# Patient Record
Sex: FEMALE | Race: WHITE | Employment: OTHER | URBAN - METROPOLITAN AREA
[De-identification: names, ages, dates, MRNs, and addresses within clinical notes are randomized per-mention and may not be internally consistent; named-entity substitution may affect disease eponyms.]

---

## 2019-04-04 ENCOUNTER — OFFICE VISIT (OUTPATIENT)
Dept: NEUROLOGY | Age: 69
End: 2019-04-04

## 2019-04-04 VITALS
DIASTOLIC BLOOD PRESSURE: 98 MMHG | HEIGHT: 63 IN | HEART RATE: 96 BPM | BODY MASS INDEX: 20.91 KG/M2 | OXYGEN SATURATION: 98 % | WEIGHT: 118 LBS | SYSTOLIC BLOOD PRESSURE: 145 MMHG

## 2019-04-04 DIAGNOSIS — I61.9 RIGHT-SIDED NONTRAUMATIC INTRACEREBRAL HEMORRHAGE, UNSPECIFIED CEREBRAL LOCATION (HCC): Primary | ICD-10-CM

## 2019-04-04 DIAGNOSIS — I10 ESSENTIAL HYPERTENSION: ICD-10-CM

## 2019-04-04 RX ORDER — FOLIC ACID 1 MG/1
1 TABLET ORAL DAILY
COMMUNITY

## 2019-04-04 RX ORDER — SERTRALINE HYDROCHLORIDE 50 MG/1
100 TABLET, FILM COATED ORAL DAILY
COMMUNITY

## 2019-04-04 RX ORDER — SIMVASTATIN 10 MG/1
10 TABLET, FILM COATED ORAL
COMMUNITY

## 2019-04-04 RX ORDER — METHOTREXATE 2.5 MG/1
2.5 TABLET ORAL
COMMUNITY

## 2019-04-04 RX ORDER — LISINOPRIL 20 MG/1
TABLET ORAL DAILY
COMMUNITY

## 2019-04-04 RX ORDER — TRAZODONE HYDROCHLORIDE 50 MG/1
50 TABLET ORAL
COMMUNITY

## 2019-04-04 RX ORDER — TRAMADOL HYDROCHLORIDE 50 MG/1
50 TABLET ORAL
COMMUNITY
End: 2019-06-06

## 2019-04-04 NOTE — LETTER
4/4/19 Patient: Elisa Robertson YOB: 1950 Date of Visit: 4/4/2019 Stanislav Bray MD 
[address] [facility] 
[address] [address] VIA Facsimile: [phone] Dear Stanislav Bray MD, Thank you for referring  Malini Hill to [address] [place] [address] for evaluation. My notes for this consultation are attached. If you have questions, please do not hesitate to call me. I look forward to following your patient along with you. Sincerely, Lory Dey MD

## 2019-04-04 NOTE — PROGRESS NOTES
Neurology Note    Chief Complaint   Patient presents with    New Patient     stroke symptoms left hand, shoulder and foot numbeness       HPI/Subjective  Raquel William is a 78 y.o. adult who presented to the neurology office for management of stroke. Patient states that she had intracranial hemorrhage on 16 January 2019. She was vacationing in [place] and stood up and started walking and all of a sudden she slipped and fell down. She immediately noticed that her left side of the body was numb and weak. There was also dysarthria. She was taken to the hospital where CT scan of the head was done which did show intracranial hemorrhage. Unfortunately I do not have the report from there. Patient did undergo inpatient rehab and is now doing physical therapy. Patient states that the weakness has gotten better and the numbness has gotten better as well. She still does not have the full use of her left arm. She limps a bit while walking. When she is tired she gets mild dysarthria. Current Outpatient Medications   Medication Sig    folic acid (FOLVITE) 1 mg tablet Take  by mouth daily.  lisinopril (PRINIVIL, ZESTRIL) 20 mg tablet Take  by mouth daily.  methotrexate (RHEUMATREX) 2.5 mg tablet Take 2.5 mg by mouth. Taking 6 tabs once every Tuesday    sertraline (ZOLOFT) 50 mg tablet Take 50 mg by mouth daily. Patient taking 3 tabs for depression    simvastatin (ZOCOR) 10 mg tablet Take  by mouth nightly.  traMADol (ULTRAM) 50 mg tablet Take 50 mg by mouth every six (6) hours as needed for Pain.  traZODone (DESYREL) 50 mg tablet Take  by mouth nightly. No current facility-administered medications for this visit.       Allergies no known allergies  Past Medical History:   Diagnosis Date    Arthritis     Cerebral artery occlusion with cerebral infarction [facility])     Essential hypertension      Past Surgical History:   Procedure Laterality Date    HX GYN      HX ORTHOPAEDIC       No family history on file. Social History     Tobacco Use    Smoking status: Not on file   Substance Use Topics    Alcohol use: Yes     Alcohol/week: 0.6 oz     Types: 1 Cans of beer per week    Drug use: Not on file       REVIEW OF SYSTEMS:   A ten system review of constitutional, cardiovascular, respiratory, musculoskeletal, endocrine, skin, SHEENT, genitourinary, psychiatric and neurologic systems was obtained and is unremarkable with the exception of anxiety, hearing loss and muscle weakness    EXAMINATION:   Visit Vitals  BP (!) 145/98   Pulse 96   Ht 5' 3\" (1.6 m)   Wt 118 lb (53.5 kg)   SpO2 98%   BMI 20.90 kg/m²        General:   General appearance: Pt is in no acute distress   Distal pulses are preserved  Fundoscopic Exam: Attempted    Neurological Examination:   Mental Status: AAO x3. Speech is fluent. Follows commands, has normal fund of knowledge, attention, short term recall, comprehension and insight. Cranial Nerves: Visual fields are full. PERRL, Extraocular movements are full. Facial sensation intact. Facial movement intact. Hearing intact to conversation. Palate elevates symmetrically. Shoulder shrug symmetric. Tongue midline. Motor: Strength is 5/5 except left upper extremity is 4 out of 5    Tone: Mild spasticity in left upper and lower extremity    Sensation: Decreased left upper and lower extremity numbness    Reflexes: DTRs 2+ throughout. Coordination/Cerebellar: Intact to finger-nose-finger     Gait: While walking, patient is not flexing her left leg as much in comparison to the right. Skin: No significant bruising or lacerations. Laboratory review:   No results found for this or any previous visit. Imaging review:  None    Documentation review:  None    Assessment/Plan:   Meghan Eisenberg is a 78 y.o. adult who presented to the neurology office for management of intracranial hemorrhage.   Patient had intracranial hemorrhage in January 2019 which left her with left-sided weakness, numbness and dysarthria. Symptoms are significantly better. Patient is continue with physical therapy and Occupational Therapy. Patient did also have hypertension and blood pressure today is 145/98. Patient's lisinopril was increased to 20 mg daily and she took her first dose today. We will continue to observe how her blood pressures. Blood pressure goal is less than 140/100. I am going to get records from her admission for stroke from [place]. Follow-up in 2 months. No flowsheet data found. Primary care to address possible depression if PHQ-9 score is more than 9. ICD-10-CM ICD-9-CM    1. Right-sided nontraumatic intracerebral hemorrhage, unspecified cerebral location (HCC) I61.9 431    2. Essential hypertension I10 401.9       Thank you for allowing me to participate in the care of  Tash. Please feel free to contact me if you have any questions. Jannie Monreal MD  Neurologist    CC: Faith Gill MD  Fax: [phone]    This note was created using voice recognition software. Despite editing, there may be syntax errors.

## 2019-06-06 ENCOUNTER — OFFICE VISIT (OUTPATIENT)
Dept: CARDIOLOGY CLINIC | Age: 69
End: 2019-06-06

## 2019-06-06 VITALS
HEART RATE: 91 BPM | HEIGHT: 63 IN | OXYGEN SATURATION: 96 % | SYSTOLIC BLOOD PRESSURE: 128 MMHG | BODY MASS INDEX: 21 KG/M2 | RESPIRATION RATE: 16 BRPM | WEIGHT: 118.5 LBS | DIASTOLIC BLOOD PRESSURE: 80 MMHG

## 2019-06-06 DIAGNOSIS — M06.9 RHEUMATOID ARTHRITIS, INVOLVING UNSPECIFIED SITE, UNSPECIFIED RHEUMATOID FACTOR PRESENCE: ICD-10-CM

## 2019-06-06 DIAGNOSIS — I10 ESSENTIAL HYPERTENSION: Primary | ICD-10-CM

## 2019-06-06 DIAGNOSIS — E78.2 MIXED HYPERLIPIDEMIA: ICD-10-CM

## 2019-06-06 DIAGNOSIS — Z86.73 HISTORY OF CVA (CEREBROVASCULAR ACCIDENT): ICD-10-CM

## 2019-06-06 RX ORDER — INFLIXIMAB 100 MG/10ML
INJECTION, POWDER, LYOPHILIZED, FOR SOLUTION INTRAVENOUS
COMMUNITY

## 2019-06-06 RX ORDER — AMLODIPINE BESYLATE 2.5 MG/1
TABLET ORAL
COMMUNITY
Start: 2019-05-22 | End: 2020-07-24

## 2019-06-06 RX ORDER — CLOBETASOL PROPIONATE 0.5 MG/G
CREAM TOPICAL
COMMUNITY
End: 2019-07-11

## 2019-06-06 NOTE — PROGRESS NOTES
1. Have you been to the ER, urgent care clinic since your last visit? Hospitalized since your last visit? Yes When: on 1/2019 for stroke (hospital in [place])    2. Have you seen or consulted any other health care providers outside of the [address] [place] [address] since your last visit? Include any pap smears or colon screening.  No    Chief Complaint   Patient presents with   [facility] [place]     new pt; referred by rheumatolgist Dr. Vishal Marley Stroke     hx of stroke on 1/2019

## 2019-06-06 NOTE — PROGRESS NOTES
[address] [place] [address]  [phone]     Subjective:      Maxime Bowman is a 78 y.o. female with pmhx HTN HLD CVA in 1/19 while in [place] is here to establish care. Reports numbness on left arm and left leg and some mild facial numbness left side. Has some mild monge. The patient denies chest pain,  orthopnea, PND, LE edema, palpitations, syncope, or presyncope. There are no active problems to display for this patient. Faith Gill MD  Past Medical History:   Diagnosis Date    Arthritis     Cerebral artery occlusion with cerebral infarction [facility])     Essential hypertension       Past Surgical History:   Procedure Laterality Date    HX GYN      HX ORTHOPAEDIC       No Known Allergies   History reviewed. No pertinent family history. Social History     Socioeconomic History    Marital status:      Spouse name: Not on file    Number of children: Not on file    Years of education: Not on file    Highest education level: Not on file   Occupational History    Not on file   Social Needs    Financial resource strain: Not on file    Food insecurity:     Worry: Not on file     Inability: Not on file    Transportation needs:     Medical: Not on file     Non-medical: Not on file   Tobacco Use    Smoking status: Former Smoker    Smokeless tobacco: Former User   Substance and Sexual Activity    Alcohol use:  Yes     Alcohol/week: 0.6 oz     Types: 1 Cans of beer per week     Comment: occasionally    Drug use: Not on file    Sexual activity: Not on file   Lifestyle    Physical activity:     Days per week: Not on file     Minutes per session: Not on file    Stress: Not on file   Relationships    Social connections:     Talks on phone: Not on file     Gets together: Not on file     Attends Baptism service: Not on file     Active member of club or organization: Not on file     Attends meetings of clubs or organizations: Not on file Relationship status: Not on file    Intimate partner violence:     Fear of current or ex partner: Not on file     Emotionally abused: Not on file     Physically abused: Not on file     Forced sexual activity: Not on file   Other Topics Concern    Not on file   Social History Narrative    Not on file      Current Outpatient Medications   Medication Sig    inFLIXimab (REMICADE) 100 mg injection by IntraVENous route every two (2) months.  amLODIPine (NORVASC) 2.5 mg tablet TAKE 1 TABLET BY MOUTH ONCE A DAY FOR BLOOD PRESSURE    clobetasol (TEMOVATE) 0.05 % topical cream clobetasol 0.05 % topical cream    folic acid (FOLVITE) 1 mg tablet Take  by mouth daily.  lisinopril (PRINIVIL, ZESTRIL) 20 mg tablet Take  by mouth daily.  methotrexate (RHEUMATREX) 2.5 mg tablet Take 2.5 mg by mouth. Taking 6 tabs once every Tuesday    sertraline (ZOLOFT) 50 mg tablet Take 100 mg by mouth daily. Patient taking 3 tabs for depression     simvastatin (ZOCOR) 10 mg tablet Take  by mouth nightly.  traZODone (DESYREL) 50 mg tablet Take  by mouth nightly. No current facility-administered medications for this visit. Review of Symptoms:  11 systems reviewed, negative other than as stated in the HPI    Physical ExamPhysical Exam:    Vitals:    06/06/19 1327   BP: 128/80   Pulse: 91   Resp: 16   SpO2: 96%   Weight: 118 lb 8 oz (53.8 kg)   Height: 5' 3\" (1.6 m)     Body mass index is 20.99 kg/m². General PE   Gen:  NAD  Mental Status - Alert. General Appearance - Not in acute distress. Chest and Lung Exam   Inspection: Accessory muscles - No use of accessory muscles in breathing. Auscultation:   Breath sounds: - Normal.   Cardiovascular   Inspection: Jugular vein - Bilateral - Inspection Normal.   Palpation/Percussion:   Apical Impulse: - Normal.   Auscultation: Rhythm - Regular. Heart Sounds - S1 WNL and S2 WNL. No S3 or S4. Murmurs & Other Heart Sounds: Auscultation of the heart reveals - No Murmurs. Peripheral Vascular   Upper Extremity: Inspection - Bilateral - No Cyanotic nailbeds or Digital clubbing. Lower Extremity:   Palpation: Edema - Bilateral - No edema. Abdomen:   Soft, non-tender, bowel sounds are active. Neuro: A&O times 3, left sided weakness. Right hand is weak d/t hairline fx on her wrist    Labs:   No results found for: CHOL, CHOLX, CHLST, CHOLV, [id number], HDL, LDL, LDLC, DLDLP, TGLX, TRIGL, TRIGP, CHHD, CHHDX  No results found for: CPK, CPKX, CPX  No results found for: NA, K, CL, CO2, AGAP, GLU, BUN, CREA, BUCR, GFRAA, GFRNA, CA, TBIL, TBILI, GPT, SGOT, AP, TP, ALB, GLOB, AGRAT, ALT    EKG:  SR     Assessment:     Assessment:      1. Essential hypertension    2. Mixed hyperlipidemia    3. History of CVA (cerebrovascular accident)    4. Rheumatoid arthritis, involving unspecified site, unspecified rheumatoid factor presence ([facility] [address]        Orders Placed This Encounter    AMB POC EKG ROUTINE W/ 12 LEADS, INTER & REP     Order Specific Question:   Reason for Exam:     Answer:   Routine    inFLIXimab (REMICADE) 100 mg injection     Sig: by IntraVENous route every two (2) months.  amLODIPine (NORVASC) 2.5 mg tablet     Sig: TAKE 1 TABLET BY MOUTH ONCE A DAY FOR BLOOD PRESSURE    clobetasol (TEMOVATE) 0.05 % topical cream     Sig: clobetasol 0.05 % topical cream        Plan: This  is a 78 y.o. female with pmhx HTN HLD CVA in 1/19 while in [place] is here to establish care. Reports numbness on left arm and left leg and some mild facial numbness left side. Has some mild monge    Cardiac risk factors: HTN HLD postmenopausal female age former smoker mother hx of CVA, unknown if CAD    HTN  Controlled with current therapy    HLD  10/18 . Recently started on statin. Lipids and labs followed by PCP.       Recent CVA intracranial hemorrhage  1/19 with residual left sided weakness  Per pt,  She was vacationing in [place] and stood up and started walking and all of a sudden she slipped and fell down.  She immediately noticed that her left side of the body was numb and weak  Followed by DR Reji Davila  Working with PT    RA  On Mtx, remicade  Followed by Dr Gomez Pacer    Will obtain  echo and Mary Cotto to further evaluate. Continue current care and f/u when testing complete    Sindy Spain NP       [facility]    6/6/2019         Patient seen, examined by me personally. Plan discussed as detailed. Agree with note as outlined by  NP. I confirm findings in history and physical exam. No additional findings noted. Agree with plan as outlined above. Patient  is still recovering from her CVA. Has left arm/facial pain numbness. Will evaluate as noted.      Rebekah Paredes MD

## 2019-06-19 ENCOUNTER — HOSPITAL ENCOUNTER (EMERGENCY)
Age: 69
Discharge: HOME | End: 2019-06-19
Attending: EMERGENCY MEDICINE
Payer: MEDICARE

## 2019-06-19 VITALS
RESPIRATION RATE: 17 BRPM | HEART RATE: 85 BPM | WEIGHT: 121.25 LBS | TEMPERATURE: 98.1 F | OXYGEN SATURATION: 94 % | DIASTOLIC BLOOD PRESSURE: 81 MMHG | SYSTOLIC BLOOD PRESSURE: 156 MMHG | BODY MASS INDEX: 21.48 KG/M2 | HEIGHT: 63 IN

## 2019-06-19 DIAGNOSIS — F41.1 ANXIETY STATE: ICD-10-CM

## 2019-06-19 DIAGNOSIS — I10 ACCELERATED HYPERTENSION: Primary | ICD-10-CM

## 2019-06-19 LAB
ALBUMIN SERPL-MCNC: 4.5 G/DL (ref 3.5–5)
ALBUMIN/GLOB SERPL: 1.2 {RATIO} (ref 1.1–2.2)
ALP SERPL-CCNC: 111 U/L (ref 45–117)
ALT SERPL-CCNC: 28 U/L (ref 12–78)
ANION GAP SERPL CALC-SCNC: 5 MMOL/L (ref 5–15)
AST SERPL-CCNC: 18 U/L (ref 15–37)
BASOPHILS # BLD: 0.1 K/UL (ref 0–0.1)
BASOPHILS NFR BLD: 1 % (ref 0–1)
BILIRUB SERPL-MCNC: 0.4 MG/DL (ref 0.2–1)
BUN SERPL-MCNC: 14 MG/DL (ref 6–20)
BUN/CREAT SERPL: 15 (ref 12–20)
CALCIUM SERPL-MCNC: 9.3 MG/DL (ref 8.5–10.1)
CHLORIDE SERPL-SCNC: 106 MMOL/L (ref 97–108)
CO2 SERPL-SCNC: 29 MMOL/L (ref 21–32)
CREAT SERPL-MCNC: 0.93 MG/DL (ref 0.55–1.02)
DIFFERENTIAL METHOD BLD: ABNORMAL
EOSINOPHIL # BLD: 0.2 K/UL (ref 0–0.4)
EOSINOPHIL NFR BLD: 3 % (ref 0–7)
ERYTHROCYTE [DISTWIDTH] IN BLOOD BY AUTOMATED COUNT: 13.2 % (ref 11.5–14.5)
GLOBULIN SER CALC-MCNC: 3.9 G/DL (ref 2–4)
GLUCOSE SERPL-MCNC: 89 MG/DL (ref 65–100)
HCT VFR BLD AUTO: 47.2 % (ref 35–47)
HGB BLD-MCNC: 15.5 G/DL (ref 11.5–16)
IMM GRANULOCYTES # BLD AUTO: 0 K/UL (ref 0–0.04)
IMM GRANULOCYTES NFR BLD AUTO: 0 % (ref 0–0.5)
LYMPHOCYTES # BLD: 2.3 K/UL (ref 0.8–3.5)
LYMPHOCYTES NFR BLD: 35 % (ref 12–49)
MCH RBC QN AUTO: 31.4 PG (ref 26–34)
MCHC RBC AUTO-ENTMCNC: 32.8 G/DL (ref 30–36.5)
MCV RBC AUTO: 95.5 FL (ref 80–99)
MONOCYTES # BLD: 0.5 K/UL (ref 0–1)
MONOCYTES NFR BLD: 8 % (ref 5–13)
NEUTS SEG # BLD: 3.4 K/UL (ref 1.8–8)
NEUTS SEG NFR BLD: 53 % (ref 32–75)
NRBC # BLD: 0 K/UL (ref 0–0.01)
NRBC BLD-RTO: 0 PER 100 WBC
PLATELET # BLD AUTO: 282 K/UL (ref 150–400)
PMV BLD AUTO: 8.9 FL (ref 8.9–12.9)
POTASSIUM SERPL-SCNC: 3.9 MMOL/L (ref 3.5–5.1)
PROT SERPL-MCNC: 8.4 G/DL (ref 6.4–8.2)
RBC # BLD AUTO: 4.94 M/UL (ref 3.8–5.2)
SODIUM SERPL-SCNC: 140 MMOL/L (ref 136–145)
WBC # BLD AUTO: 6.4 K/UL (ref 3.6–11)

## 2019-06-19 PROCEDURE — 80053 COMPREHEN METABOLIC PANEL: CPT

## 2019-06-19 PROCEDURE — 99284 EMERGENCY DEPT VISIT MOD MDM: CPT

## 2019-06-19 PROCEDURE — 85025 COMPLETE CBC W/AUTO DIFF WBC: CPT

## 2019-06-19 PROCEDURE — 36415 COLL VENOUS BLD VENIPUNCTURE: CPT

## 2019-06-19 RX ORDER — HYDROXYZINE 25 MG/1
25 TABLET, FILM COATED ORAL
Qty: 20 TAB | Refills: 0 | Status: SHIPPED | OUTPATIENT
Start: 2019-06-19 | End: 2019-06-29

## 2019-06-19 RX ORDER — LABETALOL HYDROCHLORIDE 5 MG/ML
5 INJECTION, SOLUTION INTRAVENOUS
Status: DISCONTINUED | OUTPATIENT
Start: 2019-06-19 | End: 2019-06-19

## 2019-06-19 RX ORDER — LABETALOL HYDROCHLORIDE 5 MG/ML
10 INJECTION, SOLUTION INTRAVENOUS
Status: DISCONTINUED | OUTPATIENT
Start: 2019-06-19 | End: 2019-06-19

## 2019-06-20 NOTE — ED NOTES
Tay Rivera MD at bedside at this time to evaluate pt.     Verbal orders from Tay Rivera to hold labetalol

## 2019-06-20 NOTE — ED PROVIDER NOTES
EMERGENCY DEPARTMENT HISTORY AND PHYSICAL EXAM      Date: 6/19/2019  Patient Name: Hannah Riggs    History of Presenting Illness     Chief Complaint   Patient presents with    Hypertension       History Provided By: Patient    HPI: Hannah Riggs, 78 y.o. female with PMHx significant for CVA with residual and hypertension, presents    to the ED with cc of elevated blood pressure. The patient states that she was being seen by her physical therapist today when her blood pressure was elevated. She cannot remember the numbers, but states the systolic was in the 111R. The patient states that she has been taking her blood pressure medicine appropriately. She cannot remember the names of all her. She denies headache, chest pain, dizziness, new numbness or tingling. There are no other complaints, changes, or physical findings at this time. PCP: Naz Ogden MD    No current facility-administered medications on file prior to encounter. Current Outpatient Medications on File Prior to Encounter   Medication Sig Dispense Refill    inFLIXimab (REMICADE) 100 mg injection by IntraVENous route every two (2) months.  amLODIPine (NORVASC) 2.5 mg tablet TAKE 1 TABLET BY MOUTH ONCE A DAY FOR BLOOD PRESSURE      clobetasol (TEMOVATE) 0.05 % topical cream clobetasol 0.05 % topical cream      folic acid (FOLVITE) 1 mg tablet Take  by mouth daily.  lisinopril (PRINIVIL, ZESTRIL) 20 mg tablet Take  by mouth daily.  methotrexate (RHEUMATREX) 2.5 mg tablet Take 2.5 mg by mouth. Taking 6 tabs once every Tuesday      sertraline (ZOLOFT) 50 mg tablet Take 100 mg by mouth daily. Patient taking 3 tabs for depression       simvastatin (ZOCOR) 10 mg tablet Take  by mouth nightly.  traZODone (DESYREL) 50 mg tablet Take  by mouth nightly.          Past History     Past Medical History:  Past Medical History:   Diagnosis Date    Arthritis     Cerebral artery occlusion with cerebral infarction ([facility] [address]     Essential hypertension        Past Surgical History:  Past Surgical History:   Procedure Laterality Date    HX GYN      HX ORTHOPAEDIC         Family History:  History reviewed. No pertinent family history. Social History:  Social History     Tobacco Use    Smoking status: Former Smoker    Smokeless tobacco: Former User   Substance Use Topics    Alcohol use: Yes     Alcohol/week: 0.6 oz     Types: 1 Cans of beer per week     Comment: occasionally    Drug use: Not on file       Allergies:  No Known Allergies      Review of Systems   Review of Systems   HENT: Negative for congestion. Eyes: Negative. Cardiovascular: Negative for chest pain. Gastrointestinal: Negative for abdominal pain. Endocrine: Negative for heat intolerance. Genitourinary: Negative. Musculoskeletal: Negative for back pain. Skin: Negative for rash. Allergic/Immunologic: Negative for immunocompromised state. Neurological: Positive for numbness. Negative for dizziness. The patient has baseline numbness on the right side. Hematological: Does not bruise/bleed easily. Psychiatric/Behavioral: Negative. All other systems reviewed and are negative. Physical Exam   Physical Exam   Constitutional: She is oriented to person, place, and time. She appears well-developed and well-nourished. No distress. HENT:   Head: Normocephalic. Eyes: Pupils are equal, round, and reactive to light. EOM are normal.   Neck: Normal range of motion. Neck supple. Cardiovascular: Normal rate, regular rhythm, normal heart sounds and intact distal pulses. Pulmonary/Chest: Effort normal and breath sounds normal. No respiratory distress. Abdominal: Soft. Bowel sounds are normal. There is no tenderness. Musculoskeletal: Normal range of motion. She exhibits no edema. Neurological: She is alert and oriented to person, place, and time.    Decreased light touch sensation right arm right leg, baseline per patient   Skin: Skin is warm and dry. Psychiatric: She has a normal mood and affect. Her behavior is normal.   Nursing note and vitals reviewed. Diagnostic Study Results     Labs -     Recent Results (from the past 12 hour(s))   CBC WITH AUTOMATED DIFF    Collection Time: 06/19/19  5:56 PM   Result Value Ref Range    WBC 6.4 3.6 - 11.0 K/uL    RBC 4.94 3.80 - 5.20 M/uL    HGB 15.5 11.5 - 16.0 g/dL    HCT 47.2 (H) 35.0 - 47.0 %    MCV 95.5 80.0 - 99.0 FL    MCH 31.4 26.0 - 34.0 PG    MCHC 32.8 30.0 - 36.5 g/dL    RDW 13.2 11.5 - 14.5 %    PLATELET 268 465 - 329 K/uL    MPV 8.9 8.9 - 12.9 FL    NRBC 0.0 0  WBC    ABSOLUTE NRBC 0.00 0.00 - 0.01 K/uL    NEUTROPHILS 53 32 - 75 %    LYMPHOCYTES 35 12 - 49 %    MONOCYTES 8 5 - 13 %    EOSINOPHILS 3 0 - 7 %    BASOPHILS 1 0 - 1 %    IMMATURE GRANULOCYTES 0 0.0 - 0.5 %    ABS. NEUTROPHILS 3.4 1.8 - 8.0 K/UL    ABS. LYMPHOCYTES 2.3 0.8 - 3.5 K/UL    ABS. MONOCYTES 0.5 0.0 - 1.0 K/UL    ABS. EOSINOPHILS 0.2 0.0 - 0.4 K/UL    ABS. BASOPHILS 0.1 0.0 - 0.1 K/UL    ABS. IMM. GRANS. 0.0 0.00 - 0.04 K/UL    DF AUTOMATED     METABOLIC PANEL, COMPREHENSIVE    Collection Time: 06/19/19  5:56 PM   Result Value Ref Range    Sodium 140 136 - 145 mmol/L    Potassium 3.9 3.5 - 5.1 mmol/L    Chloride 106 97 - 108 mmol/L    CO2 29 21 - 32 mmol/L    Anion gap 5 5 - 15 mmol/L    Glucose 89 65 - 100 mg/dL    BUN 14 6 - 20 MG/DL    Creatinine 0.93 0.55 - 1.02 MG/DL    BUN/Creatinine ratio 15 12 - 20      GFR est AA >60 >60 ml/min/1.73m2    GFR est non-AA 60 (L) >60 ml/min/1.73m2    Calcium 9.3 8.5 - 10.1 MG/DL    Bilirubin, total 0.4 0.2 - 1.0 MG/DL    ALT (SGPT) 28 12 - 78 U/L    AST (SGOT) 18 15 - 37 U/L    Alk.  phosphatase 111 45 - 117 U/L    Protein, total 8.4 (H) 6.4 - 8.2 g/dL    Albumin 4.5 3.5 - 5.0 g/dL    Globulin 3.9 2.0 - 4.0 g/dL    A-G Ratio 1.2 1.1 - 2.2         Radiologic Studies -   No orders to display     CT Results  (Last 48 hours)    None        CXR Results  (Last 48 hours)    None Medical Decision Making   I am the first provider for this patient. I reviewed the vital signs, available nursing notes, past medical history, past surgical history, family history and social history. Vital Signs-Reviewed the patient's vital signs. Patient Vitals for the past 12 hrs:   Temp Pulse Resp BP SpO2   06/19/19 2219    166/84 96 %   06/19/19 2154  79 18 (!) 200/99 92 %   06/19/19 2145    (!) 216/118 96 %   06/19/19 2100  69 18 (!) 179/106 95 %   06/19/19 2045    (!) 173/133 95 %   06/19/19 2038  71 19 (!) 180/117 95 %   06/19/19 2001    (!) 190/101    06/19/19 1936 98.2 °F (36.8 °C) 74 18 (!) 222/155 96 %   06/19/19 1614 98 °F (36.7 °C) 79 16 (!) 214/103 98 %       Pulse Oximetry Analysis - 98% on room air    Cardiac Monitor:   Rate: 79 bpm  Rhythm: Normal Sinus Rhythm          Records Reviewed: Nursing Notes, Old Medical Records, Previous electrocardiograms, Previous Radiology Studies and Previous Laboratory Studies    Provider Notes (Medical Decision Making): Hypertension, anxiety, renal insufficiency, electrolyte abnormality    ED Course:   Initial assessment performed. The patients presenting problems have been discussed, and they are in agreement with the care plan formulated and outlined with them. I have encouraged them to ask questions as they arise throughout their visit. Progress note: The patient's nurse had the patiently very still. When she rechecked her blood pressure, it was suddenly 166/84. This was without any medications. The patient admits that she is very anxious and stressed at this time. So I will give her a prescription for Atarax, and have her follow-up with her PCP next week for blood pressure recheck. Critical Care Time:   0    Disposition:  home    PLAN:  1.    Discharge Medication List as of 6/19/2019 10:37 PM      START taking these medications    Details   hydrOXYzine HCl (ATARAX) 25 mg tablet Take 1 Tab by mouth every six (6) hours as needed for Itching for up to 10 days. , Normal, Disp-20 Tab, R-0         CONTINUE these medications which have NOT CHANGED    Details   inFLIXimab (REMICADE) 100 mg injection by IntraVENous route every two (2) months., Historical Med      amLODIPine (NORVASC) 2.5 mg tablet TAKE 1 TABLET BY MOUTH ONCE A DAY FOR BLOOD PRESSURE, Historical Med      clobetasol (TEMOVATE) 0.05 % topical cream clobetasol 0.05 % topical cream, Historical Med      folic acid (FOLVITE) 1 mg tablet Take  by mouth daily. , Historical Med      lisinopril (PRINIVIL, ZESTRIL) 20 mg tablet Take  by mouth daily. , Historical Med      methotrexate (RHEUMATREX) 2.5 mg tablet Take 2.5 mg by mouth. Taking 6 tabs once every Tuesday, Historical Med      sertraline (ZOLOFT) 50 mg tablet Take 100 mg by mouth daily. Patient taking 3 tabs for depression , Historical Med      simvastatin (ZOCOR) 10 mg tablet Take  by mouth nightly., Historical Med      traZODone (DESYREL) 50 mg tablet Take  by mouth nightly., Historical Med           2. Follow-up Information     Follow up With Specialties Details Why Contact Info    Chantelle Arceo MD Family Practice In 1 week For a blood pressure check [address]  [address] [address] [phone]      [facility] EMERGENCY DEPT Emergency Medicine  If symptoms worsen [address] [place] [address] Drive  [address] [facility]  [phone]        Return to ED if worse     Diagnosis     Clinical Impression:   1. Accelerated hypertension    2. Anxiety state        Attestations:     This chart was completed by myself, Dr. Cheyenne Griffin

## 2019-06-20 NOTE — DISCHARGE INSTRUCTIONS
Patient Education        Anxiety Disorder: Care Instructions  Your Care Instructions    Anxiety is a normal reaction to stress. Difficult situations can cause you to have symptoms such as sweaty palms and a nervous feeling. In an anxiety disorder, the symptoms are far more severe. Constant worry, muscle tension, trouble sleeping, nausea and diarrhea, and other symptoms can make normal daily activities difficult or impossible. These symptoms may occur for no reason, and they can affect your work, school, or social life. Medicines, counseling, and self-care can all help. Follow-up care is a key part of your treatment and safety. Be sure to make and go to all appointments, and call your doctor if you are having problems. It's also a good idea to know your test results and keep a list of the medicines you take. How can you care for yourself at home? · Take medicines exactly as directed. Call your doctor if you think you are having a problem with your medicine. · Go to your counseling sessions and follow-up appointments. · Recognize and accept your anxiety. Then, when you are in a situation that makes you anxious, say to yourself, \"This is not an emergency. I feel uncomfortable, but I am not in danger. I can keep going even if I feel anxious. \"  · Be kind to your body:  ? Relieve tension with exercise or a massage. ? Get enough rest.  ? Avoid alcohol, caffeine, nicotine, and illegal drugs. They can increase your anxiety level and cause sleep problems. ? Learn and do relaxation techniques. See below for more about these techniques. · Engage your mind. Get out and do something you enjoy. Go to a funny movie, or take a walk or hike. Plan your day. Having too much or too little to do can make you anxious. · Keep a record of your symptoms. Discuss your fears with a good friend or family member, or join a support group for people with similar problems. Talking to others sometimes relieves stress.   · Get involved in social groups, or volunteer to help others. Being alone sometimes makes things seem worse than they are. · Get at least 30 minutes of exercise on most days of the week to relieve stress. Walking is a good choice. You also may want to do other activities, such as running, swimming, cycling, or playing tennis or team sports. Relaxation techniques  Do relaxation exercises 10 to 20 minutes a day. You can play soothing, relaxing music while you do them, if you wish. · Tell others in your house that you are going to do your relaxation exercises. Ask them not to disturb you. · Find a comfortable place, away from all distractions and noise. · Lie down on your back, or sit with your back straight. · Focus on your breathing. Make it slow and steady. · Breathe in through your nose. Breathe out through either your nose or mouth. · Breathe deeply, filling up the area between your navel and your rib cage. Breathe so that your belly goes up and down. · Do not hold your breath. · Breathe like this for 5 to 10 minutes. Notice the feeling of calmness throughout your whole body. As you continue to breathe slowly and deeply, relax by doing the following for another 5 to 10 minutes:  · Tighten and relax each muscle group in your body. You can begin at your toes and work your way up to your head. · Imagine your muscle groups relaxing and becoming heavy. · Empty your mind of all thoughts. · Let yourself relax more and more deeply. · Become aware of the state of calmness that surrounds you. · When your relaxation time is over, you can bring yourself back to alertness by moving your fingers and toes and then your hands and feet and then stretching and moving your entire body. Sometimes people fall asleep during relaxation, but they usually wake up shortly afterward. · Always give yourself time to return to full alertness before you drive a car or do anything that might cause an accident if you are not fully alert.  Never play a relaxation tape while you drive a car. When should you call for help? Call 911 anytime you think you may need emergency care. For example, call if:    · You feel you cannot stop from hurting yourself or someone else.   Karlie Fought the numbers for these national suicide hotlines: 2-241-199-TALK (4-836.836.8240) and 8-519-PNLKMQC (1-754.542.6885). If you or someone you know talks about suicide or feeling hopeless, get help right away.   Watch closely for changes in your health, and be sure to contact your doctor if:    · You have anxiety or fear that affects your life.     · You have symptoms of anxiety that are new or different from those you had before. Where can you learn more? Go to http://cecelia-yolanda.info/. Enter P754 in the search box to learn more about \"Anxiety Disorder: Care Instructions. \"  Current as of: September 11, 2018  Content Version: 11.9  © 4851-4903 [employer]. Care instructions adapted under license by [employer] (which disclaims liability or warranty for this information). If you have questions about a medical condition or this instruction, always ask your healthcare professional. [place] [address] any warranty or liability for your use of this information. Patient Education        High Blood Pressure: Care Instructions  Overview    It's normal for blood pressure to go up and down throughout the day. But if it stays up, you have high blood pressure. Another name for high blood pressure is hypertension. Despite what a lot of people think, high blood pressure usually doesn't cause headaches or make you feel dizzy or lightheaded. It usually has no symptoms. But it does increase your risk of stroke, heart attack, and other problems. You and your doctor will talk about your risks of these problems based on your blood pressure. Your doctor will give you a goal for your blood pressure.  Your goal will be based on your health and your age. Lifestyle changes, such as eating healthy and being active, are always important to help lower blood pressure. You might also take medicine to reach your blood pressure goal.  Follow-up care is a key part of your treatment and safety. Be sure to make and go to all appointments, and call your doctor if you are having problems. It's also a good idea to know your test results and keep a list of the medicines you take. How can you care for yourself at home? Medical treatment  · If you stop taking your medicine, your blood pressure will go back up. You may take one or more types of medicine to lower your blood pressure. Be safe with medicines. Take your medicine exactly as prescribed. Call your doctor if you think you are having a problem with your medicine. · Talk to your doctor before you start taking aspirin every day. Aspirin can help certain people lower their risk of a heart attack or stroke. But taking aspirin isn't right for everyone, because it can cause serious bleeding. · See your doctor regularly. You may need to see the doctor more often at first or until your blood pressure comes down. · If you are taking blood pressure medicine, talk to your doctor before you take decongestants or anti-inflammatory medicine, such as ibuprofen. Some of these medicines can raise blood pressure. · Learn how to check your blood pressure at home. Lifestyle changes  · Stay at a healthy weight. This is especially important if you put on weight around the waist. Losing even 10 pounds can help you lower your blood pressure. · If your doctor recommends it, get more exercise. Walking is a good choice. Bit by bit, increase the amount you walk every day. Try for at least 30 minutes on most days of the week. You also may want to swim, bike, or do other activities. · Avoid or limit alcohol. Talk to your doctor about whether you can drink any alcohol.   · Try to limit how much sodium you eat to less than 2,300 milligrams (mg) a day. Your doctor may ask you to try to eat less than 1,500 mg a day. · Eat plenty of fruits (such as bananas and oranges), vegetables, legumes, whole grains, and low-fat dairy products. · Lower the amount of saturated fat in your diet. Saturated fat is found in animal products such as milk, cheese, and meat. Limiting these foods may help you lose weight and also lower your risk for heart disease. · Do not smoke. Smoking increases your risk for heart attack and stroke. If you need help quitting, talk to your doctor about stop-smoking programs and medicines. These can increase your chances of quitting for good. When should you call for help? Call 911 anytime you think you may need emergency care. This may mean having symptoms that suggest that your blood pressure is causing a serious heart or blood vessel problem. Your blood pressure may be over 180/120.   For example, call 911 if:    · You have symptoms of a heart attack. These may include:  ? Chest pain or pressure, or a strange feeling in the chest.  ? Sweating. ? Shortness of breath. ? Nausea or vomiting. ? Pain, pressure, or a strange feeling in the back, neck, jaw, or upper belly or in one or both shoulders or arms. ? Lightheadedness or sudden weakness. ? A fast or irregular heartbeat.     · You have symptoms of a stroke. These may include:  ? Sudden numbness, tingling, weakness, or loss of movement in your face, arm, or leg, especially on only one side of your body. ? Sudden vision changes. ? Sudden trouble speaking. ? Sudden confusion or trouble understanding simple statements. ? Sudden problems with walking or balance. ? A sudden, severe headache that is different from past headaches.     · You have severe back or belly pain.    Do not wait until your blood pressure comes down on its own.  Get help right away.   Call your doctor now or seek immediate care if:    · Your blood pressure is much higher than normal (such as 180/120 or higher), but you don't have symptoms.     · You think high blood pressure is causing symptoms, such as:  ? Severe headache.  ? Blurry vision.    Watch closely for changes in your health, and be sure to contact your doctor if:    · Your blood pressure measures higher than your doctor recommends at least 2 times. That means the top number is higher or the bottom number is higher, or both.     · You think you may be having side effects from your blood pressure medicine. Where can you learn more? Go to http://cecelia-yolanda.info/. Enter J151 in the search box to learn more about \"High Blood Pressure: Care Instructions. \"  Current as of: July 22, 2018  Content Version: 11.9  © 7317-2641 [employer]. Care instructions adapted under license by [employer] (which disclaims liability or warranty for this information). If you have questions about a medical condition or this instruction, always ask your healthcare professional. [address] any warranty or liability for your use of this information.

## 2019-06-20 NOTE — ED NOTES
Dr Angely Fregoso has reviewed discharge instructions with the patient. The patient verbalized understanding. Pt. A&Ox4, respirations even and unlabored. VS stable as noted in flowsheet. Pt Declined wheelchair assist from department; paperwork in hand.

## 2019-06-20 NOTE — ED NOTES
Pt arrives ambulatory to the ED with c/o HTN, pt denies headache or recent change in medications.     Pt placed x2 on monitor

## 2019-06-20 NOTE — ED NOTES
Pt sitting up in bed eating at this time. BP is slightly more elevated, Dr Vikas Talley aware. WCTM. Primary RN aware.

## 2019-06-27 ENCOUNTER — TELEPHONE (OUTPATIENT)
Dept: NEUROLOGY | Age: 69
End: 2019-06-27

## 2019-07-11 ENCOUNTER — OFFICE VISIT (OUTPATIENT)
Dept: CARDIOLOGY CLINIC | Age: 69
End: 2019-07-11

## 2019-07-11 VITALS
DIASTOLIC BLOOD PRESSURE: 74 MMHG | WEIGHT: 123 LBS | OXYGEN SATURATION: 98 % | HEART RATE: 69 BPM | RESPIRATION RATE: 16 BRPM | BODY MASS INDEX: 21.79 KG/M2 | HEIGHT: 63 IN | SYSTOLIC BLOOD PRESSURE: 132 MMHG

## 2019-07-11 DIAGNOSIS — M06.9 RHEUMATOID ARTHRITIS, INVOLVING UNSPECIFIED SITE, UNSPECIFIED RHEUMATOID FACTOR PRESENCE: ICD-10-CM

## 2019-07-11 DIAGNOSIS — Z86.73 HISTORY OF CVA (CEREBROVASCULAR ACCIDENT): ICD-10-CM

## 2019-07-11 DIAGNOSIS — I10 ESSENTIAL HYPERTENSION: Primary | ICD-10-CM

## 2019-07-11 DIAGNOSIS — E78.2 MIXED HYPERLIPIDEMIA: ICD-10-CM

## 2019-07-11 NOTE — PROGRESS NOTES
1. Have you been to the ER, urgent care clinic since your last visit? Hospitalized since your last visit? YES ER HIGH BP, MONTH AGO     2. Have you seen or consulted any other health care providers outside of the [facility] since your last visit? Include any pap smears or colon screening.  NO    1  MONTH FOLLOW UP. NO CARDIAC C/O,

## 2019-07-11 NOTE — PROGRESS NOTES
[address] [place] [address], [address] [facility]  [phone]     Subjective:      Imtiaz Morse is a 78 y.o. female  is a 79 YO F with pmhx HTN HLD CVA in 1/19 while in [place] is here for test follow up. Reports peristent  numbness on left arm and left leg and some mild facial numbness left side, working with physical/occupational therapy. Was in the ED 6/19/19 for elevated BP, SBP > 200. Labs normal, EKG was SR. Was ordered to receive Labetalol but BP improved after she relaxed and got over her anxiety. She is tearful today, states that she has been more emotional since she had the stroke. Has f/u with PCP tomorrow. The patient denies chest pain/ shortness of breath, orthopnea, PND, LE edema, palpitations, syncope, or presyncope. There are no active problems to display for this patient. Kennedi Cyr MD  Past Medical History:   Diagnosis Date    Arthritis     Cerebral artery occlusion with cerebral infarction [facility])     Essential hypertension       Past Surgical History:   Procedure Laterality Date    HX GYN      HX ORTHOPAEDIC       No Known Allergies   No family history on file. Social History     Socioeconomic History    Marital status:      Spouse name: Not on file    Number of children: Not on file    Years of education: Not on file    Highest education level: Not on file   Occupational History    Not on file   Social Needs    Financial resource strain: Not on file    Food insecurity:     Worry: Not on file     Inability: Not on file    Transportation needs:     Medical: Not on file     Non-medical: Not on file   Tobacco Use    Smoking status: Former Smoker    Smokeless tobacco: Former User   Substance and Sexual Activity    Alcohol use:  Yes     Alcohol/week: 0.6 oz     Types: 1 Cans of beer per week     Comment: occasionally    Drug use: Not on file    Sexual activity: Not on file   Lifestyle    Physical activity:     Days per week: Not on file     Minutes per session: Not on file    Stress: Not on file   Relationships    Social connections:     Talks on phone: Not on file     Gets together: Not on file     Attends Hinduism service: Not on file     Active member of club or organization: Not on file     Attends meetings of clubs or organizations: Not on file     Relationship status: Not on file    Intimate partner violence:     Fear of current or ex partner: Not on file     Emotionally abused: Not on file     Physically abused: Not on file     Forced sexual activity: Not on file   Other Topics Concern    Not on file   Social History Narrative    Not on file      Current Outpatient Medications   Medication Sig    inFLIXimab (REMICADE) 100 mg injection by IntraVENous route every two (2) months.  amLODIPine (NORVASC) 2.5 mg tablet TAKE 1 TABLET BY MOUTH ONCE A DAY FOR BLOOD PRESSURE    folic acid (FOLVITE) 1 mg tablet Take  by mouth daily.  lisinopril (PRINIVIL, ZESTRIL) 20 mg tablet Take  by mouth daily.  methotrexate (RHEUMATREX) 2.5 mg tablet Take 2.5 mg by mouth. Taking 6 tabs once every Tuesday    sertraline (ZOLOFT) 50 mg tablet Take 100 mg by mouth daily. Patient taking 3 tabs for depression     simvastatin (ZOCOR) 10 mg tablet Take  by mouth nightly.  traZODone (DESYREL) 50 mg tablet Take  by mouth nightly. No current facility-administered medications for this visit. Review of Symptoms:  11 systems reviewed, negative other than as stated in the HPI    Physical ExamPhysical Exam:    Vitals:    07/11/19 1302 07/11/19 1312 07/11/19 1326   BP: 140/80 138/80 132/74   Pulse: 69     Resp: 16     SpO2: 98%     Weight: 123 lb (55.8 kg)     Height: 5' 3\" (1.6 m)       Body mass index is 21.79 kg/m². General PE  Gen:  NAD  Mental Status - Alert. General Appearance - Not in acute distress.    HEENT:  PERRL, no carotid bruits or JVD  Chest and Lung Exam   Inspection: Accessory muscles - No use of accessory muscles in breathing. Auscultation:   Breath sounds: - Normal.   Cardiovascular   Inspection: Jugular vein - Bilateral - Inspection Normal.   Palpation/Percussion:   Apical Impulse: - Normal.   Auscultation: Rhythm - Regular. Heart Sounds - S1 WNL and S2 WNL. No S3 or S4. Murmurs & Other Heart Sounds: Auscultation of the heart reveals - No Murmurs. Peripheral Vascular   Upper Extremity: Inspection - Bilateral - No Cyanotic nailbeds or Digital clubbing. Lower Extremity:   Palpation: Edema - Bilateral - No edema. Abdomen:   Soft, non-tender, bowel sounds are active. Neuro: A&O times 3, CN and motor strength equal    Labs:   No results found for: CHOL, CHOLX, CHLST, CHOLV, 165895, HDL, LDL, LDLC, DLDLP, TGLX, TRIGL, TRIGP, CHHD, CHHDX  No results found for: CPK, CPKX, CPX  Lab Results   Component Value Date/Time    Sodium 140 06/19/2019 05:56 PM    Potassium 3.9 06/19/2019 05:56 PM    Chloride 106 06/19/2019 05:56 PM    CO2 29 06/19/2019 05:56 PM    Anion gap 5 06/19/2019 05:56 PM    Glucose 89 06/19/2019 05:56 PM    BUN 14 06/19/2019 05:56 PM    Creatinine 0.93 06/19/2019 05:56 PM    BUN/Creatinine ratio 15 06/19/2019 05:56 PM    GFR est AA >60 06/19/2019 05:56 PM    GFR est non-AA 60 (L) 06/19/2019 05:56 PM    Calcium 9.3 06/19/2019 05:56 PM    Bilirubin, total 0.4 06/19/2019 05:56 PM    AST (SGOT) 18 06/19/2019 05:56 PM    Alk. phosphatase 111 06/19/2019 05:56 PM    Protein, total 8.4 (H) 06/19/2019 05:56 PM    Albumin 4.5 06/19/2019 05:56 PM    Globulin 3.9 06/19/2019 05:56 PM    A-G Ratio 1.2 06/19/2019 05:56 PM    ALT (SGPT) 28 06/19/2019 05:56 PM       EKG:       Assessment:     Assessment:      1. Essential hypertension    2. Mixed hyperlipidemia    3. History of CVA (cerebrovascular accident)    4.  Rheumatoid arthritis, involving unspecified site, unspecified rheumatoid factor presence ([facility] [address]        Orders Placed This Encounter    AMB POC EKG ROUTINE W/ 12 LEADS, INTER & REP     Order Specific Question: Reason for Exam:     Answer:   ROUTINE        Plan: This is a 77 YO F with pmhx HTN HLD CVA in 1/19 while in [place] is here for test follow up. Reports peristent  numbness on left arm and left leg and some mild facial numbness left side, working with physical/occupational therapy. Was in the ED 6/19/19 for elevated BP, SBP > 200. Labs normal, EKG was SR. Was ordered to receive Labetalol but BP improved after she relaxed and got over her anxiety. She is tearful today, states that she has been more emotional since she had the stroke. Has f/u with PCP tomorrow. Hx mild monge last OV, resolved  Negative NST in 6/19  Normal EF, mild diastolic dysfunction, No PFO per echo 6/19    HTN  Controlled with current therapy: amlodipine, lisinopril     HLD  10/18 . Recently started on statin. Lipids and labs followed by PCP. 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 She immediately noticed that her left side of the body was numb and weak  Followed by DR Kit Decker       RA  On Mtx, remicade  Followed by Dr Angela Tamayo current care and f/u in 1 yr    Naty Benton NP       [facility]    7/11/2019         Patient seen, examined by me personally. Plan discussed as detailed. Agree with note as outlined by  NP. I confirm findings in history and physical exam. No additional findings noted. Agree with plan as outlined above.      Danni Coleman MD

## 2019-08-14 ENCOUNTER — OFFICE VISIT (OUTPATIENT)
Dept: NEUROLOGY | Age: 69
End: 2019-08-14

## 2019-08-14 VITALS
SYSTOLIC BLOOD PRESSURE: 120 MMHG | WEIGHT: 121 LBS | DIASTOLIC BLOOD PRESSURE: 85 MMHG | OXYGEN SATURATION: 98 % | HEIGHT: 63 IN | BODY MASS INDEX: 21.44 KG/M2 | HEART RATE: 74 BPM

## 2019-08-14 DIAGNOSIS — I10 ESSENTIAL HYPERTENSION: ICD-10-CM

## 2019-08-14 DIAGNOSIS — M79.2 NEUROPATHIC PAIN: ICD-10-CM

## 2019-08-14 DIAGNOSIS — I61.9 RIGHT-SIDED NONTRAUMATIC INTRACEREBRAL HEMORRHAGE, UNSPECIFIED CEREBRAL LOCATION (HCC): Primary | ICD-10-CM

## 2019-08-14 DIAGNOSIS — R20.0 NUMBNESS: ICD-10-CM

## 2019-08-14 RX ORDER — GABAPENTIN 100 MG/1
100 CAPSULE ORAL
Qty: 30 CAP | Refills: 2 | Status: SHIPPED | OUTPATIENT
Start: 2019-08-14 | End: 2019-09-30 | Stop reason: SDUPTHER

## 2019-08-14 NOTE — PATIENT INSTRUCTIONS
1.  Gabapentin 100 mg p.o. nightly  2. EMG/nerve conduction study  3. Follow-up in 3 to 4 months    Office Policies  · Phone calls/patient messages:  Please allow up to 24 hours for someone in the office to contact you about your call or message. Be mindful your provider may be out of the office or your message may require further review. We encourage you to use [employer] for your messages as this is a faster, more efficient way to communicate with our office  · Medication Refills:  Prescription medications require up to 48 business hours to process. We encourage you to use [employer] for your refills. For controlled medications: Please allow up to 72 business hours to process. Certain medications may require you to  a written prescription at our office. NO narcotic/controlled medications will be prescribed after 4pm Monday through Friday or on weekends  · Form/Paperwork Completion:  Please note there is a $25 fee for all paperwork completed by our providers. We ask that you allow 7-14 business days. Pre-payment is due prior to picking up/faxing the completed form. You may also download your forms to [employer] to have your doctor print off.

## 2019-08-14 NOTE — PROGRESS NOTES
Neurology Note    Chief Complaint   Patient presents with    Follow-up     numbness       HPI/Subjective  Viktoriya Denise is a 78 y.o. female who presented to the neurology office for management of stroke. To recap, patient states that she had intracranial hemorrhage on 16 January 2019. She was vacationing in [place] and stood up and started walking and all of a sudden she slipped and fell down. She immediately noticed that her left side of the body was numb and weak. There was also dysarthria. She was taken to the hospital where CT scan of the head was done which did show intracranial hemorrhage. Patient did undergo inpatient rehab and is now doing physical therapy. Patient states that the weakness has gotten better and the numbness has gotten better as well. She still does not have the full use of her left arm. She limps a bit while walking. When she is tired she gets mild dysarthria. Interval history:  Since the last visit, the patient is complaining of a burning sensation in her upper and lower extremities. The strength has improved. Current Outpatient Medications   Medication Sig    inFLIXimab (REMICADE) 100 mg injection by IntraVENous route every two (2) months.  amLODIPine (NORVASC) 2.5 mg tablet TAKE 1 TABLET BY MOUTH ONCE A DAY FOR BLOOD PRESSURE    folic acid (FOLVITE) 1 mg tablet Take  by mouth daily.  lisinopril (PRINIVIL, ZESTRIL) 20 mg tablet Take  by mouth daily.  methotrexate (RHEUMATREX) 2.5 mg tablet Take 2.5 mg by mouth. Taking 6 tabs once every Tuesday    sertraline (ZOLOFT) 50 mg tablet Take 100 mg by mouth daily. Patient taking 3 tabs for depression     simvastatin (ZOCOR) 10 mg tablet Take  by mouth nightly.  traZODone (DESYREL) 50 mg tablet Take  by mouth nightly. No current facility-administered medications for this visit.       No Known Allergies  Past Medical History:   Diagnosis Date    Arthritis     Cerebral artery occlusion with cerebral infarction [facility])     Essential hypertension      Past Surgical History:   Procedure Laterality Date    HX GYN      HX ORTHOPAEDIC       No family history on file. Social History     Tobacco Use    Smoking status: Former Smoker    Smokeless tobacco: Former User   Substance Use Topics    Alcohol use: Yes     Alcohol/week: 1.0 standard drinks     Types: 1 Cans of beer per week     Comment: occasionally    Drug use: Not on file       REVIEW OF SYSTEMS:   A ten system review of constitutional, cardiovascular, respiratory, musculoskeletal, endocrine, skin, SHEENT, genitourinary, psychiatric and neurologic systems was obtained and is unremarkable with the exception of anxiety, hearing loss and muscle weakness    EXAMINATION:   Visit Vitals  /85   Pulse 74   Ht 5' 3\" (1.6 m)   Wt 121 lb (54.9 kg)   SpO2 98%   BMI 21.43 kg/m²        General:   General appearance: Pt is in no acute distress   Distal pulses are preserved  Fundoscopic Exam: Attempted    Neurological Examination:   Mental Status: AAO x3. Speech is fluent. Follows commands, has normal fund of knowledge, attention, short term recall, comprehension and insight. Cranial Nerves: Visual fields are full. PERRL, Extraocular movements are full. Facial sensation intact. Facial movement intact. Hearing intact to conversation. Palate elevates symmetrically. Shoulder shrug symmetric. Tongue midline. Motor: Strength is 5/5     Tone: Mild spasticity in left upper and lower extremity    Sensation: Decreased left upper and lower extremity numbness    Reflexes: DTRs 2+ throughout. Coordination/Cerebellar: Intact to finger-nose-finger     Gait: While walking, patient is not flexing her left leg as much in comparison to the right. Skin: No significant bruising or lacerations.     Laboratory review:   Results for orders placed or performed in visit on 06/27/19   NUCLEAR CARDIAC STRESS TEST   Result Value Ref Range    Target  bpm    Baseline HR 68 bpm    Baseline  mmHg    Percent HR 65 %    Post peak HR 98 bpm    ST Elevation (mm) 0 mm    ST Depression (mm) 0 mm    Stress Base Diastolic BP 88 mmHg    Stress Base Systolic  mmHg    Stress Base Diastolic BP 80 mmHg    Stress Rate Pressure Product 13,328 bpm*mmHg       Imaging review:  None    Documentation review:  None    Assessment/Plan:   Alton Griffin is a 78 y.o. female who presented to the neurology office for management of intracranial hemorrhage. Patient had intracranial hemorrhage in January 2019 which left her with left-sided weakness, numbness and dysarthria. Symptoms are significantly better. Patient has completed physical therapy and has 2 more sessions of occupational therapy left. Her blood pressure is under good control now. Goal blood pressure is less than 140/100. Her new complaint is burning sensation in both of her hands and some numbness in her feet as well. I am going to evaluate for peripheral neuropathy and for bilateral carpal tunnel syndrome. I am going to get records from her admission for stroke from [place]. Follow-up in 3 months. 3 most recent PHQ Screens 7/11/2019   Little interest or pleasure in doing things Not at all   Feeling down, depressed, irritable, or hopeless Not at all   Total Score PHQ 2 0     Primary care to address possible depression if PHQ-9 score is more than 9. ICD-10-CM ICD-9-CM    1. Right-sided nontraumatic intracerebral hemorrhage, unspecified cerebral location (HCC) I61.9 431    2. Essential hypertension I10 401.9       Thank you for allowing me to participate in the care of  [facility]. Please feel free to contact me if you have any questions. Isidro Alexandra MD  Neurologist    CC: Sb Lea MD  Fax: [phone]    This note was created using voice recognition software. Despite editing, there may be syntax errors.

## 2019-08-14 NOTE — LETTER
8/14/19 Patient: Gege Rocha YOB: 1950 Date of Visit: 8/14/2019 Eufemia Chavez MD 
[address] 
[address] [address] VIA Facsimile: [phone] Dear Eufemia Chavez MD, Thank you for referring Ms. Rose Gonzalez to [address] [place] [address] for evaluation. My notes for this consultation are attached. If you have questions, please do not hesitate to call me. I look forward to following your patient along with you. Sincerely, Aida Breaux MD

## 2019-09-12 ENCOUNTER — OFFICE VISIT (OUTPATIENT)
Dept: NEUROLOGY | Age: 69
End: 2019-09-12

## 2019-09-12 VITALS
HEIGHT: 63 IN | DIASTOLIC BLOOD PRESSURE: 87 MMHG | HEART RATE: 71 BPM | SYSTOLIC BLOOD PRESSURE: 148 MMHG | BODY MASS INDEX: 21.44 KG/M2 | OXYGEN SATURATION: 94 % | WEIGHT: 121 LBS

## 2019-09-12 DIAGNOSIS — G56.03 BILATERAL CARPAL TUNNEL SYNDROME: Primary | ICD-10-CM

## 2019-09-12 NOTE — PROCEDURES
ELECTRODIAGNOSTIC REPORT      Test Date:  2019    Patient: Heriberto Almaguer  : 1950 Physician: Hali Ventura M.D.   ID#: [id number] SEX: Female Ref. Phys: Hali Ventura M.D. Patient History / Exam:  For peripheral neuropathy and bilateral carpal tunnel syndrome. Also check for cervical radiculopathy. EMG & NCV Findings:  Evaluation of the right Fibular motor nerve showed normal distal onset latency (3.0 ms), normal amplitude (1.9 mV), normal conduction velocity (B Fib-Ankle, 48 m/s), and normal conduction velocity (Poplt-B Fib, 71 m/s). The left median motor nerve showed normal distal onset latency (3.0 ms), normal amplitude (5.0 mV), and normal conduction velocity (Elbow-Wrist, 62 m/s). The right median motor nerve showed prolonged distal onset latency (4.9 ms), reduced amplitude (3.0 mV), and normal conduction velocity (Elbow-Wrist, 63 m/s). The right tibial motor nerve showed normal distal onset latency (3.9 ms), normal amplitude (8.8 mV), and normal conduction velocity (Knee-Ankle, 49 m/s). The right ulnar motor nerve showed normal distal onset latency (2.6 ms), reduced amplitude (6.3 mV), normal conduction velocity (B Elbow-Wrist, 63 m/s), and normal conduction velocity (A Elbow-B Elbow, 83 m/s). The left median sensory, the right sural sensory, and the right ulnar sensory nerves showed normal distal peak latency (L3.1, R3.3, R3.4 ms) and normal amplitude (L23.8, R12.1, R17.8 µV). The right median sensory nerve showed prolonged distal peak latency (4.2 ms) and reduced amplitude (6.8 µV). The right Sup Fibular sensory nerve showed normal distal peak latency (2.6 ms), normal amplitude (10.8 µV), and normal conduction velocity (Lower leg-Lat ankle, 48 m/s).   The left median/ulnar (palm) comparison nerve showed normal distal onset latency (Median Palm, 1.8 ms), normal distal peak latency (Median Palm, 2.1 ms), normal amplitude (Median Palm, 21.9 µV), normal distal onset latency (Ulnar Palm, 0.9 ms), normal distal peak latency (Ulnar Palm, 1.6 ms), normal amplitude (Ulnar Palm, 18.4 µV), and abnormal onset latency difference (Median Palm-Ulnar Palm, 0.9 ms). The right median/ulnar (palm) comparison nerve showed normal distal onset latency (Median Palm, 1.9 ms), prolonged distal peak latency (Median Palm, 2.4 ms), normal amplitude (Median Palm, 10.2 µV), normal distal onset latency (Ulnar Palm, 1.2 ms), normal distal peak latency (Ulnar Palm, 1.6 ms), normal amplitude (Ulnar Palm, 19.7 µV), and abnormal onset latency difference (Median Palm-Ulnar Palm, 0.7 ms). All left vs. right side differences were within normal limits. All F Wave latencies were within normal limits. Needle evaluation of the right abductor pollicis brevis muscle showed diminished recruitment, Incr Duration, and increased motor unit amplitude. All remaining muscles (as indicated in the following table) showed no evidence of electrical instability. Impression: This is an abnormal study. There is electrophysiological evidence of bilateral median neuropathies at the wrists. There is no electrophysiological evidence of a length dependent polyneuropathy or a left cervical radiculopathy. ___________________________  S.  Teresa Reyes M.D.    Nerve Conduction Studies  Anti Sensory Summary Table     Stim Site NR Peak (ms) Norm Peak (ms) P-T Amp (µV) Norm P-T Amp Site1 Site2 Dist (cm)   Left Median Anti Sensory (2nd Digit)  36.3°C   Wrist    3.1 <4 23.8 >13 Wrist 2nd Digit 14.0   Right Median Anti Sensory (2nd Digit)  36°C   Wrist    4.2 <4 6.8 >13 Wrist 2nd Digit 14.0   Right Sup Fibular Anti Sensory (Lat ankle)  35.3°C   Lower leg    2.6 <4.6 10.8 >4 Lower leg Lat ankle 10.0   Right Sural Anti Sensory (Lat Mall)  35.5°C   Calf    3.3 <4.5 12.1 >4.0 Calf Lat Mall 14.0   Right Ulnar Anti Sensory (5th Digit)  35.9°C   Wrist    3.4 <4.0 17.8 >9 Wrist 5th Digit 14.0     Motor Summary Table     Stim Site NR Onset (ms) Norm Onset (ms) O-P Amp (mV) Norm O-P Amp P-T Amp (mV) Site1 Site2 Dist (cm) Jhonny (m/s)   Right Fibular Motor (Ext Dig Brev)  33.1°C   Ankle    3.0 <6.5 1.9 >1.1 3.4 Ankle Ext Dig Brev 8.0    B Fib    9.6  1.8  3.1 B Fib Ankle 32.0 48   Poplt    11.0  1.8  3.1 Poplt B Fib 10.0 71   Left Median Motor (Abd Poll Brev)  36.2°C   Wrist    3.0 <4.5 5.0 >4.1 7.4 Wrist Abd Poll Brev 8.0    Elbow    6.7  4.3  6.5 Elbow Wrist 23.0 62   Right Median Motor (Abd Poll Brev)  35.7°C   Wrist    4.9 <4.5 3.0 >4.1 4.1 Wrist Abd Poll Brev 8.0    Elbow    8.4  3.0  4.1 Elbow Wrist 22.0 63   Right Tibial Motor (Abd Serrano Brev)  34.5°C   Ankle    3.9 <6.1 8.8 >1.1 11.9 Ankle Abd Serrano Brev 8.0    Knee    10.6  7.0  9.5 Knee Ankle 33.0 49   Right Ulnar Motor (Abd Dig Minimi)  35.8°C   Wrist    2.6 <3.1 6.3 >7.0 11.0 Wrist Abd Dig Minimi 8.0    B Elbow    5.8  6.3  10.5 B Elbow Wrist 20.0 63   A Elbow    7.0  6.2  10.2 A Elbow B Elbow 10.0 83     Comparison Summary Table     Stim Site NR Peak (ms) P-T Amp (µV) Site1 Site2 Dist (cm) Delta-0 (ms)   Left Median/Ulnar Palm Comparison (Wrist)  36.3°C   Median Palm    2.1 13.6 Median Palm Ulnar Palm 8.0 0.9   Ulnar Palm    1.6 14.8       Right Median/Ulnar Palm Comparison (Wrist)  36.1°C   Median Palm    2.4 12.5 Median Palm Ulnar Palm 8.0 0.7   Ulnar Palm    1.6 8.2         F Wave Studies     NR F-Lat (ms) Lat Norm (ms) L-R F-Lat (ms) L-R Lat Norm   Right Tibial (Mrkrs) (Abd Hallucis)  34.9°C      46.01 <56  <5.7   Right Ulnar (Mrkrs) (Abd Dig Min)  35.9°C      25.77 <32  <2.5       EMG     Side Muscle Nerve Root Ins Act Fibs Psw Recrt Duration Amp Poly Comment   Left 1stDorInt Ulnar C8-T1 Nml Nml Nml Nml Nml Nml Nml    Left Abd Poll Brev Median C8-T1 Nml Nml Nml Nml Nml Nml Nml    Left PronatorTeres Median C6-7 Nml Nml Nml Nml Nml Nml Nml    Left Biceps Musculocut C5-6 Nml Nml Nml Nml Nml Nml Nml    Left Triceps Radial C6-7-8 Nml Nml Nml Nml Nml Nml Nml    Left Deltoid Axillary C5-6 Nml Nml Nml Nml Nml Nml Nml    Right 1stDorInt Ulnar C8-T1 Nml Nml Nml Nml Nml Nml Nml    Right Abd Poll Brev Median C8-T1 Nml Nml Nml Reduced Incr Incr Nml    Left AntTibialis Dp Br Peron L4-5 Nml Nml Nml Nml Nml Nml Nml    Left MedGastroc Tibial S1-2 Nml Nml Nml Nml Nml Nml Nml    Left VastusLat Femoral L2-4 Nml Nml Nml Nml Nml Nml Nml      Waveforms:

## 2019-09-30 DIAGNOSIS — M79.2 NEUROPATHIC PAIN: ICD-10-CM

## 2019-10-01 RX ORDER — GABAPENTIN 100 MG/1
100 CAPSULE ORAL
Qty: 30 CAP | Refills: 2 | Status: SHIPPED | OUTPATIENT
Start: 2019-10-01 | End: 2020-07-20 | Stop reason: DRUGHIGH

## 2019-10-02 ENCOUNTER — DOCUMENTATION ONLY (OUTPATIENT)
Dept: NEUROLOGY | Age: 69
End: 2019-10-02

## 2020-01-22 DIAGNOSIS — M79.2 NEUROPATHIC PAIN: ICD-10-CM

## 2020-01-22 RX ORDER — GABAPENTIN 100 MG/1
100 CAPSULE ORAL
Qty: 30 CAP | Refills: 2 | Status: CANCELLED | OUTPATIENT
Start: 2020-01-22

## 2020-01-27 ENCOUNTER — TELEPHONE (OUTPATIENT)
Dept: NEUROLOGY | Age: 70
End: 2020-01-27

## 2020-01-27 NOTE — TELEPHONE ENCOUNTER
Pt is a pt of Dr Lidia Washington but would like to transfer to see Dr Huber Summers. Would like to see someone who specializes in her symptoms. Do you approve?  Please advise

## 2020-01-28 NOTE — TELEPHONE ENCOUNTER
Okay for a ONE TIME visit only to discuss her brain injury however she will need to stay with Dr. Celia Story who is managing her neuropathy and medications. I reviewed the chart. This is not an acute urgent issue. If they would like a one-time visit they can schedule for the next available and bring the records from [facility]. I will not be accepting this patient permanently.

## 2020-05-04 ENCOUNTER — APPOINTMENT (OUTPATIENT)
Dept: GENERAL RADIOLOGY | Age: 70
End: 2020-05-04
Attending: EMERGENCY MEDICINE
Payer: MEDICARE

## 2020-05-04 ENCOUNTER — APPOINTMENT (OUTPATIENT)
Dept: CT IMAGING | Age: 70
End: 2020-05-04
Attending: EMERGENCY MEDICINE
Payer: MEDICARE

## 2020-05-04 ENCOUNTER — APPOINTMENT (OUTPATIENT)
Dept: GENERAL RADIOLOGY | Age: 70
End: 2020-05-04
Attending: PHYSICIAN ASSISTANT
Payer: MEDICARE

## 2020-05-04 ENCOUNTER — HOSPITAL ENCOUNTER (EMERGENCY)
Age: 70
Discharge: HOME | End: 2020-05-04
Attending: EMERGENCY MEDICINE
Payer: MEDICARE

## 2020-05-04 VITALS
TEMPERATURE: 98.4 F | SYSTOLIC BLOOD PRESSURE: 101 MMHG | HEIGHT: 63 IN | WEIGHT: 121 LBS | BODY MASS INDEX: 21.44 KG/M2 | OXYGEN SATURATION: 92 % | HEART RATE: 90 BPM | RESPIRATION RATE: 15 BRPM | DIASTOLIC BLOOD PRESSURE: 77 MMHG

## 2020-05-04 DIAGNOSIS — S62.102B OPEN FRACTURE OF LEFT WRIST, INITIAL ENCOUNTER: Primary | ICD-10-CM

## 2020-05-04 PROBLEM — S62.102A LEFT WRIST FRACTURE: Status: ACTIVE | Noted: 2020-05-04

## 2020-05-04 LAB
ANION GAP SERPL CALC-SCNC: 6 MMOL/L (ref 5–15)
BASOPHILS # BLD: 0 K/UL (ref 0–0.1)
BASOPHILS NFR BLD: 0 % (ref 0–1)
BUN SERPL-MCNC: 15 MG/DL (ref 6–20)
BUN/CREAT SERPL: 16 (ref 12–20)
CALCIUM SERPL-MCNC: 8.9 MG/DL (ref 8.5–10.1)
CHLORIDE SERPL-SCNC: 105 MMOL/L (ref 97–108)
CO2 SERPL-SCNC: 27 MMOL/L (ref 21–32)
CREAT SERPL-MCNC: 0.94 MG/DL (ref 0.55–1.02)
DIFFERENTIAL METHOD BLD: ABNORMAL
EOSINOPHIL # BLD: 0 K/UL (ref 0–0.4)
EOSINOPHIL NFR BLD: 0 % (ref 0–7)
ERYTHROCYTE [DISTWIDTH] IN BLOOD BY AUTOMATED COUNT: 13.6 % (ref 11.5–14.5)
GLUCOSE SERPL-MCNC: 114 MG/DL (ref 65–100)
HCT VFR BLD AUTO: 41.7 % (ref 35–47)
HGB BLD-MCNC: 14 G/DL (ref 11.5–16)
IMM GRANULOCYTES # BLD AUTO: 0 K/UL (ref 0–0.04)
IMM GRANULOCYTES NFR BLD AUTO: 0 % (ref 0–0.5)
LYMPHOCYTES # BLD: 1.5 K/UL (ref 0.8–3.5)
LYMPHOCYTES NFR BLD: 14 % (ref 12–49)
MCH RBC QN AUTO: 32.9 PG (ref 26–34)
MCHC RBC AUTO-ENTMCNC: 33.6 G/DL (ref 30–36.5)
MCV RBC AUTO: 97.9 FL (ref 80–99)
MONOCYTES # BLD: 0.6 K/UL (ref 0–1)
MONOCYTES NFR BLD: 6 % (ref 5–13)
NEUTS SEG # BLD: 8.5 K/UL (ref 1.8–8)
NEUTS SEG NFR BLD: 80 % (ref 32–75)
NRBC # BLD: 0 K/UL (ref 0–0.01)
NRBC BLD-RTO: 0 PER 100 WBC
PLATELET # BLD AUTO: 310 K/UL (ref 150–400)
PMV BLD AUTO: 9.1 FL (ref 8.9–12.9)
POTASSIUM SERPL-SCNC: 4 MMOL/L (ref 3.5–5.1)
RBC # BLD AUTO: 4.26 M/UL (ref 3.8–5.2)
SODIUM SERPL-SCNC: 138 MMOL/L (ref 136–145)
WBC # BLD AUTO: 10.7 K/UL (ref 3.6–11)

## 2020-05-04 PROCEDURE — 36415 COLL VENOUS BLD VENIPUNCTURE: CPT

## 2020-05-04 PROCEDURE — 96376 TX/PRO/DX INJ SAME DRUG ADON: CPT

## 2020-05-04 PROCEDURE — 74011000250 HC RX REV CODE- 250: Performed by: EMERGENCY MEDICINE

## 2020-05-04 PROCEDURE — 80048 BASIC METABOLIC PNL TOTAL CA: CPT

## 2020-05-04 PROCEDURE — 93005 ELECTROCARDIOGRAM TRACING: CPT

## 2020-05-04 PROCEDURE — 73110 X-RAY EXAM OF WRIST: CPT

## 2020-05-04 PROCEDURE — 70450 CT HEAD/BRAIN W/O DYE: CPT

## 2020-05-04 PROCEDURE — 96375 TX/PRO/DX INJ NEW DRUG ADDON: CPT

## 2020-05-04 PROCEDURE — 74011250636 HC RX REV CODE- 250/636: Performed by: PHYSICIAN ASSISTANT

## 2020-05-04 PROCEDURE — 99284 EMERGENCY DEPT VISIT MOD MDM: CPT

## 2020-05-04 PROCEDURE — 85025 COMPLETE CBC W/AUTO DIFF WBC: CPT

## 2020-05-04 PROCEDURE — 96374 THER/PROPH/DIAG INJ IV PUSH: CPT

## 2020-05-04 PROCEDURE — 74011250636 HC RX REV CODE- 250/636: Performed by: EMERGENCY MEDICINE

## 2020-05-04 PROCEDURE — 73100 X-RAY EXAM OF WRIST: CPT

## 2020-05-04 RX ORDER — HYDROMORPHONE HYDROCHLORIDE 1 MG/ML
1 INJECTION, SOLUTION INTRAMUSCULAR; INTRAVENOUS; SUBCUTANEOUS
Status: DISCONTINUED | OUTPATIENT
Start: 2020-05-04 | End: 2020-05-04 | Stop reason: HOSPADM

## 2020-05-04 RX ORDER — SODIUM CHLORIDE 9 MG/ML
500 INJECTION, SOLUTION INTRAVENOUS ONCE
Status: DISCONTINUED | OUTPATIENT
Start: 2020-05-04 | End: 2020-05-04

## 2020-05-04 RX ORDER — LIDOCAINE HYDROCHLORIDE 10 MG/ML
10 INJECTION, SOLUTION EPIDURAL; INFILTRATION; INTRACAUDAL; PERINEURAL ONCE
Status: COMPLETED | OUTPATIENT
Start: 2020-05-04 | End: 2020-05-04

## 2020-05-04 RX ORDER — CEPHALEXIN 500 MG/1
500 CAPSULE ORAL 4 TIMES DAILY
Qty: 28 CAP | Refills: 0 | Status: SHIPPED | OUTPATIENT
Start: 2020-05-04 | End: 2020-05-11

## 2020-05-04 RX ORDER — OXYCODONE HYDROCHLORIDE 5 MG/1
5 TABLET ORAL
Qty: 12 TAB | Refills: 0 | Status: SHIPPED | OUTPATIENT
Start: 2020-05-04 | End: 2020-05-08

## 2020-05-04 RX ORDER — HYDROMORPHONE HYDROCHLORIDE 1 MG/ML
1 INJECTION, SOLUTION INTRAMUSCULAR; INTRAVENOUS; SUBCUTANEOUS ONCE
Status: COMPLETED | OUTPATIENT
Start: 2020-05-04 | End: 2020-05-04

## 2020-05-04 RX ORDER — ACETAMINOPHEN 500 MG/1
1000 CAPSULE, LIQUID FILLED ORAL
Qty: 20 CAP | Refills: 0 | Status: SHIPPED | OUTPATIENT
Start: 2020-05-04 | End: 2020-05-07 | Stop reason: CLARIF

## 2020-05-04 RX ADMIN — LIDOCAINE HYDROCHLORIDE 10 ML: 10 INJECTION, SOLUTION EPIDURAL; INFILTRATION; INTRACAUDAL; PERINEURAL at 09:39

## 2020-05-04 RX ADMIN — WATER 2 G: 1 INJECTION INTRAMUSCULAR; INTRAVENOUS; SUBCUTANEOUS at 11:11

## 2020-05-04 RX ADMIN — HYDROMORPHONE HYDROCHLORIDE 1 MG: 1 INJECTION, SOLUTION INTRAMUSCULAR; INTRAVENOUS; SUBCUTANEOUS at 08:50

## 2020-05-04 RX ADMIN — HYDROMORPHONE HYDROCHLORIDE 1 MG: 1 INJECTION, SOLUTION INTRAMUSCULAR; INTRAVENOUS; SUBCUTANEOUS at 10:35

## 2020-05-04 NOTE — PROGRESS NOTES
Nurse Clarification of the Prior to Admission Medication Regimen     The patient was interviewed regarding clarification of the prior to admission medication regimen. The individual(s) listed above were questioned regarding the patient's use of any other inhalers, topical products, over the counter medications, herbal medications, vitamin products or ophthalmic/nasal/otic medication use. Information Obtained From: patient med bottles    Recommendations/Findings: The following amendments were made to the patient's active medication list on file at [facility]:     1) Additions:        2) Removals:        3) Changes:       PTA medication list was corrected to the following:     Prior to Admission Medications   Prescriptions Last Dose Informant Taking? Arm Brace misc   No   Sig: Bilateral carpal tunnel splint to be used every night   amLODIPine (NORVASC) 2.5 mg tablet   Yes   Sig: TAKE 1 TABLET BY MOUTH ONCE A DAY FOR BLOOD PRESSURE   folic acid (FOLVITE) 1 mg tablet   Yes   Sig: Take  by mouth daily. gabapentin (NEURONTIN) 100 mg capsule   Yes   Sig: Take 1 Cap by mouth nightly. Max Daily Amount: 100 mg. inFLIXimab (REMICADE) 100 mg injection   Yes   Sig: by IntraVENous route every two (2) months. lisinopril (PRINIVIL, ZESTRIL) 20 mg tablet   Yes   Sig: Take  by mouth daily. methotrexate (RHEUMATREX) 2.5 mg tablet   Yes   Sig: Take 2.5 mg by mouth. Taking 6 tabs once every Tuesday   sertraline (ZOLOFT) 50 mg tablet   Yes   Sig: Take 100 mg by mouth daily. Patient taking 3 tabs for depression    simvastatin (ZOCOR) 10 mg tablet   Yes   Sig: Take  by mouth nightly. traZODone (DESYREL) 50 mg tablet   Yes   Sig: Take  by mouth nightly.       Facility-Administered Medications: None        Thank you,  Jose A Mazariegos RN

## 2020-05-04 NOTE — CONSULTS
Orthopedic CONSULT NOTE    Subjective:     Date of Consultation:  May 4, 2020      Jay Swanson is a 69 y.o. female who is being seen for left wrist fracture after fall. Injury occurred  yesterday while Pt. was at home, has chronic pain . Workup has revealed left distal radius fracture. Patient's ambulatory status includes None and their living environment is home. Pt. Denies head trauma/LOC during injury. Pt. Is left hand dominant. Lives alone. Had stroke and has left side weakness . Patient Active Problem List    Diagnosis Date Noted    Left wrist fracture 05/04/2020     No family history on file. Social History     Tobacco Use    Smoking status: Former Smoker    Smokeless tobacco: Former User   Substance Use Topics    Alcohol use: Yes     Alcohol/week: 1.0 standard drinks     Types: 1 Cans of beer per week     Comment: occasionally     Past Medical History:   Diagnosis Date    Arthritis     Cerebral artery occlusion with cerebral infarction ([facility] [address]     Essential hypertension       Past Surgical History:   Procedure Laterality Date    HX GYN      HX ORTHOPAEDIC        Prior to Admission medications    Medication Sig Start Date End Date Taking? Authorizing Provider   gabapentin (NEURONTIN) 100 mg capsule Take 1 Cap by mouth nightly. Max Daily Amount: 100 mg. 10/1/19  Yes Reagan Wagner MD   inFLIXimab (REMICADE) 100 mg injection by IntraVENous route every two (2) months. Yes Provider, Historical   amLODIPine (NORVASC) 2.5 mg tablet TAKE 1 TABLET BY MOUTH ONCE A DAY FOR BLOOD PRESSURE 5/22/19  Yes Provider, Historical   folic acid (FOLVITE) 1 mg tablet Take  by mouth daily. Yes Provider, Historical   lisinopril (PRINIVIL, ZESTRIL) 20 mg tablet Take  by mouth daily. Yes Provider, Historical   methotrexate (RHEUMATREX) 2.5 mg tablet Take 2.5 mg by mouth. Taking 6 tabs once every Tuesday   Yes Provider, Historical   sertraline (ZOLOFT) 50 mg tablet Take 100 mg by mouth daily.  Patient taking 3 tabs for depression    Yes Provider, Historical   simvastatin (ZOCOR) 10 mg tablet Take  by mouth nightly. Yes Provider, Historical   traZODone (DESYREL) 50 mg tablet Take  by mouth nightly. Yes Provider, Historical   Arm Brace misc Bilateral carpal tunnel splint to be used every night 19   Olivia Winn MD     Current Facility-Administered Medications   Medication Dose Route Frequency    lidocaine (PF) (XYLOCAINE) 10 mg/mL (1 %) injection 10 mL  10 mL IntraDERMal ONCE     Current Outpatient Medications   Medication Sig    gabapentin (NEURONTIN) 100 mg capsule Take 1 Cap by mouth nightly. Max Daily Amount: 100 mg.    inFLIXimab (REMICADE) 100 mg injection by IntraVENous route every two (2) months.  amLODIPine (NORVASC) 2.5 mg tablet TAKE 1 TABLET BY MOUTH ONCE A DAY FOR BLOOD PRESSURE    folic acid (FOLVITE) 1 mg tablet Take  by mouth daily.  lisinopril (PRINIVIL, ZESTRIL) 20 mg tablet Take  by mouth daily.  methotrexate (RHEUMATREX) 2.5 mg tablet Take 2.5 mg by mouth. Taking 6 tabs once every Tuesday    sertraline (ZOLOFT) 50 mg tablet Take 100 mg by mouth daily. Patient taking 3 tabs for depression     simvastatin (ZOCOR) 10 mg tablet Take  by mouth nightly.  traZODone (DESYREL) 50 mg tablet Take  by mouth nightly.  Arm Brace misc Bilateral carpal tunnel splint to be used every night      No Known Allergies     Review of Systems:  A comprehensive review of systems was negative except for that written in the HPI. Mental Status: no dementia    Objective:     Patient Vitals for the past 8 hrs:   BP Temp Pulse Resp SpO2 Height Weight   20 0837 157/90 98.4 °F (36.9 °C) 80 18 97 % 5' 3\" (1.6 m) 54.9 kg (121 lb)     Temp (24hrs), Av.4 °F (36.9 °C), Min:98.4 °F (36.9 °C), Max:98.4 °F (36.9 °C)      EXAM: alert, cooperative, no distress, oriented, normal, normal mood, clear to auscultation bilaterally, regular rate and rhythm, soft, non-tender.  Bowel sounds normal. No masses, no organomegaly, purpura / ecchymosis noted on face, trunk and extremities, Pt. Is TTP over left wrist with deformity. Spine and Scalp w/o step off TTP or deformity. ;arge amount of swelling. Theres a small volar opening in the skin. Area was cleansed and covered with 2x2. Imaging Review: XRStudy Result     EXAM: XR WRIST LT AP/LAT/OBL MIN 3V     INDICATION: fracture.     COMPARISON: None.     FINDINGS: 4 views of the left wrist demonstrate moderate diffuse osteopenia. There is comminuted fracture is of the distal radius and ulna. The distal radius  fracture is dorsally impacted, and shows articular extension to the radiocarpal  joint with at least 75% dorsal displacement and angular deformity. The ulnar  fracture is also comminuted showing approximately 50% dorsal displacement and  moderate dorsal angular deformity. No dislocation or other fracture is shown. Mild scaphomultangular and moderate thumb carpometacarpal, thumb  interphalangeal, and index metacarpophalangeal osteoarthrosis is shown.     IMPRESSION  IMPRESSION: Comminuted fractures of distal radius and ulna            Labs:   Recent Results (from the past 24 hour(s))   CBC WITH AUTOMATED DIFF    Collection Time: 05/04/20  8:43 AM   Result Value Ref Range    WBC 10.7 3.6 - 11.0 K/uL    RBC 4.26 3.80 - 5.20 M/uL    HGB 14.0 11.5 - 16.0 g/dL    HCT 41.7 35.0 - 47.0 %    MCV 97.9 80.0 - 99.0 FL    MCH 32.9 26.0 - 34.0 PG    MCHC 33.6 30.0 - 36.5 g/dL    RDW 13.6 11.5 - 14.5 %    PLATELET 723 922 - 271 K/uL    MPV 9.1 8.9 - 12.9 FL    NRBC 0.0 0  WBC    ABSOLUTE NRBC 0.00 0.00 - 0.01 K/uL    NEUTROPHILS 80 (H) 32 - 75 %    LYMPHOCYTES 14 12 - 49 %    MONOCYTES 6 5 - 13 %    EOSINOPHILS 0 0 - 7 %    BASOPHILS 0 0 - 1 %    IMMATURE GRANULOCYTES 0 0.0 - 0.5 %    ABS. NEUTROPHILS 8.5 (H) 1.8 - 8.0 K/UL    ABS. LYMPHOCYTES 1.5 0.8 - 3.5 K/UL    ABS. MONOCYTES 0.6 0.0 - 1.0 K/UL    ABS. EOSINOPHILS 0.0 0.0 - 0.4 K/UL    ABS.  BASOPHILS 0.0 0.0 - 0.1 K/UL    ABS. IMM. GRANS. 0.0 0.00 - 0.04 K/UL    DF AUTOMATED     METABOLIC PANEL, BASIC    Collection Time: 05/04/20  8:43 AM   Result Value Ref Range    Sodium 138 136 - 145 mmol/L    Potassium 4.0 3.5 - 5.1 mmol/L    Chloride 105 97 - 108 mmol/L    CO2 27 21 - 32 mmol/L    Anion gap 6 5 - 15 mmol/L    Glucose 114 (H) 65 - 100 mg/dL    BUN 15 6 - 20 MG/DL    Creatinine 0.94 0.55 - 1.02 MG/DL    BUN/Creatinine ratio 16 12 - 20      GFR est AA >60 >60 ml/min/1.73m2    GFR est non-AA 59 (L) >60 ml/min/1.73m2    Calcium 8.9 8.5 - 10.1 MG/DL         Impression:     Patient Active Problem List    Diagnosis Date Noted    Left wrist fracture 05/04/2020     Active Problems:    Left wrist fracture (5/4/2020)    PROCEDURE NOTE - FRACTURE REDUCTION: The patient was anesthetized with lidocaine using a hematoma block . After it was confirmed that appropriate anesthesia had been reached, a longitudinal traction in conjunction with re-creation of the injury maneuver was applied reducing the fracture. Subsequently, a sugar tong splint  was applied. The patient was aroused from anesthesia and tolerated the procedure well. Post-reduction plain films reviewed with confirmation of a satisfactory reduction of the fracture. The extremity was neurovascularly intact post reduction and splint placement. The volar wound was cleansed with betadine and covered         Plan:   Pt. Stable  Non-Operative Management at this time. 2 grams iv ancef and dc on keflex for 7 days  Pt. Immobilized with sugar tong after reduction using hematoma block. Non weight bear LUE with sling. Pain meds per primary care team.  Pt. To be discharged to home. F/u with Dr. Francesco Fonseca within 1 week by calling for an appointment at [phone]. Dr. Ada Baron aware and agree with above plan.       Rufina Espana PA-C

## 2020-05-04 NOTE — ED PROVIDER NOTES
EMERGENCY DEPARTMENT HISTORY AND PHYSICAL EXAM      Date: 5/4/2020  Patient Name: Ángel Enciso    Please note that this dictation was completed with [employer], the computer voice recognition software. Quite often unanticipated grammatical, syntax, homophones, and other interpretive errors are inadvertently transcribed by the computer software. Please disregard these errors. Please excuse any errors that have escaped final proofreading. History of Presenting Illness     Chief Complaint   Patient presents with    Fall     pt was walking up steps yesterday when she lost her balance and fell down two steps. pt states she hit the concret. pt noted to have deformity to left arm, abrasion to left knee, and laceration to left upper eyebrow. History Provided By: Patient    HPI: Ángel Enciso, 69 y.o. female, With a past medical history significant for hypertension, hyperlipidemia, CVA in January 2019 with chronic left-sided neuropathic pain presenting the emergency department with ground-level fall that occurred last night. Fell on outstretched hand and did hit her head. No loss of consciousness. Not on anticoagulation. Reports severe left arm pain. She did not come in as she thought it was likely related to her neuropathic pain, but symptoms continue to got worse today and she noticed deformity of the left wrist..  Rates the pain is severe, was given 50 mcg of fentanyl prehospital with no relief. Reports chronic decreased sensation in the left hand and arm, she reports this is unchanged. PCP: Faiza Castillo MD    No current facility-administered medications on file prior to encounter. Current Outpatient Medications on File Prior to Encounter   Medication Sig Dispense Refill    gabapentin (NEURONTIN) 100 mg capsule Take 1 Cap by mouth nightly. Max Daily Amount: 100 mg. 30 Cap 2    inFLIXimab (REMICADE) 100 mg injection by IntraVENous route every two (2) months.       amLODIPine (NORVASC) 2.5 mg tablet TAKE 1 TABLET BY MOUTH ONCE A DAY FOR BLOOD PRESSURE      folic acid (FOLVITE) 1 mg tablet Take  by mouth daily.  lisinopril (PRINIVIL, ZESTRIL) 20 mg tablet Take  by mouth daily.  methotrexate (RHEUMATREX) 2.5 mg tablet Take 2.5 mg by mouth. Taking 6 tabs once every Tuesday      sertraline (ZOLOFT) 50 mg tablet Take 100 mg by mouth daily. Patient taking 3 tabs for depression       simvastatin (ZOCOR) 10 mg tablet Take  by mouth nightly.  traZODone (DESYREL) 50 mg tablet Take  by mouth nightly.  Arm Brace misc Bilateral carpal tunnel splint to be used every night 2 Each 0       Past History     Past Medical History:  Past Medical History:   Diagnosis Date    Arthritis     Cerebral artery occlusion with cerebral infarction ([facility] [address]     Essential hypertension        Past Surgical History:  Past Surgical History:   Procedure Laterality Date    HX GYN      HX ORTHOPAEDIC         Family History:  No family history on file. Social History:  Social History     Tobacco Use    Smoking status: Former Smoker    Smokeless tobacco: Former User   Substance Use Topics    Alcohol use: Yes     Alcohol/week: 1.0 standard drinks     Types: 1 Cans of beer per week     Comment: occasionally    Drug use: Not on file       Allergies:  No Known Allergies      Review of Systems   Review of Systems   Constitutional: Negative for chills and fever. HENT: Negative for congestion and sore throat. Eyes: Negative for visual disturbance. Respiratory: Negative for cough and shortness of breath. Cardiovascular: Negative for chest pain and leg swelling. Gastrointestinal: Negative for abdominal pain, blood in stool, diarrhea and nausea. Endocrine: Negative for polyuria. Genitourinary: Negative for dysuria, flank pain, vaginal bleeding and vaginal discharge. Musculoskeletal: Positive for arthralgias and myalgias. Skin: Negative for rash.    Allergic/Immunologic: Negative for immunocompromised state.   Neurological: Negative for weakness and headaches. Psychiatric/Behavioral: Negative for confusion. Physical Exam   Physical Exam  Vitals signs and nursing note reviewed. Constitutional:       Appearance: She is well-developed. Comments: Uncomfortable if appearing female, mild distress   HENT:      Head: Normocephalic. Comments: Abrasion to the left forehead  Eyes:      General:         Right eye: No discharge. Left eye: No discharge. Conjunctiva/sclera: Conjunctivae normal.      Pupils: Pupils are equal, round, and reactive to light. Neck:      Musculoskeletal: Normal range of motion and neck supple. Trachea: No tracheal deviation. Cardiovascular:      Rate and Rhythm: Normal rate and regular rhythm. Heart sounds: Normal heart sounds. No murmur. Pulmonary:      Effort: Pulmonary effort is normal. No respiratory distress. Breath sounds: Normal breath sounds. No wheezing or rales. Abdominal:      General: Bowel sounds are normal.      Palpations: Abdomen is soft. Tenderness: There is no abdominal tenderness. There is no guarding or rebound. Musculoskeletal:         General: Deformity present. Left wrist: She exhibits decreased range of motion, tenderness, bony tenderness, swelling and deformity. Comments: S shaped deformity of the left wrist, unable to flex or extend at the wrist.  Patient will not move her fingers secondary to pain. Normal sensation over the median nerve, but reports decreased sensation over the radius and ulnar nerve distributions, but she states this is chronic. Left knee: Small abrasion, full range of motion, no effusion or ligamentous instability   Skin:     General: Skin is warm and dry. Findings: No erythema or rash. Neurological:      Mental Status: She is alert and oriented to person, place, and time.    Psychiatric:         Behavior: Behavior normal.         Diagnostic Study Results     Labs - Recent Results (from the past 12 hour(s))   CBC WITH AUTOMATED DIFF    Collection Time: 05/04/20  8:43 AM   Result Value Ref Range    WBC 10.7 3.6 - 11.0 K/uL    RBC 4.26 3.80 - 5.20 M/uL    HGB 14.0 11.5 - 16.0 g/dL    HCT 41.7 35.0 - 47.0 %    MCV 97.9 80.0 - 99.0 FL    MCH 32.9 26.0 - 34.0 PG    MCHC 33.6 30.0 - 36.5 g/dL    RDW 13.6 11.5 - 14.5 %    PLATELET 484 983 - 939 K/uL    MPV 9.1 8.9 - 12.9 FL    NRBC 0.0 0  WBC    ABSOLUTE NRBC 0.00 0.00 - 0.01 K/uL    NEUTROPHILS 80 (H) 32 - 75 %    LYMPHOCYTES 14 12 - 49 %    MONOCYTES 6 5 - 13 %    EOSINOPHILS 0 0 - 7 %    BASOPHILS 0 0 - 1 %    IMMATURE GRANULOCYTES 0 0.0 - 0.5 %    ABS. NEUTROPHILS 8.5 (H) 1.8 - 8.0 K/UL    ABS. LYMPHOCYTES 1.5 0.8 - 3.5 K/UL    ABS. MONOCYTES 0.6 0.0 - 1.0 K/UL    ABS. EOSINOPHILS 0.0 0.0 - 0.4 K/UL    ABS. BASOPHILS 0.0 0.0 - 0.1 K/UL    ABS. IMM.  GRANS. 0.0 0.00 - 0.04 K/UL    DF AUTOMATED     METABOLIC PANEL, BASIC    Collection Time: 05/04/20  8:43 AM   Result Value Ref Range    Sodium 138 136 - 145 mmol/L    Potassium 4.0 3.5 - 5.1 mmol/L    Chloride 105 97 - 108 mmol/L    CO2 27 21 - 32 mmol/L    Anion gap 6 5 - 15 mmol/L    Glucose 114 (H) 65 - 100 mg/dL    BUN 15 6 - 20 MG/DL    Creatinine 0.94 0.55 - 1.02 MG/DL    BUN/Creatinine ratio 16 12 - 20      GFR est AA >60 >60 ml/min/1.73m2    GFR est non-AA 59 (L) >60 ml/min/1.73m2    Calcium 8.9 8.5 - 10.1 MG/DL   EKG, 12 LEAD, INITIAL    Collection Time: 05/04/20  8:46 AM   Result Value Ref Range    Ventricular Rate 75 BPM    Atrial Rate 75 BPM    P-R Interval 142 ms    QRS Duration 82 ms    Q-T Interval 408 ms    QTC Calculation (Bezet) 455 ms    Calculated P Axis 54 degrees    Calculated R Axis 63 degrees    Calculated T Axis 56 degrees    Diagnosis       Normal sinus rhythm  Low voltage QRS  Possible Inferior infarct , age undetermined  When compared with ECG of 19-NOV-2001 13:48,  Previous ECG has undetermined rhythm, needs review         Radiologic Studies -   XR WRIST LT AP/LAT   Final Result   IMPRESSION:       Near-anatomic reduction of distal radial and ulnar comminuted fractures. .      XR WRIST LT AP/LAT/OBL MIN 3V   Final Result   IMPRESSION: Comminuted fractures of distal radius and ulna. CT HEAD WO CONT   Final Result   Impression:    1. No evidence of acute infarct or intracranial hemorrhage. 2. Periventricular white matter disease is likely secondary to chronic small   vessel ischemic changes. CT Results  (Last 48 hours)               05/04/20 0901  CT HEAD WO CONT Final result    Impression:  Impression:    1. No evidence of acute infarct or intracranial hemorrhage. 2. Periventricular white matter disease is likely secondary to chronic small   vessel ischemic changes. Narrative: Indication:  closed head injury        Comparison: None       Findings: 5 mm axial images were obtained from the skull base through the   vertex. CT dose reduction was achieved through the use of a standardized protocol   tailored for this examination and automatic exposure control for dose   modulation. The ventricles and cortical sulci are prominent, compatible with age related   volume loss. There is no evidence of intracranial hemorrhage, mass, mass effect,   or acute infarct. There is periventricular white matter disease. No extra-axial   fluid collections are seen. There are scattered paranasal sinus opacities in the   ethmoid air cells and right sphenoid sinus4. The orbital structures are   unremarkable. No osseous abnormalities are seen. CXR Results  (Last 48 hours)    None            Medical Decision Making   I am the first provider for this patient. I reviewed the vital signs, available nursing notes, past medical history, past surgical history, family history and social history. Vital Signs-Reviewed the patient's vital signs.   Patient Vitals for the past 12 hrs:   Temp Pulse Resp BP SpO2   05/04/20 1231  90 15 101/77 92 %   05/04/20 0845  84 13 (!) 161/104 97 %   05/04/20 0837 98.4 °F (36.9 °C) 80 18 157/90 97 %       Pulse Oximetry Analysis -97% on room air        Records Reviewed: Nursing Notes and Old Medical Records    Provider Notes (Medical Decision Making): Concern for fracture of the right left wrist.  Will obtain CT to rule out intracranial bleed. No other obvious injury seen, small abrasion to the left knee, but no imaging full range of motion of the left knee will obtain Ortho consultation to help with reduction of the left wrist.    ED Course:   Initial assessment performed. The patients presenting problems have been discussed, and they are in agreement with the care plan formulated and outlined with them. I have encouraged them to ask questions as they arise throughout their visit. ED Course as of May 04 1430   Mon May 04, 2020   0892 Orthopedics consulted, they will come in for reduction of the left wrist fracture    [AR]      ED Course User Index  [AR] Kay Spencer DO     Reexamination:  Left wrist: Now that patient's pain is more controlled able to move the left wrist and can see a laceration on the anterior aspect of the left wrist concerning for open fracture. Orthopedics aware. .         Critical Care Time:   none    Disposition:  DISCHARGE NOTE  Patients results have been reviewed with them. Patient and/or family have verbally conveyed their understanding and agreement of the patient's signs, symptoms, diagnosis, treatment and prognosis and additionally agree to follow up as recommended or return to the Emergency Room should their condition change or have any new concerns prior to their follow-up appointment. Patient verbally agrees with the care-plan and verbally conveys that all of their questions have been answered.    Discharge instructions have also been provided to the patient with some educational information regarding their diagnosis as well a list of reasons why they would want to return to the ER prior to their follow-up appointment should their condition change. PLAN:  1. Discharge Medication List as of 5/4/2020 11:22 AM      START taking these medications    Details   cephALEXin (Keflex) 500 mg capsule Take 1 Cap by mouth four (4) times daily for 7 days. , Normal, Disp-28 Cap, R-0      oxyCODONE IR (Roxicodone) 5 mg immediate release tablet Take 1 Tab by mouth every six (6) hours as needed for Pain for up to 3 days. Max Daily Amount: 20 mg., Normal, Disp-12 Tab, R-0      acetaminophen (TYLENOL) 500 mg capsule Take 2 Caps by mouth every six (6) hours as needed for Pain., Normal, Disp-20 Cap, R-0         CONTINUE these medications which have NOT CHANGED    Details   gabapentin (NEURONTIN) 100 mg capsule Take 1 Cap by mouth nightly. Max Daily Amount: 100 mg., Print, Disp-30 Cap, R-2      inFLIXimab (REMICADE) 100 mg injection by IntraVENous route every two (2) months., Historical Med      amLODIPine (NORVASC) 2.5 mg tablet TAKE 1 TABLET BY MOUTH ONCE A DAY FOR BLOOD PRESSURE, Historical Med      folic acid (FOLVITE) 1 mg tablet Take  by mouth daily. , Historical Med      lisinopril (PRINIVIL, ZESTRIL) 20 mg tablet Take  by mouth daily. , Historical Med      methotrexate (RHEUMATREX) 2.5 mg tablet Take 2.5 mg by mouth. Taking 6 tabs once every Tuesday, Historical Med      sertraline (ZOLOFT) 50 mg tablet Take 100 mg by mouth daily. Patient taking 3 tabs for depression , Historical Med      simvastatin (ZOCOR) 10 mg tablet Take  by mouth nightly., Historical Med      traZODone (DESYREL) 50 mg tablet Take  by mouth nightly., Historical Med      Arm Brace misc Bilateral carpal tunnel splint to be used every night, Print, Disp-2 Each, R-0           2.    Follow-up Information     Follow up With Specialties Details Why Contact Info    Hernan Ledesma MD Orthopedic Surgery Schedule an appointment as soon as possible for a visit  [address] [facility]  [address] [address]  [address] [address]  [phone]      [facility] EMERGENCY DEPT Emergency Medicine  If symptoms worsen [address] [place] [address]  [phone]          Return to ED if worse     Diagnosis     Clinical Impression:   1. Open fracture of left wrist, initial encounter        Attestations:   This note was completed by Wesley Hernandez DO

## 2020-05-04 NOTE — ED NOTES
Fara George from ortho at pt's bedside.
Sling applied, pt verbalizes understanding, wheeled out to waiting room
General: NAD, AAOx3  HEENT: NCAT, RENETTA, EOMI  Cardiac: RRR S1, S2  Respiratory: CTAB, normal respiratory effort  Abdomen: Soft, non-distended, RUQ tender  Musculoskeletal: Strength 5/5 BL UE/LE, ROM intact, compartments soft  Neuro: Sensation grossly intact and equal throughout, CN II-XII intact, no focal deficits  Vascular: Pulses 2+ throughout, extremities well perfused  Skin: Warm/dry, normal color, no jaundice  Incision/wound: healing well, dressings in place, clean, dry and intact

## 2020-05-04 NOTE — DISCHARGE INSTRUCTIONS
Patient Education        Broken Wrist: Care Instructions  Your Care Instructions    Your wrist can break, or fracture, during sports, a fall, or other accidents. The break may happen when your wrist is hit or is used to protect you in a fall. Fractures can range from a small, hairline crack, to a bone or bones broken into two or more pieces. Your treatment depends on how bad the break is. Your doctor may have put your wrist in a cast or splint. This will help keep your wrist stable until your follow-up appointment. It may take weeks or months for your wrist to heal. You can help it heal with care at home. You heal best when you take good care of yourself. Eat a variety of healthy foods, and don't smoke. Follow-up care is a key part of your treatment and safety. Be sure to make and go to all appointments, and call your doctor if you are having problems. It's also a good idea to know your test results and keep a list of the medicines you take. How can you care for yourself at home? · Put ice or a cold pack on your wrist for 10 to 20 minutes at a time. Try to do this every 1 to 2 hours for the next 3 days (when you are awake). Put a thin cloth between the ice and your cast or splint. Keep your cast or splint dry. · Follow the splint or cast care instructions your doctor gives you. If you have a splint, do not take it off unless your doctor tells you to. Be careful not to put the splint on too tight. · Be safe with medicines. Take pain medicines exactly as directed. ? If the doctor gave you a prescription medicine for pain, take it as prescribed. ? If you are not taking a prescription pain medicine, ask your doctor if you can take an over-the-counter medicine. · Prop up your wrist on pillows when you sit or lie down in the first few days after the injury. Keep your wrist higher than the level of your heart. This will help reduce swelling.   · Move your fingers often to reduce swelling and stiffness, but do not use that hand to grab or carry anything. · Follow instructions for exercises to keep your arm strong. When should you call for help? Call your doctor now or seek immediate medical care if:    · You have new or worse pain.     · Your hand or fingers are cool or pale or change color.     · Your cast or splint feels too tight.     · You have tingling, weakness, or numbness in your hand or fingers.    Watch closely for changes in your health, and be sure to contact your doctor if:    · You do not get better as expected.     · You have problems with your cast or splint. Where can you learn more? Go to http://cecelia-yolanda.info/  Enter J579 in the search box to learn more about \"Broken Wrist: Care Instructions. \"  Current as of: June 26, 2019Content Version: 12.4  © 0073-5155 [employer]. Care instructions adapted under license by "[employer] (which disclaims liability or warranty for this information). If you have questions about a medical condition or this instruction, always ask your healthcare professional. [address] any warranty or liability for your use of this information.

## 2020-05-05 LAB
ATRIAL RATE: 75 BPM
CALCULATED P AXIS, ECG09: 54 DEGREES
CALCULATED R AXIS, ECG10: 63 DEGREES
CALCULATED T AXIS, ECG11: 56 DEGREES
DIAGNOSIS, 93000: NORMAL
P-R INTERVAL, ECG05: 142 MS
Q-T INTERVAL, ECG07: 408 MS
QRS DURATION, ECG06: 82 MS
QTC CALCULATION (BEZET), ECG08: 455 MS
VENTRICULAR RATE, ECG03: 75 BPM

## 2020-05-07 NOTE — PERIOP NOTES
Called rheumatologist's office and spoke with nurse [facility]. No additional precautions or medications required for procedure. EKGs dated 6/2019 and 5/2020, stress echo, and cardiology notes reviewed by Amber Zee NP. Ok to proceed with procedure, patient has had no changes and told to f/u with cardio in 1 year.

## 2020-05-07 NOTE — PERIOP NOTES
[facility]  Preoperative Instructions        Surgery Date 5/8/20          Time of Arrival 1000    1. On the day of your surgery, please report to the Surgical Services Registration Desk and sign in at your designated time. The Surgery Center is located to the right of the Emergency Room. 2. You must have someone with you to drive you home. You should not drive a car for 24 hours following surgery. Please make arrangements for a friend or family member to stay with you for the first 24 hours after your surgery. 3. Do not have anything to eat or drink (including water, gum, mints, coffee, juice) after midnight ?5/7/20? Farzad Griffin ? This may not apply to medications prescribed by your physician. ?(Please note below the special instructions with medications to take the morning of your procedure.)    4. We recommend you do not drink any alcoholic beverages for 24 hours before and after your surgery. 5. Contact your surgeons office for instructions on the following medications: non-steroidal anti-inflammatory drugs (i.e. Advil, Aleve), vitamins, and supplements. (Some surgeons will want you to stop these medications prior to surgery and others may allow you to take them)  **If you are currently taking Plavix, Coumadin, Aspirin and/or other blood-thinning agents, contact your surgeon for instructions. ** Your surgeon will partner with the physician prescribing these medications to determine if it is safe to stop or if you need to continue taking. Please do not stop taking these medications without instructions from your surgeon    6. Wear comfortable clothes. Wear glasses instead of contacts. Do not bring any money or jewelry. Please bring picture ID, insurance card, and any prearranged co-payment or hospital payment. Do not wear make-up, particularly mascara the morning of your surgery. Do not wear nail polish, particularly if you are having foot /hand surgery.   Wear your hair loose or down, no ponytails, buns, hayder pins or clips. All body piercings must be removed. Please shower with antibacterial soap for three consecutive days before and on the morning of surgery, but do not apply any lotions, powders or deodorants after the shower on the day of surgery. Please use a fresh towels after each shower. Please sleep in clean clothes and change bed linens the night before surgery. Please do not shave for 48 hours prior to surgery. Shaving of the face is acceptable. 7. You should understand that if you do not follow these instructions your surgery may be cancelled. If your physical condition changes (I.e. fever, cold or flu) please contact your surgeon as soon as possible. 8. It is important that you be on time. If a situation occurs where you may be late, please call [phone] (OR Holding Area). 9. If you have any questions and or problems, please call [phone] (Pre-admission Testing). 10. Your surgery time may be subject to change. You will receive a phone call the evening prior if your time changes. 11.  If having outpatient surgery, you must have someone to drive you here, stay with you during the duration of your stay, and to drive you home at time of discharge. 12.   In an effort to improve the efficiency, privacy, and safety for all of our Pre-op patients visitors are not allowed in the Holding area. Once you arrive and are registered your family/visitors will be asked to remain in your vehicle. The Pre-op staff will call you when they are ready for you to enter the building and will explain to you and your family/visitors that the Pre-op phase is beginning. At this time, if your procedure is outpatient, your family member will be asked to stay in their vehicle until the surgery is complete and you are ready for discharge.  If you are going to be admitted after your surgery, once you are called to come inside the building, your family will be able to leave the parking lot. At this time the staff will also ask for your designated spokesperson information in the event that the physician or staff need to provide an update or obtain any pertinent information. The designated spokesperson will be notified if the physician needs to speak to family during the pre-operative phase.and/or in the post op phase. Special Instructions:     TAKE ALL MEDICATIONS DAY OF SURGERY EXCEPT:  lisinopril    I understand a pre-operative phone call will be made to verify my surgery time. In the event that I am not available, I give permission for a message to be left on my answering service and/or with another person?   yes         ___________________      __________   _________    (Signature of Patient)             (Witness)                (Date and Time)

## 2020-05-07 NOTE — H&P
This is a 69-year-old left hand dominant female who presents today with an injury to the left wrist.  This occurred on Sunday. Was seen at the emergency room the next day. Was diagnosed with a fracture and closed reduction was performed. She was placed in a splint. Has been splinted since. 9/10 pain. Of note does have a history of a left-sided stroke affecting that side however she does still have quite good function of that side prior to the fall.     Objective:   Constitutional:  No acute distress. Well nourished. Well developed. Eyes:  Sclera are nonicteric. Respiratory:  No labored breathing. Cardiovascular:  No marked edema. Skin:  No marked skin ulcers. Neurological:  see below  Psychiatric: Alert and oriented x3. Musculoskeletal   Examination of the left upper extremity demonstrates she is in a well-fitting sugar-tong splint. Does have digital swelling. Can wiggle all fingers tinge early. EPL intact. Neurovascular intact distally however does have baseline paresthesias from the stroke.     Radiographs:       X-ray Wrist Left 3+ Views ([id number])     Result Date: 5/7/2020  Sitting. PA, Lat, Oblique, Tilted Lat.      Impression: There is a primarily extra-articular distal radius fracture which is 100% dorsally displaced. Has a minimally displaced distal ulna metaphyseal fracture. Splint material in place.       Assessment:      1. Left wrist pain    2. Other closed extra-articular fracture of distal end of left radius, initial encounter         Plan:   Discussed with her that due to the significant dorsal displacement and her hand dominance and prior function of the extremity I do recommend surgical management. Risks benefits alternatives of open reduction internal fixation are discussed. She consents to proceed. Surgical date for tomorrow chosen.

## 2020-05-08 ENCOUNTER — ANESTHESIA EVENT (OUTPATIENT)
Dept: SURGERY | Age: 70
End: 2020-05-08
Payer: MEDICARE

## 2020-05-08 ENCOUNTER — ANESTHESIA (OUTPATIENT)
Dept: SURGERY | Age: 70
End: 2020-05-08
Payer: MEDICARE

## 2020-05-08 ENCOUNTER — HOSPITAL ENCOUNTER (OUTPATIENT)
Age: 70
Setting detail: OUTPATIENT SURGERY
Discharge: HOME | End: 2020-05-08
Attending: ORTHOPAEDIC SURGERY | Admitting: ORTHOPAEDIC SURGERY
Payer: MEDICARE

## 2020-05-08 VITALS
HEART RATE: 85 BPM | SYSTOLIC BLOOD PRESSURE: 119 MMHG | DIASTOLIC BLOOD PRESSURE: 65 MMHG | TEMPERATURE: 98.2 F | BODY MASS INDEX: 20.94 KG/M2 | WEIGHT: 118.17 LBS | RESPIRATION RATE: 16 BRPM | HEIGHT: 63 IN | OXYGEN SATURATION: 97 %

## 2020-05-08 DIAGNOSIS — S62.102B OPEN FRACTURE OF LEFT WRIST, INITIAL ENCOUNTER: Primary | ICD-10-CM

## 2020-05-08 PROCEDURE — 77030018836 HC SOL IRR NACL ICUM -A: Performed by: ORTHOPAEDIC SURGERY

## 2020-05-08 PROCEDURE — 77030040356 HC CORD BPLR FRCP COVD -A: Performed by: ORTHOPAEDIC SURGERY

## 2020-05-08 PROCEDURE — A4565 SLINGS: HCPCS

## 2020-05-08 PROCEDURE — C1713 ANCHOR/SCREW BN/BN,TIS/BN: HCPCS | Performed by: ORTHOPAEDIC SURGERY

## 2020-05-08 PROCEDURE — 76010000161 HC OR TIME 1 TO 1.5 HR INTENSV-TIER 1: Performed by: ORTHOPAEDIC SURGERY

## 2020-05-08 PROCEDURE — 76210000020 HC REC RM PH II FIRST 0.5 HR: Performed by: ORTHOPAEDIC SURGERY

## 2020-05-08 PROCEDURE — 77030035361 HC BIT DRL SLD PA GEMNS SKEL -B: Performed by: ORTHOPAEDIC SURGERY

## 2020-05-08 PROCEDURE — 77030002916 HC SUT ETHLN J&J -A: Performed by: ORTHOPAEDIC SURGERY

## 2020-05-08 PROCEDURE — 74011250636 HC RX REV CODE- 250/636: Performed by: ANESTHESIOLOGY

## 2020-05-08 PROCEDURE — 76060000033 HC ANESTHESIA 1 TO 1.5 HR: Performed by: ORTHOPAEDIC SURGERY

## 2020-05-08 PROCEDURE — 74011000250 HC RX REV CODE- 250: Performed by: NURSE ANESTHETIST, CERTIFIED REGISTERED

## 2020-05-08 PROCEDURE — 74011250637 HC RX REV CODE- 250/637: Performed by: ORTHOPAEDIC SURGERY

## 2020-05-08 PROCEDURE — 77030040922 HC BLNKT HYPOTHRM STRY -A

## 2020-05-08 PROCEDURE — 77030037933 HC DRV DISP SKEL -B: Performed by: ORTHOPAEDIC SURGERY

## 2020-05-08 PROCEDURE — 74011250636 HC RX REV CODE- 250/636: Performed by: ORTHOPAEDIC SURGERY

## 2020-05-08 PROCEDURE — 77030040361 HC SLV COMPR DVT MDII -B: Performed by: ORTHOPAEDIC SURGERY

## 2020-05-08 PROCEDURE — 77030035360 HC BIT DRL SKEL -B: Performed by: ORTHOPAEDIC SURGERY

## 2020-05-08 PROCEDURE — 74011250637 HC RX REV CODE- 250/637: Performed by: ANESTHESIOLOGY

## 2020-05-08 PROCEDURE — 77030038840 HC GD AIM SKEL -B: Performed by: ORTHOPAEDIC SURGERY

## 2020-05-08 PROCEDURE — 76210000063 HC OR PH I REC FIRST 0.5 HR: Performed by: ORTHOPAEDIC SURGERY

## 2020-05-08 PROCEDURE — 74011250636 HC RX REV CODE- 250/636: Performed by: NURSE ANESTHETIST, CERTIFIED REGISTERED

## 2020-05-08 PROCEDURE — 74011000250 HC RX REV CODE- 250: Performed by: ORTHOPAEDIC SURGERY

## 2020-05-08 PROCEDURE — 77030013837 HC NERV BLK KT BBMI -B: Performed by: ANESTHESIOLOGY

## 2020-05-08 PROCEDURE — 77030000032 HC CUF TRNQT ZIMM -B: Performed by: ORTHOPAEDIC SURGERY

## 2020-05-08 DEVICE — PEG LCK THRD 2.3X16MM -- GEMINUS: Type: IMPLANTABLE DEVICE | Site: WRIST | Status: FUNCTIONAL

## 2020-05-08 DEVICE — PLATE BNE 3 H L DST VOLAR RAD TI NAR GEMINUS: Type: IMPLANTABLE DEVICE | Site: WRIST | Status: FUNCTIONAL

## 2020-05-08 DEVICE — SCR CORT LCK 3.5X11MM --: Type: IMPLANTABLE DEVICE | Site: WRIST | Status: FUNCTIONAL

## 2020-05-08 DEVICE — SCREW BNE L12MM DIA3.5MM CORT DST VOLAR RAD TI LOK FULL: Type: IMPLANTABLE DEVICE | Site: WRIST | Status: FUNCTIONAL

## 2020-05-08 DEVICE — SCR CORT NLCK 3.5X12MM -- GEMINUS: Type: IMPLANTABLE DEVICE | Site: WRIST | Status: FUNCTIONAL

## 2020-05-08 DEVICE — PEG LCK THRD 2.3X18MM -- GEMINUS: Type: IMPLANTABLE DEVICE | Site: WRIST | Status: FUNCTIONAL

## 2020-05-08 RX ORDER — FENTANYL CITRATE 50 UG/ML
INJECTION, SOLUTION INTRAMUSCULAR; INTRAVENOUS AS NEEDED
Status: DISCONTINUED | OUTPATIENT
Start: 2020-05-08 | End: 2020-05-08 | Stop reason: HOSPADM

## 2020-05-08 RX ORDER — LIDOCAINE HYDROCHLORIDE 10 MG/ML
0.1 INJECTION, SOLUTION EPIDURAL; INFILTRATION; INTRACAUDAL; PERINEURAL AS NEEDED
Status: DISCONTINUED | OUTPATIENT
Start: 2020-05-08 | End: 2020-05-08 | Stop reason: HOSPADM

## 2020-05-08 RX ORDER — ONDANSETRON 2 MG/ML
4 INJECTION INTRAMUSCULAR; INTRAVENOUS AS NEEDED
Status: DISCONTINUED | OUTPATIENT
Start: 2020-05-08 | End: 2020-05-08 | Stop reason: HOSPADM

## 2020-05-08 RX ORDER — FENTANYL CITRATE 50 UG/ML
25 INJECTION, SOLUTION INTRAMUSCULAR; INTRAVENOUS
Status: DISCONTINUED | OUTPATIENT
Start: 2020-05-08 | End: 2020-05-08 | Stop reason: HOSPADM

## 2020-05-08 RX ORDER — SODIUM CHLORIDE, SODIUM LACTATE, POTASSIUM CHLORIDE, CALCIUM CHLORIDE 600; 310; 30; 20 MG/100ML; MG/100ML; MG/100ML; MG/100ML
25 INJECTION, SOLUTION INTRAVENOUS CONTINUOUS
Status: DISCONTINUED | OUTPATIENT
Start: 2020-05-08 | End: 2020-05-08 | Stop reason: HOSPADM

## 2020-05-08 RX ORDER — ROPIVACAINE HYDROCHLORIDE 5 MG/ML
INJECTION, SOLUTION EPIDURAL; INFILTRATION; PERINEURAL AS NEEDED
Status: DISCONTINUED | OUTPATIENT
Start: 2020-05-08 | End: 2020-05-08 | Stop reason: HOSPADM

## 2020-05-08 RX ORDER — PROPOFOL 10 MG/ML
INJECTION, EMULSION INTRAVENOUS
Status: DISCONTINUED | OUTPATIENT
Start: 2020-05-08 | End: 2020-05-08 | Stop reason: HOSPADM

## 2020-05-08 RX ORDER — FENTANYL CITRATE 50 UG/ML
50 INJECTION, SOLUTION INTRAMUSCULAR; INTRAVENOUS AS NEEDED
Status: DISCONTINUED | OUTPATIENT
Start: 2020-05-08 | End: 2020-05-08 | Stop reason: HOSPADM

## 2020-05-08 RX ORDER — PROPOFOL 10 MG/ML
INJECTION, EMULSION INTRAVENOUS AS NEEDED
Status: DISCONTINUED | OUTPATIENT
Start: 2020-05-08 | End: 2020-05-08 | Stop reason: HOSPADM

## 2020-05-08 RX ORDER — LIDOCAINE HYDROCHLORIDE 20 MG/ML
INJECTION, SOLUTION EPIDURAL; INFILTRATION; INTRACAUDAL; PERINEURAL AS NEEDED
Status: DISCONTINUED | OUTPATIENT
Start: 2020-05-08 | End: 2020-05-08 | Stop reason: HOSPADM

## 2020-05-08 RX ORDER — OXYCODONE HYDROCHLORIDE 5 MG/1
5-10 TABLET ORAL
Qty: 40 TAB | Refills: 0 | Status: SHIPPED | OUTPATIENT
Start: 2020-05-08 | End: 2020-05-22

## 2020-05-08 RX ORDER — MORPHINE SULFATE 10 MG/ML
2 INJECTION, SOLUTION INTRAMUSCULAR; INTRAVENOUS
Status: DISCONTINUED | OUTPATIENT
Start: 2020-05-08 | End: 2020-05-08 | Stop reason: HOSPADM

## 2020-05-08 RX ORDER — OXYCODONE HYDROCHLORIDE 5 MG/1
5-10 TABLET ORAL
Status: DISCONTINUED | OUTPATIENT
Start: 2020-05-08 | End: 2020-05-08 | Stop reason: HOSPADM

## 2020-05-08 RX ORDER — MIDAZOLAM HYDROCHLORIDE 1 MG/ML
INJECTION, SOLUTION INTRAMUSCULAR; INTRAVENOUS AS NEEDED
Status: DISCONTINUED | OUTPATIENT
Start: 2020-05-08 | End: 2020-05-08 | Stop reason: HOSPADM

## 2020-05-08 RX ORDER — MIDAZOLAM HYDROCHLORIDE 1 MG/ML
1 INJECTION, SOLUTION INTRAMUSCULAR; INTRAVENOUS AS NEEDED
Status: DISCONTINUED | OUTPATIENT
Start: 2020-05-08 | End: 2020-05-08 | Stop reason: HOSPADM

## 2020-05-08 RX ORDER — OXYCODONE HYDROCHLORIDE 5 MG/1
TABLET ORAL
Status: DISCONTINUED
Start: 2020-05-08 | End: 2020-05-08 | Stop reason: HOSPADM

## 2020-05-08 RX ADMIN — Medication 3 AMPULE: at 11:32

## 2020-05-08 RX ADMIN — PROPOFOL 20 MG: 10 INJECTION, EMULSION INTRAVENOUS at 12:54

## 2020-05-08 RX ADMIN — PROPOFOL 20 MG: 10 INJECTION, EMULSION INTRAVENOUS at 12:08

## 2020-05-08 RX ADMIN — LIDOCAINE HYDROCHLORIDE 20 MG: 20 INJECTION, SOLUTION EPIDURAL; INFILTRATION; INTRACAUDAL; PERINEURAL at 12:08

## 2020-05-08 RX ADMIN — FENTANYL CITRATE 50 MCG: 50 INJECTION, SOLUTION INTRAMUSCULAR; INTRAVENOUS at 13:15

## 2020-05-08 RX ADMIN — PROPOFOL 50 MCG/KG/MIN: 10 INJECTION, EMULSION INTRAVENOUS at 12:08

## 2020-05-08 RX ADMIN — PHENYLEPHRINE HYDROCHLORIDE 20 MCG/MIN: 10 INJECTION INTRAVENOUS at 12:22

## 2020-05-08 RX ADMIN — ROPIVACAINE HYDROCHLORIDE 25 ML: 5 INJECTION, SOLUTION EPIDURAL; INFILTRATION; PERINEURAL at 11:19

## 2020-05-08 RX ADMIN — MIDAZOLAM HYDROCHLORIDE 2 MG: 1 INJECTION, SOLUTION INTRAMUSCULAR; INTRAVENOUS at 11:16

## 2020-05-08 RX ADMIN — FENTANYL CITRATE 50 MCG: 50 INJECTION, SOLUTION INTRAMUSCULAR; INTRAVENOUS at 13:19

## 2020-05-08 RX ADMIN — SODIUM CHLORIDE, SODIUM LACTATE, POTASSIUM CHLORIDE, AND CALCIUM CHLORIDE 25 ML/HR: 600; 310; 30; 20 INJECTION, SOLUTION INTRAVENOUS at 11:14

## 2020-05-08 RX ADMIN — PROPOFOL 10 MG: 10 INJECTION, EMULSION INTRAVENOUS at 13:02

## 2020-05-08 RX ADMIN — WATER 2 G: 1 INJECTION INTRAMUSCULAR; INTRAVENOUS; SUBCUTANEOUS at 12:10

## 2020-05-08 RX ADMIN — PROPOFOL 10 MG: 10 INJECTION, EMULSION INTRAVENOUS at 12:22

## 2020-05-08 RX ADMIN — PROPOFOL 20 MG: 10 INJECTION, EMULSION INTRAVENOUS at 12:32

## 2020-05-08 NOTE — PERIOP NOTES
Handoff Report from Operating Room to PACU    Report received from [employer]  and Marshall Covington CRNA regarding [facility]. Surgeon(s):  Prince Asa MD  And Procedure(s) (LRB):  LEFT DISTAL RADIUS OPEN REDUCTION INTERNAL FIXATION (REGIONAL BLOCK WITH MAC) (Left)  confirmed   with dressings discussed. Anesthesia type, drugs, patient history, complications, estimated blood loss, vital signs, intake and output, and last pain medication were reviewed.

## 2020-05-08 NOTE — PERIOP NOTES
Pt posted for surgery on 5-7-2020 . No covid testing done pt has denied any exposure to covid affected  people or symptoms and has not  Experienced any sympstoms  her self. Vss. Pt states she lives alone and has been  In  Her  Own house since staying in [facility] in [facility].

## 2020-05-08 NOTE — DISCHARGE INSTRUCTIONS
[facility]  Post-operative instructions  For: [address] [place] [address] first postop appointment should be scheduled with Dr. Russ Henriquez for 2 weeks post-op. [address], [address]  [place]   Phone: [phone]  Hand Therapy Phone: [phone]  Fax: [phone]    Please follow these instructions for a safe and speedy recovery:    1. Surgical Bandage: Leave the bandage in place until we remove it. Please keep it clean and dry. To shower or bathe, apply a plastic bag or GLAD Press'n Seal® plastic wrap around the bandage or simply sponge bathe. 2. Elevation: Hand swelling is best prevented by keeping your hand elevated above the level of your heart at all times, night and day. The opposite, dangling your hand below your waist, will cause additional pain, swelling, and later stiffness. You can elevate the hand in a sling or by propping it on a pillow at night. Occasionally, we will provide you with a custom-made foam block for elevating the arm. Ice compresses may help but do not rep[lace elevation. Frequently, extreme pain is caused by a tight bandage, which should be loosened. If pain is severe and progressive, call us at [phone] during the day (ask for immediate connection to Dr. Alina Ybarra Team) or during the night (ask for the on-call physician). 3. Medication: You will be provided with an appropriate pain medication (over-the-counter or prescription). Please fill this at a pharmacy promptly so you will have it available when all local anesthetic wears off. Take this to relieve pain as directed on the bottle. Please refrain from driving, drinking alcohol, and making important medical decisions while taking the medication. Please call us if you need something stronger. Medication changes or refills must be made before 5pm or through your pharmacy.     4. Weight bearing: Do NOT bear any weight on the operative extremity. I want to thank you for choosing us for your hand care needs. My staff and I are committed to providing all our customers with the highest quality hand surgery and subsequent hand therapy care as possible. We want your recovery to be comfortable. If you have clinical non-emergent questions about your surgery or other hand conditions please call [phone] and ask for my team. Your call will be returned within 24 hours. DISCHARGE SUMMARY from Nurse    PATIENT INSTRUCTIONS:    After general anesthesia or intravenous sedation, for 24 hours or while taking prescription Narcotics:  · Limit your activities  · Do not drive and operate hazardous machinery  · Do not make important personal or business decisions  · Do  not drink alcoholic beverages  · If you have not urinated within 8 hours after discharge, please contact your surgeon on call. Report the following to your surgeon:  · Excessive pain, swelling, redness or odor of or around the surgical area  · Temperature over 100.5  · Nausea and vomiting lasting longer than 4 hours or if unable to take medications  · Any signs of decreased circulation or nerve impairment to extremity: change in color, persistent  numbness, tingling, coldness or increase pain  · Any questions      These are general instructions for a healthy lifestyle:    No smoking/ No tobacco products/ Avoid exposure to second hand smoke  Surgeon General's Warning:  Quitting smoking now greatly reduces serious risk to your health.     Obesity, smoking, and sedentary lifestyle greatly increases your risk for illness    A healthy diet, regular physical exercise & weight monitoring are important for maintaining a healthy lifestyle    You may be retaining fluid if you have a history of heart failure or if you experience any of the following symptoms:  Weight gain of 3 pounds or more overnight or 5 pounds in a week, increased swelling in our hands or feet or shortness of breath while lying flat in bed. Please call your doctor as soon as you notice any of these symptoms; do not wait until your next office visit. The discharge information has been reviewed with the patient and friend. The patient and friend verbalized understanding. Discharge medications reviewed with the patient and friend and appropriate educational materials and side effects teaching were provided. Patient Education      Oxycodone, Rapid Release (ETH-Oxydose, Oxy IR, Roxicodone) - (By mouth)   Why this medicine is used:   Treats moderate to severe pain. This medicine is a narcotic pain reliever. Contact a nurse or doctor right away if you have:  · Fast or slow heart beat, shallow breathing, blue lips, skin or fingernails  · Anxiety, restlessness, fever, sweating, muscle spasms, twitching, seeing or hearing things that are not there  · Extreme weakness, shallow breathing, slow heartbeat  · Severe confusion, lightheadedness, dizziness, fainting  · Sweating or cold, clammy skin, seizures  · Severe constipation, stomach pain, nausea, vomiting     Common side effects:  · Mild constipation  · Sleepiness, tiredness  © 2017 [facility] Information is for End User's use only and may not be sold, redistributed or otherwise used for commercial purposes.        ___________________________________________________________________________________________________________________________________

## 2020-05-08 NOTE — OP NOTES
PATIENT NAME:  Chhaya Oconnor     SURGEON:    Norma Drummond MD     DATE OF SURGERY: 5/8/2020      LOCATION: [facility]      PREOPERATIVE DIAGNOSIS: Displaced left comminuted distal radius fracture     POSTOPERATIVE DIAGNOSIS:  Same     PROCEDURE:  ORIF left distal radius fracture     IMPLANTS:  Biomet DVR Crosslink volar plate and screws     ANESTHESIA: Brachial Plexus block/IV sedation      BLOOD LOSS:  Minimal     COMPLICATIONS: none      TOURNIQUET TIME: 35 min      OPERATIVE INDICATIONS:They had sustained a distal radius fracture with alignment that was unacceptable for non operative management. They therefore indicated for surgery. After risks benefits alternatives were discussed with the patient they consented to proceed.     DESCRIPTION  OF PROCEDURE:   On the date of operation the patient presented stable to the holding area. The correct upper extremity was identified and marked. Regional anesthesia was induced by the anesthesia team.  They were then brought to the operating room and placed supine with the operative upper extremity on a hand table and a nonsterile upper arm tourniquet placed. The upper extremity was then prepped and draped in the standard sterile fashion. After formal time-out was performed the upper extremity was elevated and exsanguinated and the upper arm tourniquet was inflated to 250 mm of mercury.     A standard volar Jesusita Washington approach was then taken to the distal radius. Skin and subcutaneous tissue were taken down sharply and the FCR was visualized. The FCR sheath was then carefully incised and this was retracted ulnarly. Fascia overlying the flexor pollicis was then carefully incised and this was also retracted ulnarly. The pronator quadratus was then visualized and this was stripped subperiosteally off bone. The fracture sites were then carefully visualized. Severe comminution was noted. Fracture sites were debrided. .  A brachioradialis tenotomy was then performed with care to avoid any damage to the 1st dorsal compartment. Fracture sites were then reduced and a 0.62 K-wire was placed through the radial styloid to hold preliminary reduction. The volar plate was then applied to the volar surface of the distal radius. K-wires were used to secure this and fluoroscopic imaging demonstrated a good position. A 1st cortical screw was then placed in the shaft of the radius. We then moved distally to the distal locking screws. These were placed in a locking fashion with fine tuning of the reduction distally as possible. Once all distal interlocking screws were placed the final cortical shaft screw was placed. X-ray images demonstrated good alignment of the fracture and appropriate hardware placement. The K-wire was removed. The distal ulna was assessed on x-rays and due to acceptable alignment and severe swelling, this was left in situ. Final x-rays were obtained. Tourniquet was then taken down. Copious irrigation was performed and all bleeding sites were carefully coagulated with bipolar cautery. Skin was closed with 3 0 nylon in a interrupted horizontal mattress fashion. Patient was then placed in sterile bulky dressing followed by a sugartong splint.   They were then taken stable to the recovery room with all instrument, needle and lap counts correct at the end of the case.

## 2020-05-08 NOTE — ANESTHESIA PROCEDURE NOTES
Peripheral Block    Start time: 5/8/2020 11:15 AM  End time: 5/8/2020 11:19 AM  Performed by: Evan Smith MD  Authorized by: Evan Smith MD       Pre-procedure:    Indications: at surgeon's request and post-op pain management    Preanesthetic Checklist: patient identified, risks and benefits discussed, site marked, timeout performed, anesthesia consent given and patient being monitored      Block Type:   Block Type:  Supraclavicular  Laterality:  Left  Monitoring:  Standard ASA monitoring, frequent vital sign checks, responsive to questions and oxygen  Injection Technique:  Single shot  Procedures: ultrasound guided    Prep: chlorhexidine    Location:  Supraclavicular  Needle Type:  Stimuplex  Needle Gauge:  22 G  Needle Localization:  Ultrasound guidance    Assessment:  Number of attempts:  1  Injection Assessment:  Incremental injection every 5 mL, local visualized surrounding nerve on ultrasound, negative aspiration for blood, ultrasound image on chart, no intravascular symptoms and no paresthesia  Patient tolerance:  Patient tolerated the procedure well with no immediate complications

## 2020-05-08 NOTE — ANESTHESIA POSTPROCEDURE EVALUATION
Procedure(s):  LEFT DISTAL RADIUS OPEN REDUCTION INTERNAL FIXATION (REGIONAL BLOCK WITH MAC). total IV anesthesia, regional    Anesthesia Post Evaluation        Patient location during evaluation: PACU  Note status: Adequate. Level of consciousness: responsive to verbal stimuli and sleepy but conscious  Pain management: satisfactory to patient  Airway patency: patent  Anesthetic complications: no  Cardiovascular status: acceptable  Respiratory status: acceptable  Hydration status: acceptable  Comments: +Post-Anesthesia Evaluation and Assessment    Patient: Joey Raphael MRN: [id number]  SSN: [id number]   YOB: 1950  Age: 69 y.o. Sex: female      Cardiovascular Function/Vital Signs    /59   Pulse 88   Temp 36.9 °C (98.4 °F)   Resp 13   Ht 5' 3\" (1.6 m)   Wt 53.6 kg (118 lb 2.7 oz)   SpO2 95%   BMI 20.93 kg/m²     Patient is status post Procedure(s):  LEFT DISTAL RADIUS OPEN REDUCTION INTERNAL FIXATION (REGIONAL BLOCK WITH MAC). Nausea/Vomiting: Controlled. Postoperative hydration reviewed and adequate. Pain:  Pain Scale 1: Numeric (0 - 10) (05/08/20 1335)  Pain Intensity 1: 0(no pain in left arm) (05/08/20 1335)   Managed. Neurological Status:   Neuro (WDL): Exceptions to WDL (05/08/20 1317)   At baseline. Mental Status and Level of Consciousness: Arousable. Pulmonary Status:   O2 Device: Room air (05/08/20 1335)   Adequate oxygenation and airway patent. Complications related to anesthesia: None    Post-anesthesia assessment completed. No concerns. Signed By: Samantha Hooper MD    5/8/2020  Post anesthesia nausea and vomiting:  controlled      Vitals Value Taken Time   /59 5/8/2020  1:35 PM   Temp 36.9 °C (98.4 °F) 5/8/2020  1:35 PM   Pulse 90 5/8/2020  1:44 PM   Resp 17 5/8/2020  1:44 PM   SpO2 94 % 5/8/2020  1:44 PM   Vitals shown include unvalidated device data.

## 2020-05-08 NOTE — ANESTHESIA PREPROCEDURE EVALUATION
Relevant Problems   No relevant active problems       Anesthetic History   No history of anesthetic complications            Review of Systems / Medical History  Patient summary reviewed, nursing notes reviewed and pertinent labs reviewed    Pulmonary  Within defined limits                 Neuro/Psych       CVA (LUE weakness and pain)  TIA     Cardiovascular    Hypertension              Exercise tolerance: >4 METS     GI/Hepatic/Renal  Within defined limits              Endo/Other        Arthritis     Other Findings   Comments: Chronic pain         Physical Exam    Airway  Mallampati: II  TM Distance: 4 - 6 cm  Neck ROM: normal range of motion   Mouth opening: Normal     Cardiovascular  Regular rate and rhythm,  S1 and S2 normal,  no murmur, click, rub, or gallop             Dental  No notable dental hx       Pulmonary  Breath sounds clear to auscultation               Abdominal  GI exam deferred       Other Findings            Anesthetic Plan    ASA: 3  Anesthesia type: total IV anesthesia and regional - supraclavicular block          Induction: Intravenous  Anesthetic plan and risks discussed with: Patient

## 2020-05-08 NOTE — PERIOP NOTES
Pt with outburst of crying and then being calm, c/o chronic pain not being managed well, wanted Tylenol # 4, says roxicodone does not work for her chronic left sided pain, pt says no c/o pain in left arm, still numb from the block, VSS, explained she should discuss this with her PCP or Neurologist handling or managing her chronic pain, discussed this with Dr. Lincoln Rodriguez and my Nursing managers Lesley Foster and Donavon Quinonez, agrees with pt being discharged and discuss chronic pain management with her Neurologist, offered roxicodone as prescribed by Dr. Lavona Schaumann , pt agreed but then changed her mind when brought to bedside and about to take pain pill, drug wasted, pt now calm, awaiting Cassi Plan her friend to pick her up, discharge instructions discussed with Yael Vasquez , her friend.

## 2020-05-09 ENCOUNTER — HOSPITAL ENCOUNTER (EMERGENCY)
Age: 70
Discharge: HOME | End: 2020-05-09
Attending: EMERGENCY MEDICINE
Payer: MEDICARE

## 2020-05-09 VITALS
TEMPERATURE: 98.9 F | RESPIRATION RATE: 16 BRPM | BODY MASS INDEX: 20.94 KG/M2 | HEART RATE: 108 BPM | OXYGEN SATURATION: 98 % | WEIGHT: 118.17 LBS | DIASTOLIC BLOOD PRESSURE: 80 MMHG | SYSTOLIC BLOOD PRESSURE: 126 MMHG | HEIGHT: 63 IN

## 2020-05-09 DIAGNOSIS — M25.532 LEFT WRIST PAIN: ICD-10-CM

## 2020-05-09 DIAGNOSIS — F41.1 ANXIETY STATE: ICD-10-CM

## 2020-05-09 DIAGNOSIS — G89.18 PAIN ASSOCIATED WITH SURGICAL PROCEDURE: Primary | ICD-10-CM

## 2020-05-09 DIAGNOSIS — S62.102B OPEN FRACTURE OF LEFT WRIST, INITIAL ENCOUNTER: ICD-10-CM

## 2020-05-09 PROCEDURE — 74011250636 HC RX REV CODE- 250/636: Performed by: PHYSICIAN ASSISTANT

## 2020-05-09 PROCEDURE — 99283 EMERGENCY DEPT VISIT LOW MDM: CPT

## 2020-05-09 PROCEDURE — 74011250637 HC RX REV CODE- 250/637: Performed by: PHYSICIAN ASSISTANT

## 2020-05-09 PROCEDURE — 96372 THER/PROPH/DIAG INJ SC/IM: CPT

## 2020-05-09 RX ORDER — LORAZEPAM 1 MG/1
1 TABLET ORAL
Status: COMPLETED | OUTPATIENT
Start: 2020-05-09 | End: 2020-05-09

## 2020-05-09 RX ORDER — LORAZEPAM 1 MG/1
1 TABLET ORAL
Qty: 15 TAB | Refills: 0 | Status: SHIPPED | OUTPATIENT
Start: 2020-05-09 | End: 2020-05-14

## 2020-05-09 RX ORDER — NALOXONE HYDROCHLORIDE 4 MG/.1ML
SPRAY NASAL
Qty: 2 EACH | Refills: 0 | Status: SHIPPED | OUTPATIENT
Start: 2020-05-09

## 2020-05-09 RX ORDER — KETOROLAC TROMETHAMINE 30 MG/ML
30 INJECTION, SOLUTION INTRAMUSCULAR; INTRAVENOUS
Status: COMPLETED | OUTPATIENT
Start: 2020-05-09 | End: 2020-05-09

## 2020-05-09 RX ADMIN — LORAZEPAM 1 MG: 1 TABLET ORAL at 18:13

## 2020-05-09 RX ADMIN — KETOROLAC TROMETHAMINE 30 MG: 30 INJECTION, SOLUTION INTRAMUSCULAR at 17:37

## 2020-05-09 NOTE — ED NOTES
Discharge summary and prescriptions reviewed with patient. Verbalized understanding. All questions welcomed and answered. Assisted off unit in wheelchair. Being transported home in private vehicle by friend.

## 2020-05-09 NOTE — DISCHARGE INSTRUCTIONS
Patient Education        Broken Wrist: Care Instructions  Your Care Instructions    Your wrist can break, or fracture, during sports, a fall, or other accidents. The break may happen when your wrist is hit or is used to protect you in a fall. Fractures can range from a small, hairline crack, to a bone or bones broken into two or more pieces. Your treatment depends on how bad the break is. Your doctor may have put your wrist in a cast or splint. This will help keep your wrist stable until your follow-up appointment. It may take weeks or months for your wrist to heal. You can help it heal with care at home. You heal best when you take good care of yourself. Eat a variety of healthy foods, and don't smoke. Follow-up care is a key part of your treatment and safety. Be sure to make and go to all appointments, and call your doctor if you are having problems. It's also a good idea to know your test results and keep a list of the medicines you take. How can you care for yourself at home? · Put ice or a cold pack on your wrist for 10 to 20 minutes at a time. Try to do this every 1 to 2 hours for the next 3 days (when you are awake). Put a thin cloth between the ice and your cast or splint. Keep your cast or splint dry. · Follow the splint or cast care instructions your doctor gives you. If you have a splint, do not take it off unless your doctor tells you to. Be careful not to put the splint on too tight. · Be safe with medicines. Take pain medicines exactly as directed. ? If the doctor gave you a prescription medicine for pain, take it as prescribed. ? If you are not taking a prescription pain medicine, ask your doctor if you can take an over-the-counter medicine. · Prop up your wrist on pillows when you sit or lie down in the first few days after the injury. Keep your wrist higher than the level of your heart. This will help reduce swelling.   · Move your fingers often to reduce swelling and stiffness, but do not use that hand to grab or carry anything. · Follow instructions for exercises to keep your arm strong. When should you call for help? Call your doctor now or seek immediate medical care if:    · You have new or worse pain.     · Your hand or fingers are cool or pale or change color.     · Your cast or splint feels too tight.     · You have tingling, weakness, or numbness in your hand or fingers.    Watch closely for changes in your health, and be sure to contact your doctor if:    · You do not get better as expected.     · You have problems with your cast or splint. Where can you learn more? Go to http://cecelia-yolanda.info/  Enter J579 in the search box to learn more about \"Broken Wrist: Care Instructions. \"  Current as of: June 26, 2019Content Version: 12.4  © 5916-4164 [employer]. Care instructions adapted under license by [employer] (which disclaims liability or warranty for this information). If you have questions about a medical condition or this instruction, always ask your healthcare professional. [address] any warranty or liability for your use of this information.

## 2020-05-09 NOTE — ED PROVIDER NOTES
EMERGENCY DEPARTMENT HISTORY AND PHYSICAL EXAM      Date: 5/9/2020  Patient Name: Estelle Zapata    History of Presenting Illness     Chief Complaint   Patient presents with    Fall     fall last sunday she tripped lost her balance fell backwards onto her head with pain all the way down her left side;     Wrist Pain     surgery yesterday to the left wrist-given oxycodone and gabapentin which are not touching her pain.  Anxiety     pt crying in triage; says she can't be discharged to be home alone. History Provided By: Patient    HPI: Estelle Zapata, 69 y.o. female with significant PMHx for HTN, hyperlipidema, and CVA in January 2019 with chronic left side neuropathic pain, presents by POV to the ED with cc of increased left wrist pain. The patient was fell this past Sunday, April 3rd and presented to the ED on Monday, April 4th for wrist pain. She was diagnosed with an open fracture of the left wrist and subsequently had surgical repair yesterday by Dr. William Montenegro. She presents to the ED today due to intractible pain noting the the percocet and neurontin that she is taking at home is not helping with her pain. She most recently took both medications 2 hours PTA. The patient reports that she has not been elevating and icing the extremity since discharge from the hospital. The patient denies fall/injury since her surgery yesterday. The pain is a constant, non-radiating pain. The patient is left hand dominate. There are no other complaints, changes, or physical findings at this time. Social Hx: Tobacco (1/2 ppd; reports that she was a former smoker and started smoking again about 2 weeks ago), EtOH (denies), Illicit drug use (denies)     PCP: Genna Montalvo MD    No current facility-administered medications on file prior to encounter.       Current Outpatient Medications on File Prior to Encounter   Medication Sig Dispense Refill    oxyCODONE IR (ROXICODONE) 5 mg immediate release tablet Take 1-2 Tabs by mouth every four (4) hours as needed for Pain for up to 14 days. Max Daily Amount: 60 mg. 40 Tab 0    cephALEXin (Keflex) 500 mg capsule Take 1 Cap by mouth four (4) times daily for 7 days. 28 Cap 0    gabapentin (NEURONTIN) 100 mg capsule Take 1 Cap by mouth nightly. Max Daily Amount: 100 mg. (Patient taking differently: Take 200 mg by mouth four (4) times daily.) 30 Cap 2    Arm Brace misc Bilateral carpal tunnel splint to be used every night 2 Each 0    inFLIXimab (REMICADE) 100 mg injection by IntraVENous route every two (2) months.  amLODIPine (NORVASC) 2.5 mg tablet TAKE 1 TABLET BY MOUTH ONCE A DAY FOR BLOOD PRESSURE      folic acid (FOLVITE) 1 mg tablet Take  by mouth daily.  lisinopril (PRINIVIL, ZESTRIL) 20 mg tablet Take  by mouth daily.  methotrexate (RHEUMATREX) 2.5 mg tablet Take 2.5 mg by mouth. Taking 6 tabs once every Tuesday      sertraline (ZOLOFT) 50 mg tablet Take 100 mg by mouth daily. Patient taking 3 tabs for depression       simvastatin (ZOCOR) 10 mg tablet Take  by mouth nightly.  traZODone (DESYREL) 50 mg tablet Take  by mouth nightly. Past History     Past Medical History:  Past Medical History:   Diagnosis Date    Arthritis     RA    Cerebral artery occlusion with cerebral infarction (HCC)     left side weakness and painful    Chronic pain     left side    Essential hypertension        Past Surgical History:  Past Surgical History:   Procedure Laterality Date    HX GYN      HX KNEE REPLACEMENT Left        Family History:  No family history on file. Social History:  Social History     Tobacco Use    Smoking status: Current Every Day Smoker    Smokeless tobacco: Never Used    Tobacco comment: 6-7 cigarettes a day   Substance Use Topics    Alcohol use:  Yes     Alcohol/week: 1.0 standard drinks     Types: 1 Cans of beer per week     Comment: occasionally    Drug use: Yes     Types: Marijuana, Prescription, OTC     Comment: uses nightly for anxiety  reason   last used couple nights ago          Allergies:  No Known Allergies      Review of Systems   Review of Systems   Constitutional: Negative for chills, diaphoresis and fever. HENT: Negative for congestion, ear pain, rhinorrhea and sore throat. Respiratory: Negative for cough and shortness of breath. Cardiovascular: Negative for chest pain. Gastrointestinal: Negative for abdominal pain, constipation, diarrhea, nausea and vomiting. Genitourinary: Negative for difficulty urinating, dysuria, frequency and hematuria. Musculoskeletal: Positive for arthralgias. Negative for myalgias. Neurological: Negative for headaches. All other systems reviewed and are negative. Physical Exam   Physical Exam  Vitals signs and nursing note reviewed. Constitutional:       General: She is not in acute distress. Appearance: She is well-developed. She is not diaphoretic. Comments: 69 y.o.  female    HENT:      Head: Normocephalic and atraumatic. Eyes:      General:         Right eye: No discharge. Left eye: No discharge. Conjunctiva/sclera: Conjunctivae normal.   Neck:      Musculoskeletal: Normal range of motion and neck supple. Cardiovascular:      Rate and Rhythm: Tachycardia present. Pulses: Normal pulses. Pulmonary:      Effort: Pulmonary effort is normal.   Musculoskeletal:      Comments: LEFT WRIST: Post surgical sugar tong splint in place. Fingers are swollen with intact sensation and cap refill < 2 sec. Distal NV intact. Skin:     General: Skin is warm and dry. Neurological:      Mental Status: She is alert and oriented to person, place, and time. Psychiatric:         Behavior: Behavior normal.         Diagnostic Study Results     Labs - none    Radiologic Studies - none    Medical Decision Making   I am the first provider for this patient.     I reviewed the vital signs, available nursing notes, past medical history, past surgical history, family history and social history. Vital Signs-Reviewed the patient's vital signs. Patient Vitals for the past 12 hrs:   Temp Pulse Resp BP SpO2   05/09/20 1651 98.9 °F (37.2 °C) (!) 108 16 126/80 98 %       Records Reviewed: Nursing Notes and Old Medical Records   Recent ED and surgical charts reviewed. Provider Notes (Medical Decision Making): The patient presents to the ED with interracial pain in the left (dominate) wrist status post surgical repair of a fracture yesterday. There seems to be a very social component to this ED visit today. Patient reports that she came to the ED because she is unable to care for herself at home. She appears to be anxious and upset the situation that she is unable to get the care that she deserves. There is no acute injury or reason for admission to the hospital.  Case management not available to help with nursing home placement, which the patient decline nonetheless. Multiple discussions were had with multiple parties in order to help Sonja Bañuelos get the help that she needs at home to recover from surgery. ED Course:   Initial assessment performed. The patients presenting problems have been discussed, and they are in agreement with the care plan formulated and outlined with them. I have encouraged them to ask questions as they arise throughout their visit.    5:21 PM  [employer] used to contact the on call orthopedist and [facility]. 5:30 PM  CONSULT NOTE:  6:01 PM  MAXIMO Kennedy spoke with Dr. Elena Mckinley, Consult for Orthopedics. Discussed available diagnostic tests and clinical findings. He is in agreement with care plans as outlined. He recommends removing the splint to check the surgical wound. Procedure Note - Splint Placement:  5:47 PM  Performed by: MAXIMO Kennedy   The post surgical sugar tong splint was removed in order to evaluate the surgical wound, which shows good signs of healing. There here no purulent drainage.  Neurovascularly intact prior to tx. The plaster sugartong splint was re-placed on pt's left wrist.  Joint was placed in neutral position. Neurovascularly intact after tx. The procedure took 1-15 minutes, and pt tolerated well.     5:55 PM  The patient reports that she wants to be admitted to the hospital. She reports that she can not care for herself at home and has no friends or family locally that can help. This is contradictory to what she mentioned early when she noted that her friend [employer] had been staying with her at her home post surgery and was their today when the ambulance arrived to bring her to the ED. I have informed the patient that it would be a much better option for her to either go home with family/friend support to to go to a rehab facility. The patient has adamantly refused nursing/rehab and again notes that she has no family support structure to be able to help.     6:01 PM  Discussed with the patient's friend, [employer], who reports that she took care of her yesterday after surgery. She is willing to stay with the patient throughout the day but can not stay with her over night. She has been communicating with the patient's sister who lives in [place] and will be coming to [place] to stay with the patient starting either tomorrow or Monday. [employer] agrees to help out during the day until that time. She reports that the patient cannot stay with her at her home. 6:08 PM  Dr. Subhash Steinberg has been in contact with the patient's sister, Ty Simpson, who is also her POA. He was told that the patient's other sister will be traveling from [place] to [facility] to stay with the patient. This is consistent with what the friend Veronika mentioned. The patient's sister should arrive within the next 2 days. Dr. Subhash Steinberg has already recommended that we put the patient on Ativan for her anxiety. 6:15 PM  The above plan has been communicated with the patient.  She notes that she was aware of the plan, that her sister, Veronika, had called her and informed her that Michelle Delgado was coming to stay with her. Critical Care Time: None    Disposition:  DISCHARGE NOTE:  6:36 PM  The pt is ready for discharge. The pt's signs, symptoms, diagnosis, and discharge instructions have been discussed and pt has conveyed their understanding. The pt is to follow up as recommended or return to ER should their symptoms worsen. Plan has been discussed and pt is in agreement. PLAN:  1. Current Discharge Medication List      START taking these medications    Details   LORazepam (ATIVAN) 1 mg tablet Take 1 Tab by mouth every eight (8) hours as needed for Anxiety for up to 5 days. Max Daily Amount: 3 mg. Qty: 15 Tab, Refills: 0    Associated Diagnoses: Anxiety state      naloxone (NARCAN) 4 mg/actuation nasal spray Use 1 spray intranasally, then discard. Repeat with new spray every 2 min as needed for opioid overdose symptoms, alternating nostrils. Qty: 2 Each, Refills: 0         CONTINUE these medications which have NOT CHANGED    Details   oxyCODONE IR (ROXICODONE) 5 mg immediate release tablet Take 1-2 Tabs by mouth every four (4) hours as needed for Pain for up to 14 days. Max Daily Amount: 60 mg.  Qty: 40 Tab, Refills: 0    Associated Diagnoses: Open fracture of left wrist, initial encounter      cephALEXin (Keflex) 500 mg capsule Take 1 Cap by mouth four (4) times daily for 7 days. Qty: 28 Cap, Refills: 0      gabapentin (NEURONTIN) 100 mg capsule Take 1 Cap by mouth nightly. Max Daily Amount: 100 mg. Qty: 30 Cap, Refills: 2    Associated Diagnoses: Neuropathic pain      Arm Brace misc Bilateral carpal tunnel splint to be used every night  Qty: 2 Each, Refills: 0    Associated Diagnoses: Bilateral carpal tunnel syndrome      inFLIXimab (REMICADE) 100 mg injection by IntraVENous route every two (2) months. amLODIPine (NORVASC) 2.5 mg tablet TAKE 1 TABLET BY MOUTH ONCE A DAY FOR BLOOD PRESSURE      folic acid (FOLVITE) 1 mg tablet Take  by mouth daily.       lisinopril (PRINIVIL, ZESTRIL) 20 mg tablet Take  by mouth daily. methotrexate (RHEUMATREX) 2.5 mg tablet Take 2.5 mg by mouth. Taking 6 tabs once every Tuesday      sertraline (ZOLOFT) 50 mg tablet Take 100 mg by mouth daily. Patient taking 3 tabs for depression       simvastatin (ZOCOR) 10 mg tablet Take  by mouth nightly. traZODone (DESYREL) 50 mg tablet Take  by mouth nightly. 2.   Follow-up Information     Follow up With Specialties Details Why Radha Ag MD Hand Surgery In 1 week  [facility]  [address]  [phone]        3. Splint care as discussed. 4. Elevate and ice. Return to ED if worse     Diagnosis     Clinical Impression:   1. Pain associated with surgical procedure    2. Left wrist pain    3. Open fracture of left wrist, initial encounter    4. Anxiety state          Please note that this dictation was completed with Cuponzote, the computer voice recognition software. Quite often unanticipated grammatical, syntax, homophones, and other interpretive errors are inadvertently transcribed by the computer software. Please disregards these errors. Please excuse any errors that have escaped final proofreading. This note will not be viewable in [address]

## 2020-07-20 ENCOUNTER — HOSPITAL ENCOUNTER (INPATIENT)
Age: 70
LOS: 4 days | Discharge: HOME HEALTH | DRG: 496 | End: 2020-07-24
Attending: EMERGENCY MEDICINE | Admitting: ORTHOPAEDIC SURGERY
Payer: MEDICARE

## 2020-07-20 ENCOUNTER — APPOINTMENT (OUTPATIENT)
Dept: GENERAL RADIOLOGY | Age: 70
DRG: 496 | End: 2020-07-20
Attending: EMERGENCY MEDICINE
Payer: MEDICARE

## 2020-07-20 DIAGNOSIS — S52.602B TYPE I OR II OPEN FRACTURE OF DISTAL END OF BOTH RADIUS AND ULNA OF LEFT UPPER EXTREMITY, INITIAL ENCOUNTER: Primary | ICD-10-CM

## 2020-07-20 DIAGNOSIS — S52.502B TYPE I OR II OPEN FRACTURE OF DISTAL END OF BOTH RADIUS AND ULNA OF LEFT UPPER EXTREMITY, INITIAL ENCOUNTER: Primary | ICD-10-CM

## 2020-07-20 LAB
ALBUMIN SERPL-MCNC: 4.5 G/DL (ref 3.5–5)
ALBUMIN/GLOB SERPL: 1.2 {RATIO} (ref 1.1–2.2)
ALP SERPL-CCNC: 114 U/L (ref 45–117)
ALT SERPL-CCNC: 22 U/L (ref 12–78)
AMPHET UR QL SCN: NEGATIVE
ANION GAP SERPL CALC-SCNC: 6 MMOL/L (ref 5–15)
APPEARANCE UR: CLEAR
APTT PPP: 25.1 SEC (ref 22.1–32)
AST SERPL-CCNC: 20 U/L (ref 15–37)
BACTERIA URNS QL MICRO: NEGATIVE /HPF
BARBITURATES UR QL SCN: NEGATIVE
BASOPHILS # BLD: 0.1 K/UL (ref 0–0.1)
BASOPHILS NFR BLD: 1 % (ref 0–1)
BENZODIAZ UR QL: NEGATIVE
BILIRUB SERPL-MCNC: 0.3 MG/DL (ref 0.2–1)
BILIRUB UR QL: NEGATIVE
BUN SERPL-MCNC: 13 MG/DL (ref 6–20)
BUN/CREAT SERPL: 15 (ref 12–20)
CALCIUM SERPL-MCNC: 9.3 MG/DL (ref 8.5–10.1)
CANNABINOIDS UR QL SCN: NEGATIVE
CHLORIDE SERPL-SCNC: 106 MMOL/L (ref 97–108)
CO2 SERPL-SCNC: 27 MMOL/L (ref 21–32)
COCAINE UR QL SCN: NEGATIVE
COLOR UR: ABNORMAL
CREAT SERPL-MCNC: 0.88 MG/DL (ref 0.55–1.02)
DIFFERENTIAL METHOD BLD: ABNORMAL
DRUG SCRN COMMENT,DRGCM: NORMAL
EOSINOPHIL # BLD: 0.1 K/UL (ref 0–0.4)
EOSINOPHIL NFR BLD: 1 % (ref 0–7)
EPITH CASTS URNS QL MICRO: ABNORMAL /LPF
ERYTHROCYTE [DISTWIDTH] IN BLOOD BY AUTOMATED COUNT: 13.8 % (ref 11.5–14.5)
ETHANOL SERPL-MCNC: <10 MG/DL
GLOBULIN SER CALC-MCNC: 3.7 G/DL (ref 2–4)
GLUCOSE SERPL-MCNC: 99 MG/DL (ref 65–100)
GLUCOSE UR STRIP.AUTO-MCNC: NEGATIVE MG/DL
HCT VFR BLD AUTO: 42.7 % (ref 35–47)
HGB BLD-MCNC: 14.1 G/DL (ref 11.5–16)
HGB UR QL STRIP: NEGATIVE
HYALINE CASTS URNS QL MICRO: ABNORMAL /LPF (ref 0–5)
IMM GRANULOCYTES # BLD AUTO: 0 K/UL (ref 0–0.04)
IMM GRANULOCYTES NFR BLD AUTO: 0 % (ref 0–0.5)
INR PPP: 1 (ref 0.9–1.1)
KETONES UR QL STRIP.AUTO: NEGATIVE MG/DL
LEUKOCYTE ESTERASE UR QL STRIP.AUTO: ABNORMAL
LYMPHOCYTES # BLD: 1.4 K/UL (ref 0.8–3.5)
LYMPHOCYTES NFR BLD: 14 % (ref 12–49)
MCH RBC QN AUTO: 32.6 PG (ref 26–34)
MCHC RBC AUTO-ENTMCNC: 33 G/DL (ref 30–36.5)
MCV RBC AUTO: 98.8 FL (ref 80–99)
METHADONE UR QL: NEGATIVE
MONOCYTES # BLD: 0.5 K/UL (ref 0–1)
MONOCYTES NFR BLD: 5 % (ref 5–13)
NEUTS SEG # BLD: 8 K/UL (ref 1.8–8)
NEUTS SEG NFR BLD: 79 % (ref 32–75)
NITRITE UR QL STRIP.AUTO: NEGATIVE
NRBC # BLD: 0 K/UL (ref 0–0.01)
NRBC BLD-RTO: 0 PER 100 WBC
OPIATES UR QL: NEGATIVE
PCP UR QL: NEGATIVE
PH UR STRIP: 7.5 [PH] (ref 5–8)
PLATELET # BLD AUTO: 276 K/UL (ref 150–400)
PMV BLD AUTO: 8.9 FL (ref 8.9–12.9)
POTASSIUM SERPL-SCNC: 4 MMOL/L (ref 3.5–5.1)
PROT SERPL-MCNC: 8.2 G/DL (ref 6.4–8.2)
PROT UR STRIP-MCNC: NEGATIVE MG/DL
PROTHROMBIN TIME: 10.7 SEC (ref 9–11.1)
RBC # BLD AUTO: 4.32 M/UL (ref 3.8–5.2)
RBC #/AREA URNS HPF: ABNORMAL /HPF (ref 0–5)
SODIUM SERPL-SCNC: 139 MMOL/L (ref 136–145)
SP GR UR REFRACTOMETRY: 1.01 (ref 1–1.03)
THERAPEUTIC RANGE,PTTT: NORMAL SECS (ref 58–77)
UA: UC IF INDICATED,UAUC: ABNORMAL
UROBILINOGEN UR QL STRIP.AUTO: 0.2 EU/DL (ref 0.2–1)
WBC # BLD AUTO: 10.1 K/UL (ref 3.6–11)
WBC URNS QL MICRO: ABNORMAL /HPF (ref 0–4)

## 2020-07-20 PROCEDURE — 74011250636 HC RX REV CODE- 250/636: Performed by: NURSE PRACTITIONER

## 2020-07-20 PROCEDURE — 73090 X-RAY EXAM OF FOREARM: CPT

## 2020-07-20 PROCEDURE — 74011250637 HC RX REV CODE- 250/637: Performed by: NURSE PRACTITIONER

## 2020-07-20 PROCEDURE — 74011000250 HC RX REV CODE- 250: Performed by: NURSE PRACTITIONER

## 2020-07-20 PROCEDURE — 80307 DRUG TEST PRSMV CHEM ANLYZR: CPT

## 2020-07-20 PROCEDURE — 74011250637 HC RX REV CODE- 250/637: Performed by: EMERGENCY MEDICINE

## 2020-07-20 PROCEDURE — 74011250636 HC RX REV CODE- 250/636: Performed by: EMERGENCY MEDICINE

## 2020-07-20 PROCEDURE — 85025 COMPLETE CBC W/AUTO DIFF WBC: CPT

## 2020-07-20 PROCEDURE — 80053 COMPREHEN METABOLIC PANEL: CPT

## 2020-07-20 PROCEDURE — 99285 EMERGENCY DEPT VISIT HI MDM: CPT

## 2020-07-20 PROCEDURE — 71045 X-RAY EXAM CHEST 1 VIEW: CPT

## 2020-07-20 PROCEDURE — 74011250637 HC RX REV CODE- 250/637: Performed by: INTERNAL MEDICINE

## 2020-07-20 PROCEDURE — 96372 THER/PROPH/DIAG INJ SC/IM: CPT

## 2020-07-20 PROCEDURE — 85730 THROMBOPLASTIN TIME PARTIAL: CPT

## 2020-07-20 PROCEDURE — 74011000258 HC RX REV CODE- 258: Performed by: EMERGENCY MEDICINE

## 2020-07-20 PROCEDURE — 85610 PROTHROMBIN TIME: CPT

## 2020-07-20 PROCEDURE — 65270000029 HC RM PRIVATE

## 2020-07-20 PROCEDURE — 96375 TX/PRO/DX INJ NEW DRUG ADDON: CPT

## 2020-07-20 PROCEDURE — 81001 URINALYSIS AUTO W/SCOPE: CPT

## 2020-07-20 PROCEDURE — 36415 COLL VENOUS BLD VENIPUNCTURE: CPT

## 2020-07-20 RX ORDER — SODIUM CHLORIDE 0.9 % (FLUSH) 0.9 %
5-40 SYRINGE (ML) INJECTION EVERY 8 HOURS
Status: DISCONTINUED | OUTPATIENT
Start: 2020-07-20 | End: 2020-07-24 | Stop reason: HOSPADM

## 2020-07-20 RX ORDER — LISINOPRIL 20 MG/1
20 TABLET ORAL DAILY
Status: DISCONTINUED | OUTPATIENT
Start: 2020-07-20 | End: 2020-07-24 | Stop reason: HOSPADM

## 2020-07-20 RX ORDER — SODIUM CHLORIDE 9 MG/ML
75 INJECTION, SOLUTION INTRAVENOUS CONTINUOUS
Status: DISCONTINUED | OUTPATIENT
Start: 2020-07-20 | End: 2020-07-23

## 2020-07-20 RX ORDER — FOLIC ACID 1 MG/1
1 TABLET ORAL DAILY
Status: DISCONTINUED | OUTPATIENT
Start: 2020-07-21 | End: 2020-07-24 | Stop reason: HOSPADM

## 2020-07-20 RX ORDER — OXYCODONE HYDROCHLORIDE 5 MG/1
2.5 TABLET ORAL
Status: DISCONTINUED | OUTPATIENT
Start: 2020-07-20 | End: 2020-07-20

## 2020-07-20 RX ORDER — AMLODIPINE BESYLATE 5 MG/1
5 TABLET ORAL DAILY
Status: DISCONTINUED | OUTPATIENT
Start: 2020-07-20 | End: 2020-07-23

## 2020-07-20 RX ORDER — ONDANSETRON 2 MG/ML
4 INJECTION INTRAMUSCULAR; INTRAVENOUS
Status: DISCONTINUED | OUTPATIENT
Start: 2020-07-20 | End: 2020-07-20

## 2020-07-20 RX ORDER — FENTANYL CITRATE 50 UG/ML
100 INJECTION, SOLUTION INTRAMUSCULAR; INTRAVENOUS
Status: COMPLETED | OUTPATIENT
Start: 2020-07-20 | End: 2020-07-20

## 2020-07-20 RX ORDER — SODIUM CHLORIDE 0.9 % (FLUSH) 0.9 %
5-40 SYRINGE (ML) INJECTION AS NEEDED
Status: DISCONTINUED | OUTPATIENT
Start: 2020-07-20 | End: 2020-07-24 | Stop reason: HOSPADM

## 2020-07-20 RX ORDER — ACETAMINOPHEN 325 MG/1
650 TABLET ORAL EVERY 6 HOURS
Status: DISCONTINUED | OUTPATIENT
Start: 2020-07-20 | End: 2020-07-24 | Stop reason: HOSPADM

## 2020-07-20 RX ORDER — HYDROCODONE BITARTRATE AND ACETAMINOPHEN 10; 325 MG/1; MG/1
1 TABLET ORAL
Status: DISCONTINUED | OUTPATIENT
Start: 2020-07-20 | End: 2020-07-22

## 2020-07-20 RX ORDER — ONDANSETRON 4 MG/1
4 TABLET, ORALLY DISINTEGRATING ORAL
Status: COMPLETED | OUTPATIENT
Start: 2020-07-20 | End: 2020-07-20

## 2020-07-20 RX ORDER — GABAPENTIN 300 MG/1
600 CAPSULE ORAL 3 TIMES DAILY
COMMUNITY

## 2020-07-20 RX ORDER — LABETALOL HYDROCHLORIDE 5 MG/ML
10 INJECTION, SOLUTION INTRAVENOUS
Status: DISCONTINUED | OUTPATIENT
Start: 2020-07-20 | End: 2020-07-24 | Stop reason: HOSPADM

## 2020-07-20 RX ORDER — OXYCODONE HYDROCHLORIDE 5 MG/1
5 TABLET ORAL
Status: DISCONTINUED | OUTPATIENT
Start: 2020-07-20 | End: 2020-07-20

## 2020-07-20 RX ORDER — AMOXICILLIN 250 MG
2 CAPSULE ORAL 2 TIMES DAILY
Status: DISCONTINUED | OUTPATIENT
Start: 2020-07-20 | End: 2020-07-24 | Stop reason: HOSPADM

## 2020-07-20 RX ORDER — SERTRALINE HYDROCHLORIDE 50 MG/1
100 TABLET, FILM COATED ORAL DAILY
Status: DISCONTINUED | OUTPATIENT
Start: 2020-07-21 | End: 2020-07-24 | Stop reason: HOSPADM

## 2020-07-20 RX ORDER — ATORVASTATIN CALCIUM 10 MG/1
10 TABLET, FILM COATED ORAL DAILY
Status: DISCONTINUED | OUTPATIENT
Start: 2020-07-21 | End: 2020-07-24 | Stop reason: HOSPADM

## 2020-07-20 RX ORDER — FENTANYL CITRATE 50 UG/ML
100 INJECTION, SOLUTION INTRAMUSCULAR; INTRAVENOUS
Status: DISCONTINUED | OUTPATIENT
Start: 2020-07-20 | End: 2020-07-20

## 2020-07-20 RX ORDER — NALOXONE HYDROCHLORIDE 0.4 MG/ML
0.4 INJECTION, SOLUTION INTRAMUSCULAR; INTRAVENOUS; SUBCUTANEOUS AS NEEDED
Status: DISCONTINUED | OUTPATIENT
Start: 2020-07-20 | End: 2020-07-24 | Stop reason: HOSPADM

## 2020-07-20 RX ORDER — TRAZODONE HYDROCHLORIDE 50 MG/1
50 TABLET ORAL
Status: DISCONTINUED | OUTPATIENT
Start: 2020-07-20 | End: 2020-07-24 | Stop reason: HOSPADM

## 2020-07-20 RX ORDER — LORAZEPAM 2 MG/ML
1 INJECTION INTRAMUSCULAR
Status: COMPLETED | OUTPATIENT
Start: 2020-07-20 | End: 2020-07-20

## 2020-07-20 RX ORDER — HYDROCODONE BITARTRATE AND ACETAMINOPHEN 5; 325 MG/1; MG/1
1 TABLET ORAL
Status: DISCONTINUED | OUTPATIENT
Start: 2020-07-20 | End: 2020-07-22

## 2020-07-20 RX ORDER — GABAPENTIN 100 MG/1
200 CAPSULE ORAL 4 TIMES DAILY
Status: DISCONTINUED | OUTPATIENT
Start: 2020-07-20 | End: 2020-07-24 | Stop reason: HOSPADM

## 2020-07-20 RX ORDER — ONDANSETRON 2 MG/ML
4 INJECTION INTRAMUSCULAR; INTRAVENOUS
Status: DISCONTINUED | OUTPATIENT
Start: 2020-07-20 | End: 2020-07-24 | Stop reason: HOSPADM

## 2020-07-20 RX ADMIN — LORAZEPAM 1 MG: 2 INJECTION INTRAMUSCULAR; INTRAVENOUS at 14:27

## 2020-07-20 RX ADMIN — AMLODIPINE BESYLATE 5 MG: 5 TABLET ORAL at 17:41

## 2020-07-20 RX ADMIN — GABAPENTIN 200 MG: 100 CAPSULE ORAL at 21:51

## 2020-07-20 RX ADMIN — Medication 10 ML: at 22:00

## 2020-07-20 RX ADMIN — SODIUM CHLORIDE 500 ML: 900 INJECTION, SOLUTION INTRAVENOUS at 15:00

## 2020-07-20 RX ADMIN — LISINOPRIL 20 MG: 20 TABLET ORAL at 17:41

## 2020-07-20 RX ADMIN — DOCUSATE SODIUM - SENNOSIDES 2 TABLET: 50; 8.6 TABLET, FILM COATED ORAL at 17:41

## 2020-07-20 RX ADMIN — Medication 10 ML: at 14:29

## 2020-07-20 RX ADMIN — TRAZODONE HYDROCHLORIDE 50 MG: 50 TABLET ORAL at 21:51

## 2020-07-20 RX ADMIN — CEFAZOLIN SODIUM 1 G: 1 INJECTION, POWDER, FOR SOLUTION INTRAMUSCULAR; INTRAVENOUS at 15:03

## 2020-07-20 RX ADMIN — Medication 10 ML: at 23:10

## 2020-07-20 RX ADMIN — ACETAMINOPHEN 650 MG: 325 TABLET ORAL at 23:51

## 2020-07-20 RX ADMIN — WATER 2 G: 1 INJECTION INTRAMUSCULAR; INTRAVENOUS; SUBCUTANEOUS at 23:51

## 2020-07-20 RX ADMIN — ONDANSETRON 4 MG: 4 TABLET, ORALLY DISINTEGRATING ORAL at 13:52

## 2020-07-20 RX ADMIN — SODIUM CHLORIDE 75 ML/HR: 900 INJECTION, SOLUTION INTRAVENOUS at 23:10

## 2020-07-20 RX ADMIN — FENTANYL CITRATE 100 MCG: 50 INJECTION, SOLUTION INTRAMUSCULAR; INTRAVENOUS at 13:51

## 2020-07-20 RX ADMIN — HYDROCODONE BITARTRATE AND ACETAMINOPHEN 1 TABLET: 10; 325 TABLET ORAL at 17:41

## 2020-07-20 RX ADMIN — ACETAMINOPHEN 650 MG: 325 TABLET ORAL at 17:41

## 2020-07-20 RX ADMIN — GABAPENTIN 200 MG: 100 CAPSULE ORAL at 17:41

## 2020-07-20 NOTE — PROCEDURES
PROCEDURE NOTE - SPLINT PLACEMENT:  5:19 PM    Pt was found to have open L distal radius/ulna fracture with displacement which requires immobilization. Neurovascularly intact prior to splint placement. An Orthoglass posterior long arm splint was placed on pt's left arm. Neurovascularly intact post splint placement.

## 2020-07-20 NOTE — ED NOTES
TRANSFER - OUT REPORT:    Verbal report given to Kehinde Lowery RN (name) on Jil Nye  being transferred to orthopedics (unit) for routine progression of care       Report consisted of patients Situation, Background, Assessment and   Recommendations(SBAR). Information from the following report(s) SBAR was reviewed with the receiving nurse. Lines:   Peripheral IV 07/20/20 Right Antecubital (Active)        Opportunity for questions and clarification was provided.

## 2020-07-20 NOTE — H&P
ORTHOPAEDIC H&P    Subjective:     Date of Consultation:  July 20, 2020      Kathleen Diop is a 69 y.o. female who is being seen for L wrist deformity s/p fall today. Pt is s/p L ORIF in May with Dr. Lindalee Canavan and has been well until today. Work up has reveled an open L distal radius/ulna fracture. Pt reports hx of CVA and nerve pain in all extremities since CVA. Patient Active Problem List    Diagnosis Date Noted    Open fracture of left distal radius and ulna 07/20/2020    Open fracture of left distal radius 07/20/2020    Left wrist fracture 05/04/2020     No family history on file. Social History     Tobacco Use    Smoking status: Current Every Day Smoker    Smokeless tobacco: Never Used    Tobacco comment: 6-7 cigarettes a day   Substance Use Topics    Alcohol use: Yes     Alcohol/week: 1.0 standard drinks     Types: 1 Cans of beer per week     Comment: occasionally     Past Medical History:   Diagnosis Date    Arthritis     RA    Cerebral artery occlusion with cerebral infarction (HCC)     left side weakness and painful    Chronic pain     left side    Essential hypertension       Past Surgical History:   Procedure Laterality Date    HX GYN      HX KNEE REPLACEMENT Left       Prior to Admission medications    Medication Sig Start Date End Date Taking? Authorizing Provider   amLODIPine (NORVASC) 2.5 mg tablet TAKE 1 TABLET BY MOUTH ONCE A DAY FOR BLOOD PRESSURE 5/22/19  Yes Provider, Historical   naloxone (NARCAN) 4 mg/actuation nasal spray Use 1 spray intranasally, then discard. Repeat with new spray every 2 min as needed for opioid overdose symptoms, alternating nostrils. 5/9/20   MAXIMO Krueger   inFLIXimab (REMICADE) 100 mg injection by IntraVENous route every two (2) months. Provider, Historical   folic acid (FOLVITE) 1 mg tablet Take  by mouth daily. Provider, Historical   lisinopril (PRINIVIL, ZESTRIL) 20 mg tablet Take  by mouth daily.     Provider, Historical   methotrexate (RHEUMATREX) 2.5 mg tablet Take 2.5 mg by mouth. Taking 6 tabs once every Tuesday    Provider, Historical   sertraline (ZOLOFT) 50 mg tablet Take 100 mg by mouth daily. Patient taking 3 tabs for depression     Provider, Historical   simvastatin (ZOCOR) 10 mg tablet Take  by mouth nightly. Provider, Historical   traZODone (DESYREL) 50 mg tablet Take  by mouth nightly.     Provider, Historical     Current Facility-Administered Medications   Medication Dose Route Frequency    sodium chloride (NS) flush 5-40 mL  5-40 mL IntraVENous Q8H    sodium chloride (NS) flush 5-40 mL  5-40 mL IntraVENous PRN    amLODIPine (NORVASC) tablet 5 mg  5 mg Oral DAILY    [START ON 2/31/9370] folic acid (FOLVITE) tablet 1 mg  1 mg Oral DAILY    gabapentin (NEURONTIN) capsule 200 mg  200 mg Oral QID    lisinopriL (PRINIVIL, ZESTRIL) tablet 20 mg  20 mg Oral DAILY    [START ON 7/21/2020] sertraline (ZOLOFT) tablet 100 mg  100 mg Oral DAILY    [START ON 7/21/2020] atorvastatin (LIPITOR) tablet 10 mg  10 mg Oral DAILY    traZODone (DESYREL) tablet 50 mg  50 mg Oral QHS    labetaloL (NORMODYNE;TRANDATE) injection 10 mg  10 mg IntraVENous Q4H PRN    0.9% sodium chloride infusion  75 mL/hr IntraVENous CONTINUOUS    sodium chloride (NS) flush 5-40 mL  5-40 mL IntraVENous Q8H    sodium chloride (NS) flush 5-40 mL  5-40 mL IntraVENous PRN    acetaminophen (TYLENOL) tablet 650 mg  650 mg Oral Q6H    oxyCODONE IR (ROXICODONE) tablet 2.5 mg  2.5 mg Oral Q4H PRN    oxyCODONE IR (ROXICODONE) tablet 5 mg  5 mg Oral Q4H PRN    naloxone (NARCAN) injection 0.4 mg  0.4 mg IntraVENous PRN    ondansetron (ZOFRAN) injection 4 mg  4 mg IntraVENous Q6H PRN    senna-docusate (PERICOLACE) 8.6-50 mg per tablet 2 Tab  2 Tab Oral BID    ceFAZolin (ANCEF) 2 g in sterile water (preservative free) 20 mL IV syringe  2 g IntraVENous Q8H     Current Outpatient Medications   Medication Sig    amLODIPine (NORVASC) 2.5 mg tablet TAKE 1 TABLET BY MOUTH ONCE A DAY FOR BLOOD PRESSURE    naloxone (NARCAN) 4 mg/actuation nasal spray Use 1 spray intranasally, then discard. Repeat with new spray every 2 min as needed for opioid overdose symptoms, alternating nostrils.  inFLIXimab (REMICADE) 100 mg injection by IntraVENous route every two (2) months.  folic acid (FOLVITE) 1 mg tablet Take  by mouth daily.  lisinopril (PRINIVIL, ZESTRIL) 20 mg tablet Take  by mouth daily.  methotrexate (RHEUMATREX) 2.5 mg tablet Take 2.5 mg by mouth. Taking 6 tabs once every Tuesday    sertraline (ZOLOFT) 50 mg tablet Take 100 mg by mouth daily. Patient taking 3 tabs for depression     simvastatin (ZOCOR) 10 mg tablet Take  by mouth nightly.  traZODone (DESYREL) 50 mg tablet Take  by mouth nightly. No Known Allergies     Review of Systems:  A comprehensive review of systems was negative except for that written in the HPI. Mental Status: no dementia, pt is very anxious     Objective:     Patient Vitals for the past 8 hrs:   BP Temp Pulse Resp SpO2 Height Weight   20 1600 191/90         20 1530 (!) 195/108    97 %     20 1500     93 %     20 1430 (!) 201/92    97 %     20 1415 (!) 210/97    99 %     20 1400 (!) 208/92    99 %     20 1335 194/90 97.9 °F (36.6 °C) 83 20 98 % 5' 3\" (1.6 m) 53.5 kg (118 lb)     Temp (24hrs), Av.9 °F (36.6 °C), Min:97.9 °F (36.6 °C), Max:97.9 °F (36.6 °C)      Gen: Well-developed, very anxious and crying - ? ETOH odor    Musc: LUE - + deformity of wrist with open puncture wound to the dorsum of the wrist, NVI    Skin: puncture wound as above   Neuro: Cranial nerves are grossly intact, no focal motor weakness, follows commands appropriately   Psych: oriented to person, place and time, alert    Imaging Review:INDICATION: fall, arm pain, deformity.  Left arm pain     COMPARISON: None.     FINDINGS: Two views of the left radius and ulna demonstrate acute oblique  fractures through the distal diaphyses of the radius and Shanthi. Radial fracture  extends to the screw hole within the proximal aspect of the plate from previous  fracture. Similarly the ulnar fracture is just proximal to the previous fracture  as well. There is apex dorsal angulation with adjacent soft tissue injury. Bones  are osteopenic. Significant degeneration of the first [facility] joint     IMPRESSION  IMPRESSION:   1. Acute fractures of the distal radius and ulna. Labs:   Recent Results (from the past 24 hour(s))   CBC WITH AUTOMATED DIFF    Collection Time: 07/20/20  4:18 PM   Result Value Ref Range    WBC 10.1 3.6 - 11.0 K/uL    RBC 4.32 3.80 - 5.20 M/uL    HGB 14.1 11.5 - 16.0 g/dL    HCT 42.7 35.0 - 47.0 %    MCV 98.8 80.0 - 99.0 FL    MCH 32.6 26.0 - 34.0 PG    MCHC 33.0 30.0 - 36.5 g/dL    RDW 13.8 11.5 - 14.5 %    PLATELET 190 697 - 819 K/uL    MPV 8.9 8.9 - 12.9 FL    NRBC 0.0 0  WBC    ABSOLUTE NRBC 0.00 0.00 - 0.01 K/uL    NEUTROPHILS 79 (H) 32 - 75 %    LYMPHOCYTES 14 12 - 49 %    MONOCYTES 5 5 - 13 %    EOSINOPHILS 1 0 - 7 %    BASOPHILS 1 0 - 1 %    IMMATURE GRANULOCYTES 0 0.0 - 0.5 %    ABS. NEUTROPHILS 8.0 1.8 - 8.0 K/UL    ABS. LYMPHOCYTES 1.4 0.8 - 3.5 K/UL    ABS. MONOCYTES 0.5 0.0 - 1.0 K/UL    ABS. EOSINOPHILS 0.1 0.0 - 0.4 K/UL    ABS. BASOPHILS 0.1 0.0 - 0.1 K/UL    ABS. IMM.  GRANS. 0.0 0.00 - 0.04 K/UL    DF AUTOMATED     METABOLIC PANEL, COMPREHENSIVE    Collection Time: 07/20/20  4:18 PM   Result Value Ref Range    Sodium 139 136 - 145 mmol/L    Potassium 4.0 3.5 - 5.1 mmol/L    Chloride 106 97 - 108 mmol/L    CO2 27 21 - 32 mmol/L    Anion gap 6 5 - 15 mmol/L    Glucose 99 65 - 100 mg/dL    BUN 13 6 - 20 MG/DL    Creatinine 0.88 0.55 - 1.02 MG/DL    BUN/Creatinine ratio 15 12 - 20      GFR est AA >60 >60 ml/min/1.73m2    GFR est non-AA >60 >60 ml/min/1.73m2    Calcium 9.3 8.5 - 10.1 MG/DL    Bilirubin, total 0.3 0.2 - 1.0 MG/DL    ALT (SGPT) 22 12 - 78 U/L    AST (SGOT) 20 15 - 37 U/L    Alk.  phosphatase 114 45 - 117 U/L    Protein, total 8.2 6.4 - 8.2 g/dL    Albumin 4.5 3.5 - 5.0 g/dL    Globulin 3.7 2.0 - 4.0 g/dL    A-G Ratio 1.2 1.1 - 2.2     PROTHROMBIN TIME + INR    Collection Time: 07/20/20  4:18 PM   Result Value Ref Range    INR 1.0 0.9 - 1.1      Prothrombin time 10.7 9.0 - 11.1 sec   PTT    Collection Time: 07/20/20  4:18 PM   Result Value Ref Range    aPTT 25.1 22.1 - 32.0 sec    aPTT, therapeutic range     58.0 - 77.0 SECS   URINALYSIS W/ REFLEX CULTURE    Collection Time: 07/20/20  4:18 PM    Specimen: Urine   Result Value Ref Range    Color YELLOW/STRAW      Appearance CLEAR CLEAR      Specific gravity 1.012 1.003 - 1.030      pH (UA) 7.5 5.0 - 8.0      Protein Negative NEG mg/dL    Glucose Negative NEG mg/dL    Ketone Negative NEG mg/dL    Bilirubin Negative NEG      Blood Negative NEG      Urobilinogen 0.2 0.2 - 1.0 EU/dL    Nitrites Negative NEG      Leukocyte Esterase TRACE (A) NEG      WBC 0-4 0 - 4 /hpf    RBC 0-5 0 - 5 /hpf    Epithelial cells FEW FEW /lpf    Bacteria Negative NEG /hpf    UA:UC IF INDICATED CULTURE NOT INDICATED BY UA RESULT CNI      Hyaline cast 0-2 0 - 5 /lpf   DRUG SCREEN, URINE    Collection Time: 07/20/20  4:18 PM   Result Value Ref Range    AMPHETAMINES Negative NEG      BARBITURATES Negative NEG      BENZODIAZEPINES Negative NEG      COCAINE Negative NEG      METHADONE Negative NEG      OPIATES Negative NEG      PCP(PHENCYCLIDINE) Negative NEG      THC (TH-CANNABINOL) Negative NEG      Drug screen comment (NOTE)          Impression:     Patient Active Problem List    Diagnosis Date Noted    Open fracture of left distal radius and ulna 07/20/2020    Open fracture of left distal radius 07/20/2020    Left wrist fracture 05/04/2020     Principal Problem:    Open fracture of left distal radius and ulna (7/20/2020)    Active Problems:    Open fracture of left distal radius (7/20/2020)        Plan:   -  Plan for ORIF of open L wrist fracture with Dr. Kadeem Nuñez on 7/22  -   hospitalist have been consulted for pre op clearance and Medical management,  Urine drug screen negative with ETOH level pending, IVF, pain management  -  DVT Prophylaxis - SCDs pre op  -  Admit to [facility] with IV ancef every 8 hours         Dr. Kadeem Nuñez aware and agrees with plan as above.         Olena Kahn, NP  Orthopedic Nurse Practitioner   [place]

## 2020-07-20 NOTE — CONSULTS
Hospitalist Consultation Note    NAME:  Eliazar Heading   :   1950   MRN:   [id number]     ATTENDING: No admitting provider for patient encounter. PCP:  Anne-Marie Dunne MD    Date/Time:  2020 4:34 PM      Recommendations/Plan:       Active Problems:    * No active hospital problems. *  Acute fractures of Left distal radius and ulna  -Management per Ortho including pain control, DVT prophylaxis. Preoperative clearance:  Based on revised cardiac risk index, she has about 1% risk of rate of cardiac death, nonfatal myocardial infarction and nonfatal cardiac arrest.  She also has about 1.3% risk of rate of myocardial infarction, pulmonary edema, ventricular fibrillation, primary cardiac arrest and complete heart block. Her lab work is still pending to accurately estimate her revised cardiac risk index score and once we have the lab work we will be able to further define her risk    Hypertensive urgency likely from pain  -Currently blood pressure is elevated with systolic around 430  -Increase home Norvasc to 5 mg. Continue home lisinopril. And labetalol as needed.  -Pain control should also help with blood pressure control so suggest aggressive pain management    History of rheumatoid arthritis  Depression  Dyslipidemia  History of CVA with residual left-sided weakness  -On Remicade and methotrexate at home. Continue folic acid  -Continue home Zoloft and trazodone  -Continue home statin  -We will consult pharmacy for medication reconciliation        Code Status: Full code  DVT Prophylaxis: Per orthopedics          Subjective:   REQUESTING PHYSICIAN: Dr Pham Gupta:    Pre op Selma Romero is a 69 y.o.   female who I was asked to see for preoperative clearance.   This is a very pleasant 50-year-old female with past medical history of rheumatoid arthritis, hypertension, CVA with residual left-sided weakness is coming to the hospital with chief complaints of fall and left wrist pain.  Patient reports that she recently got a new chair and was sitting on it this morning and fell from the chair since it did not have any handles and was not able to control her balance and landed on her left wrist and started having a 10 x 10 left wrist pain which is aggravated with even light touch and also movements and without any relieving factors. She does not report any associated lightheadedness, dizziness, chest pain or shortness of breath. She does not report pain anywhere else. On arrival to the hospital, she was noted to have high blood pressure. Labs were ordered and still pending. Ortho has seen the patient and we were consulted for preoperative clearance and also management of her medical problems. Past Medical History:   Diagnosis Date    Arthritis     RA    Cerebral artery occlusion with cerebral infarction (HCC)     left side weakness and painful    Chronic pain     left side    Essential hypertension       Past Surgical History:   Procedure Laterality Date    HX GYN      HX KNEE REPLACEMENT Left      Social History     Tobacco Use    Smoking status: Current Every Day Smoker    Smokeless tobacco: Never Used    Tobacco comment: 6-7 cigarettes a day   Substance Use Topics    Alcohol use: Yes     Alcohol/week: 1.0 standard drinks     Types: 1 Cans of beer per week     Comment: occasionally      Family history  No coronary artery disease in the  No Known Allergies   Prior to Admission medications    Medication Sig Start Date End Date Taking? Authorizing Provider   naloxone [facility]) 4 mg/actuation nasal spray Use 1 spray intranasally, then discard. Repeat with new spray every 2 min as needed for opioid overdose symptoms, alternating nostrils. 5/9/20   MAXIMO Green   gabapentin (NEURONTIN) 100 mg capsule Take 1 Cap by mouth nightly. Max Daily Amount: 100 mg. Patient taking differently: Take 200 mg by mouth four (4) times daily.  10/1/19   Melda Mcardle, MD Arm [facility] Bilateral carpal tunnel splint to be used every night 9/12/19   Kuldip Clark MD   inFLIXimab (REMICADE) 100 mg injection by IntraVENous route every two (2) months. Provider, Historical   amLODIPine (NORVASC) 2.5 mg tablet TAKE 1 TABLET BY MOUTH ONCE A DAY FOR BLOOD PRESSURE 5/22/19   Provider, Historical   folic acid (FOLVITE) 1 mg tablet Take  by mouth daily. Provider, Historical   lisinopril (PRINIVIL, ZESTRIL) 20 mg tablet Take  by mouth daily. Provider, Historical   methotrexate (RHEUMATREX) 2.5 mg tablet Take 2.5 mg by mouth. Taking 6 tabs once every Tuesday    Provider, Historical   sertraline (ZOLOFT) 50 mg tablet Take 100 mg by mouth daily. Patient taking 3 tabs for depression     Provider, Historical   simvastatin (ZOCOR) 10 mg tablet Take  by mouth nightly. Provider, Historical   traZODone (DESYREL) 50 mg tablet Take  by mouth nightly. Provider, Historical       REVIEW OF SYSTEMS:     Total of 12 systems reviewed as follows:   I am not able to complete the review of systems because:    The patient is intubated and sedated    The patient has altered mental status due to his acute medical problems    The patient has baseline aphasia from prior stroke(s)    The patient has baseline dementia and is not reliable historian                 POSITIVE= underlined text  Negative = text not underlined  General:  fever, chills, sweats, generalized weakness, weight loss/gain,      loss of appetite   Eyes:    blurred vision, eye pain, loss of vision, double vision  ENT:    rhinorrhea, pharyngitis   Respiratory:   cough, sputum production, SOB, wheezing, GONZALEZ, pleuritic pain   Cardiology:   chest pain, palpitations, orthopnea, PND, edema, syncope   Gastrointestinal:  abdominal pain , N/V, dysphagia, diarrhea, constipation, bleeding   Genitourinary:  frequency, urgency, dysuria, hematuria, incontinence   Muskuloskeletal :  arthralgia, myalgia   Hematology:  easy bruising, nose or gum bleeding, lymphadenopathy   Dermatological: rash, ulceration, pruritis   Endocrine:   hot flashes or polydipsia   Neurological:  headache, dizziness, confusion, focal weakness, paresthesia,     Speech difficulties, memory loss, gait disturbance  Psychological: Feelings of anxiety, depression, agitation    Objective:   VITALS:    Visit Vitals  BP (!) 201/92 (BP 1 Location: Right arm, BP Patient Position: At rest)   Pulse 83   Temp 97.9 °F (36.6 °C)   Resp 20   Ht 5' 3\" (1.6 m)   Wt 53.5 kg (118 lb)   SpO2 97%   BMI 20.90 kg/m²     Temp (24hrs), Av.9 °F (36.6 °C), Min:97.9 °F (36.6 °C), Max:97.9 °F (36.6 °C)      PHYSICAL EXAM:   General:    Alert, cooperative, uncomfortable, appears stated age. HEENT: Atraumatic, anicteric sclerae, pink conjunctivae     No oral ulcers, mucosa moist,  Neck:  Supple, symmetrical,  thyroid: non tender  Lungs:   Clear to auscultation bilaterally. No Wheezing or Rhonchi. No rales. Chest wall:  No tenderness  No Accessory muscle use. Heart:   Regular  rhythm,  No  murmur   No edema  Abdomen:   Soft, non-tender. Not distended. Bowel sounds normal  Extremities: Left forearm fracture deformity present, tenderness present  Skin:     Not pale. Not Jaundiced  No rashes   Psych:  Not anxious or agitated. Neurologic: EOMs intact. No facial asymmetry. No aphasia or slurred speech.   Alert and oriented X 4.     _______________________________________________________________________  Care Plan discussed with:    Comments   Patient y    Family  y  sister at bedside   RN y    Care Manager                    Consultant:      ____________________________________________________________________  TOTAL TIME:    60  mins    Comments    y Reviewed previous records   >50% of visit spent in counseling and coordination of care y Discussion with patient and/or family and questions answered       Critical Care Provided     Minutes non procedure based  ________________________________________________________________________  Signed: Hitesh Engel MD      Procedures: see electronic medical records for all procedures/Xrays and details which were not copied into this note but were reviewed prior to creation of Plan. LAB DATA REVIEWED:    No results found for this or any previous visit (from the past 24 hour(s)).     _____________________________  Hospitalist: Hitesh Engel MD

## 2020-07-20 NOTE — ED PROVIDER NOTES
EMERGENCY DEPARTMENT HISTORY AND PHYSICAL EXAM      Date: 7/20/2020  Patient Name: Lane Mccauley    History of Presenting Illness     Chief Complaint   Patient presents with   [facility] Fall     fell out of a chair and injured her left wrist with deformity; rescue reports pt history is stroke with left side weakness as well recent left wrist injury    Wrist Pain       History Provided By: Patient    HPI: Lane Mccauley, 69 y.o. female presents to the ED with cc of arm pain following fall from chair. Pt had fall landing on her left arm. She denies striking her head, there was no loss of consciousness. She denies any neck or back pain. She denies any CP or SOA. She denies any abd pain, n/v/d. She denies any pain or injury to the right upper extremity, eleanor lower extremity. Pt states pain left distal forearm, pain 10/10, throbbing ache, worsened with any movement. There are no alleviating factors. EMS placed arm board and sling. Pt has had coffee to drink, no food today. Tetanus up-to-date        There are no other complaints, changes, or physical findings at this time. PCP: Desmond Michael MD    No current facility-administered medications on file prior to encounter. Current Outpatient Medications on File Prior to Encounter   Medication Sig Dispense Refill    naloxone (NARCAN) 4 mg/actuation nasal spray Use 1 spray intranasally, then discard. Repeat with new spray every 2 min as needed for opioid overdose symptoms, alternating nostrils. 2 Each 0    gabapentin (NEURONTIN) 100 mg capsule Take 1 Cap by mouth nightly. Max Daily Amount: 100 mg. (Patient taking differently: Take 200 mg by mouth four (4) times daily.) 30 Cap 2    Arm Brace misc Bilateral carpal tunnel splint to be used every night 2 Each 0    inFLIXimab (REMICADE) 100 mg injection by IntraVENous route every two (2) months.       amLODIPine (NORVASC) 2.5 mg tablet TAKE 1 TABLET BY MOUTH ONCE A DAY FOR BLOOD PRESSURE      folic acid (FOLVITE) 1 mg tablet Take  by mouth daily.  lisinopril (PRINIVIL, ZESTRIL) 20 mg tablet Take  by mouth daily.  methotrexate (RHEUMATREX) 2.5 mg tablet Take 2.5 mg by mouth. Taking 6 tabs once every Tuesday      sertraline (ZOLOFT) 50 mg tablet Take 100 mg by mouth daily. Patient taking 3 tabs for depression       simvastatin (ZOCOR) 10 mg tablet Take  by mouth nightly.  traZODone (DESYREL) 50 mg tablet Take  by mouth nightly. Past History     Past Medical History:  Past Medical History:   Diagnosis Date    Arthritis     RA    Cerebral artery occlusion with cerebral infarction (HCC)     left side weakness and painful    Chronic pain     left side    Essential hypertension        Past Surgical History:  Past Surgical History:   Procedure Laterality Date    HX GYN      HX KNEE REPLACEMENT Left        Family History:  No family history on file. Social History:  Social History     Tobacco Use    Smoking status: Current Every Day Smoker    Smokeless tobacco: Never Used    Tobacco comment: 6-7 cigarettes a day   Substance Use Topics    Alcohol use: Yes     Alcohol/week: 1.0 standard drinks     Types: 1 Cans of beer per week     Comment: occasionally    Drug use: Yes     Types: Marijuana, Prescription, OTC     Comment: uses nightly for anxiety  reason   last used couple nights ago          Allergies:  No Known Allergies      Review of Systems   Review of Systems   Constitutional: Negative. Negative for appetite change, chills, fatigue and fever. HENT: Negative. Negative for congestion, rhinorrhea, sinus pressure and sore throat. Eyes: Negative. Respiratory: Negative. Negative for cough, choking, chest tightness, shortness of breath and wheezing. Cardiovascular: Negative. Negative for chest pain, palpitations and leg swelling. Gastrointestinal: Negative for abdominal pain, constipation, diarrhea, nausea and vomiting. Endocrine: Negative. Genitourinary: Negative.   Negative for difficulty urinating, dysuria, flank pain and urgency. Musculoskeletal:        Left forearm pain with deformity   Skin: Negative. Neurological: Negative. Negative for dizziness, speech difficulty, weakness, light-headedness, numbness and headaches. Psychiatric/Behavioral: Negative. All other systems reviewed and are negative. Physical Exam   Physical Exam  Vitals signs and nursing note reviewed. Constitutional:       General: She is not in acute distress. Appearance: She is well-developed. She is not diaphoretic. HENT:      Head: Normocephalic and atraumatic. Mouth/Throat:      Mouth: Mucous membranes are moist.      Pharynx: No oropharyngeal exudate. Eyes:      Extraocular Movements: Extraocular movements intact. Conjunctiva/sclera: Conjunctivae normal.      Pupils: Pupils are equal, round, and reactive to light. Neck:      Musculoskeletal: Normal range of motion and neck supple. No muscular tenderness. Vascular: No JVD. Trachea: No tracheal deviation. Cardiovascular:      Rate and Rhythm: Normal rate and regular rhythm. Heart sounds: Normal heart sounds. No murmur. Pulmonary:      Effort: Pulmonary effort is normal. No respiratory distress. Breath sounds: Normal breath sounds. No stridor. No wheezing or rales. Chest:      Chest wall: No tenderness. Abdominal:      General: There is no distension. Palpations: Abdomen is soft. Tenderness: There is no abdominal tenderness. There is no guarding or rebound. Musculoskeletal:      Comments: No C/T/L spine ttp, there is no ttp to right UE, pelvis, eleanor lower ext, the left forearm- deformity of distal 3rd, PMS intact     Skin:     General: Skin is warm and dry. Capillary Refill: Capillary refill takes less than 2 seconds. Neurological:      Mental Status: She is alert and oriented to person, place, and time. Cranial Nerves: No cranial nerve deficit.       Comments: No gross motor or sensory deficits    Psychiatric:         Mood and Affect: Mood normal.         Behavior: Behavior normal.         Diagnostic Study Results     Labs -  Pre-op orders sent    Radiologic Studies -   XR FOREARM LT AP/LAT   Final Result   IMPRESSION:    1. Acute fractures of the distal radius and ulna. CT Results  (Last 48 hours)    None        CXR Results  (Last 48 hours)    None          Medical Decision Making   I am the first provider for this patient. I reviewed the vital signs, available nursing notes, past medical history, past surgical history, family history and social history. Vital Signs-Reviewed the patient's vital signs. Patient Vitals for the past 12 hrs:   Temp Pulse Resp BP SpO2   07/20/20 1335 97.9 °F (36.6 °C) 83 20 194/90 98 %       Records Reviewed: Nursing Notes, Old Medical Records, Ambulance Run Sheet, Previous Radiology Studies and Previous Laboratory Studies    Provider Notes (Medical Decision Making):   DDx- Distal radius/ulna fx, arm contusion, laceration, abrasions      ED Course:   Initial assessment performed. The patients presenting problems have been discussed, and they are in agreement with the care plan formulated and outlined with them. I have encouraged them to ask questions as they arise throughout their visit. Pt with open distal radius/ulna frature. Will order a dose of IV Ab, discuss with Ortho     Consult Note:   Case discussed with Ortho, Jasiel NP, Dr. Lindalee Canavan. Will admit. Disposition:  Admit    Diagnosis     Clinical Impression:   1. Type I or II open fracture of distal end of both radius and ulna of left upper extremity, initial encounter    2. Hx of CVA      Attestations:    Marbella Harper, DO    Please note that this dictation was completed with Easy[employer], the computer voice recognition software. Quite often unanticipated grammatical, syntax, homophones, and other interpretive errors are inadvertently transcribed by the computer software.   Please disregard these errors. Please excuse any errors that have escaped final proofreading. Thank you.

## 2020-07-20 NOTE — ACP (ADVANCE CARE PLANNING)
Advance Care Planning Note      NAME: Willam Abrams   :  1950   MRN:  [id number]     Date/Time:  2020 4:42 PM    Active Diagnoses:  Hospital Problems  Date Reviewed: 2020    None      Acute left radius and ulna fracture  Noted arthritis  Hypertensive urgency  Dyslipidemia  Depression    These active diagnoses are of sufficient risk that focused discussion on advance care planning is indicated in order to allow the patient to thoughtfully consider personal goals of care, and if situations arise that prevent the ability to personally give input, to ensure appropriate representation of their personal desires for different levels and aggressiveness of care. Discussion:   Code status addressed and she is considering about not going on a breathing machine but she will discuss with her medical power of  who is her Sachin Jimenes and will make a decision after this hospitalization. She currently wants to be a full code. Patient wants central line and vasopressors if needed. Patient  would like to assign Sister Vic Maldonado as the surrogate decision maker. Persons present and participating in discussion: Darrin He MD, patient's other sister. Time Spent:   Total time spent face-to-face in education and discussion:   16   minutes.          Renato Larson MD   Hospitalist

## 2020-07-21 ENCOUNTER — ANESTHESIA EVENT (OUTPATIENT)
Dept: SURGERY | Age: 70
DRG: 496 | End: 2020-07-21
Payer: MEDICARE

## 2020-07-21 LAB
ABO + RH BLD: NORMAL
BASOPHILS # BLD: 0 K/UL (ref 0–0.1)
BASOPHILS NFR BLD: 1 % (ref 0–1)
BLOOD GROUP ANTIBODIES SERPL: NORMAL
DIFFERENTIAL METHOD BLD: ABNORMAL
EOSINOPHIL # BLD: 0.3 K/UL (ref 0–0.4)
EOSINOPHIL NFR BLD: 4 % (ref 0–7)
ERYTHROCYTE [DISTWIDTH] IN BLOOD BY AUTOMATED COUNT: 13.6 % (ref 11.5–14.5)
HCT VFR BLD AUTO: 39.4 % (ref 35–47)
HGB BLD-MCNC: 13.1 G/DL (ref 11.5–16)
IMM GRANULOCYTES # BLD AUTO: 0 K/UL (ref 0–0.04)
IMM GRANULOCYTES NFR BLD AUTO: 0 % (ref 0–0.5)
INR PPP: 1 (ref 0.9–1.1)
LYMPHOCYTES # BLD: 2.1 K/UL (ref 0.8–3.5)
LYMPHOCYTES NFR BLD: 26 % (ref 12–49)
MCH RBC QN AUTO: 33.2 PG (ref 26–34)
MCHC RBC AUTO-ENTMCNC: 33.2 G/DL (ref 30–36.5)
MCV RBC AUTO: 100 FL (ref 80–99)
MONOCYTES # BLD: 0.7 K/UL (ref 0–1)
MONOCYTES NFR BLD: 8 % (ref 5–13)
NEUTS SEG # BLD: 5 K/UL (ref 1.8–8)
NEUTS SEG NFR BLD: 61 % (ref 32–75)
NRBC # BLD: 0 K/UL (ref 0–0.01)
NRBC BLD-RTO: 0 PER 100 WBC
PLATELET # BLD AUTO: 271 K/UL (ref 150–400)
PMV BLD AUTO: 9 FL (ref 8.9–12.9)
PROTHROMBIN TIME: 10.7 SEC (ref 9–11.1)
RBC # BLD AUTO: 3.94 M/UL (ref 3.8–5.2)
SPECIMEN EXP DATE BLD: NORMAL
WBC # BLD AUTO: 8.1 K/UL (ref 3.6–11)

## 2020-07-21 PROCEDURE — 74011000250 HC RX REV CODE- 250: Performed by: NURSE PRACTITIONER

## 2020-07-21 PROCEDURE — 36415 COLL VENOUS BLD VENIPUNCTURE: CPT

## 2020-07-21 PROCEDURE — 74011250637 HC RX REV CODE- 250/637: Performed by: NURSE PRACTITIONER

## 2020-07-21 PROCEDURE — 74011250636 HC RX REV CODE- 250/636: Performed by: NURSE PRACTITIONER

## 2020-07-21 PROCEDURE — 94760 N-INVAS EAR/PLS OXIMETRY 1: CPT

## 2020-07-21 PROCEDURE — 65270000029 HC RM PRIVATE

## 2020-07-21 PROCEDURE — 86900 BLOOD TYPING SEROLOGIC ABO: CPT

## 2020-07-21 PROCEDURE — 85610 PROTHROMBIN TIME: CPT

## 2020-07-21 PROCEDURE — 85025 COMPLETE CBC W/AUTO DIFF WBC: CPT

## 2020-07-21 RX ORDER — ALPRAZOLAM 0.5 MG/1
0.5 TABLET ORAL
Status: COMPLETED | OUTPATIENT
Start: 2020-07-21 | End: 2020-07-21

## 2020-07-21 RX ORDER — LORAZEPAM 0.5 MG/1
0.5 TABLET ORAL
Status: DISCONTINUED | OUTPATIENT
Start: 2020-07-21 | End: 2020-07-24 | Stop reason: HOSPADM

## 2020-07-21 RX ADMIN — HYDROCODONE BITARTRATE AND ACETAMINOPHEN 1 TABLET: 10; 325 TABLET ORAL at 03:12

## 2020-07-21 RX ADMIN — WATER 2 G: 1 INJECTION INTRAMUSCULAR; INTRAVENOUS; SUBCUTANEOUS at 22:55

## 2020-07-21 RX ADMIN — ACETAMINOPHEN 650 MG: 325 TABLET ORAL at 11:18

## 2020-07-21 RX ADMIN — FOLIC ACID 1 MG: 1 TABLET ORAL at 09:20

## 2020-07-21 RX ADMIN — ACETAMINOPHEN 650 MG: 325 TABLET ORAL at 06:08

## 2020-07-21 RX ADMIN — Medication 10 ML: at 06:08

## 2020-07-21 RX ADMIN — WATER 2 G: 1 INJECTION INTRAMUSCULAR; INTRAVENOUS; SUBCUTANEOUS at 07:40

## 2020-07-21 RX ADMIN — Medication 10 ML: at 14:47

## 2020-07-21 RX ADMIN — DOCUSATE SODIUM - SENNOSIDES 2 TABLET: 50; 8.6 TABLET, FILM COATED ORAL at 18:05

## 2020-07-21 RX ADMIN — DOCUSATE SODIUM - SENNOSIDES 2 TABLET: 50; 8.6 TABLET, FILM COATED ORAL at 09:20

## 2020-07-21 RX ADMIN — Medication 10 ML: at 22:09

## 2020-07-21 RX ADMIN — LISINOPRIL 20 MG: 20 TABLET ORAL at 09:20

## 2020-07-21 RX ADMIN — ATORVASTATIN CALCIUM 10 MG: 10 TABLET, FILM COATED ORAL at 09:20

## 2020-07-21 RX ADMIN — Medication 10 ML: at 14:46

## 2020-07-21 RX ADMIN — GABAPENTIN 200 MG: 100 CAPSULE ORAL at 18:05

## 2020-07-21 RX ADMIN — SERTRALINE HYDROCHLORIDE 100 MG: 50 TABLET ORAL at 09:20

## 2020-07-21 RX ADMIN — WATER 2 G: 1 INJECTION INTRAMUSCULAR; INTRAVENOUS; SUBCUTANEOUS at 14:45

## 2020-07-21 RX ADMIN — GABAPENTIN 200 MG: 100 CAPSULE ORAL at 12:36

## 2020-07-21 RX ADMIN — HYDROCODONE BITARTRATE AND ACETAMINOPHEN 1 TABLET: 10; 325 TABLET ORAL at 21:02

## 2020-07-21 RX ADMIN — TRAZODONE HYDROCHLORIDE 50 MG: 50 TABLET ORAL at 22:08

## 2020-07-21 RX ADMIN — HYDROCODONE BITARTRATE AND ACETAMINOPHEN 1 TABLET: 10; 325 TABLET ORAL at 09:31

## 2020-07-21 RX ADMIN — AMLODIPINE BESYLATE 5 MG: 5 TABLET ORAL at 09:20

## 2020-07-21 RX ADMIN — GABAPENTIN 200 MG: 100 CAPSULE ORAL at 22:08

## 2020-07-21 RX ADMIN — ACETAMINOPHEN 650 MG: 325 TABLET ORAL at 18:05

## 2020-07-21 RX ADMIN — Medication 10 ML: at 21:03

## 2020-07-21 RX ADMIN — ALPRAZOLAM 0.5 MG: 0.5 TABLET ORAL at 11:19

## 2020-07-21 RX ADMIN — GABAPENTIN 200 MG: 100 CAPSULE ORAL at 09:20

## 2020-07-21 NOTE — PERIOP NOTES
Spoke with Roxana Quinones RN to get update on pt and advised of upcoming surgery tomorrow. Will call in AM for SBAR.

## 2020-07-21 NOTE — PROGRESS NOTES
Bedside and Verbal shift change report given to Valentino Edouard (oncoming nurse) by Asa Mcmahan (offgoing nurse). Report included the following information SBAR, Kardex, Intake/Output, MAR and Recent Results.

## 2020-07-21 NOTE — PROGRESS NOTES
Orthopedic NP Progress Note  Post Op day: * No surgery date entered *    July 21, 2020 10:58 AM     [facility] [place]    Attending Physician: Treatment Team: Attending Provider: Efrain Monsivais MD; Consulting Provider: Efrain Monsivais MD; Consulting Provider: Tirso Sandoval MD; Consulting Provider: Song Flores NP     Vital Signs:    Patient Vitals for the past 8 hrs:   BP Temp Pulse Resp SpO2   07/21/20 0722 151/79 98 °F (36.7 °C) 94 18 95 %     BMI (calculated): 20.9 (07/20/20 1335)    Intake/Output:  No intake/output data recorded. 07/19 1901 - 07/21 0700  In: 450 [P.O.:450]  Out: 850 [Urine:850]    Pain Control:   Pain Assessment  Pain Scale 1: Numeric (0 - 10)  Pain Intensity 1: 3  Pain Onset 1: (movement)  Pain Location 1: Wrist  Pain Orientation 1: Left  Pain Description 1: Aching  Pain Intervention(s) 1: Medication (see MAR)    LAB:    Recent Labs     07/21/20  0435   HCT 39.4   HGB 13.1     Lab Results   Component Value Date/Time    Sodium 139 07/20/2020 04:18 PM    Potassium 4.0 07/20/2020 04:18 PM    Chloride 106 07/20/2020 04:18 PM    CO2 27 07/20/2020 04:18 PM    Glucose 99 07/20/2020 04:18 PM    BUN 13 07/20/2020 04:18 PM    Creatinine 0.88 07/20/2020 04:18 PM    Calcium 9.3 07/20/2020 04:18 PM       Subjective:  [facility] [place] is a 69 y.o. female s/p a  Procedure(s):  REMOVAL HARDWARE LEFT WRIST/OPEN REDUCTION INTERNAL FIXATION LEFT BOTH BONE FORARM FRACTURE (MAC/REG) (URGENT)   Procedure(s):  REMOVAL HARDWARE LEFT WRIST/OPEN REDUCTION INTERNAL FIXATION LEFT BOTH BONE FORARM FRACTURE (MAC/REG) (URGENT). Tolerating diet. Pt feeling anxious this AM        Objective: General: alert, cooperative, no distress. Neuro/Vascular: CNS Intact. Sensation stable. Brisk cap refill, 2+ pulses UE/LE  Musculoskeletal:  +ROM UE/LE with long arm splint to LUE - good cap refill .     Skin warm and dry     Saldivar - n  Drain - n       PT/OT:   Gait:                      Assessment:    s/p Procedure(s):  REMOVAL HARDWARE LEFT WRIST/OPEN REDUCTION INTERNAL FIXATION LEFT BOTH BONE FORARM FRACTURE (MAC/REG) (URGENT)    Principal Problem:    Open fracture of left distal radius and ulna (7/20/2020)    Active Problems:    Open fracture of left distal radius (7/20/2020)         Plan:     -  Continue established methods of pain control  -  VTE Prophylaxes - TEDS &/or SCDs   -  Ancef Q 8 hours for open fracture  -  NPO after midnight with plan for ORIF in AM  -  Resumed Ativan . 5mg BID PRN - home dose PRN every day     Signed By: Agata Sequeira NP    Orthopedic Nurse Practitioner

## 2020-07-21 NOTE — PROGRESS NOTES
Problem: Falls - Risk of  Goal: *Absence of Falls  Outcome: Progressing Towards Goal  Note: Fall Risk Interventions:  Mobility Interventions: Communicate number of staff needed for ambulation/transfer         Medication Interventions: Evaluate medications/consider consulting pharmacy    Elimination Interventions: Toileting schedule/hourly rounds    History of Falls Interventions: Evaluate medications/consider consulting pharmacy         Problem: Falls - Risk of  Goal: *Absence of Falls  Outcome: Progressing Towards Goal  Note: Fall Risk Interventions:  Mobility Interventions: Communicate number of staff needed for ambulation/transfer         Medication Interventions: Evaluate medications/consider consulting pharmacy    Elimination Interventions:  Toileting schedule/hourly rounds    History of Falls Interventions: Evaluate medications/consider consulting pharmacy         Problem: Patient Education: Go to Patient Education Activity  Goal: Patient/Family Education  Outcome: Progressing Towards Goal     Problem: Patient Education: Go to Patient Education Activity  Goal: Patient/Family Education  Outcome: Progressing Towards Goal     Problem: Pressure Injury - Risk of  Goal: *Prevention of pressure injury  Outcome: Progressing Towards Goal  Note: Pressure Injury Interventions:  Sensory Interventions: Check visual cues for pain    Moisture Interventions: Absorbent underpads         Mobility Interventions: Pressure redistribution bed/mattress (bed type)    Nutrition Interventions: Document food/fluid/supplement intake                     Problem: Pressure Injury - Risk of  Goal: *Prevention of pressure injury  Outcome: Progressing Towards Goal  Note: Pressure Injury Interventions:  Sensory Interventions: Check visual cues for pain    Moisture Interventions: Absorbent underpads         Mobility Interventions: Pressure redistribution bed/mattress (bed type)    Nutrition Interventions: Document food/fluid/supplement intake

## 2020-07-21 NOTE — PROGRESS NOTES
Hospitalist Progress Note    NAME: Laina Mcclure   :  1950   MRN:  [id number]     Interim Hospital Summary: 69 y.o. female whom presented on 2020 with      Assessment / Plan:  Acute fractures of Left distal radius and ulna  -Management per Ortho including pain control, DVT prophylaxis  -Planned procedure tomorrow.     Hypertensive urgency likely from pain  -Continue with Norvasc and lisinopril    History of rheumatoid arthritis  Depression  Dyslipidemia  History of CVA with residual left-sided weakness  -On Remicade and methotrexate at home. Continue folic acid  -Continue home Zoloft and trazodone  -Continue home statin  -Resume PT OT after procedure    18.5 - 24.9 Normal weight / Body mass index is 20.9 kg/m². Code status: Full  Prophylaxis: per primary  Recommended Disposition: TBD       Subjective:     Chief Complaint / Reason for Physician Visit  Follow up of fall with left wrist fracture    Patient seen and examined. She feels frustrated about the fall. Otherwise she denies pain or fevers. Chart reviewed in detail. Discussed with RN events overnight. Review of Systems:  Symptom Y/N Comments  Symptom Y/N Comments   Fever/Chills    Chest Pain     Poor Appetite    Edema     Cough    Abdominal Pain     Sputum    Joint Pain X    SOB/GONZALEZ    Pruritis/Rash     Nausea/vomit    Tolerating PT/OT     Diarrhea    Tolerating Diet     Constipation    Other       Could NOT obtain due to:      PO intake: No data found. Objective:     VITALS:   Last 24hrs VS reviewed since prior progress note.  Most recent are:  Patient Vitals for the past 24 hrs:   Temp Pulse Resp BP SpO2   20 1616 98.3 °F (36.8 °C) 75 16 121/78 94 %   20 1103 98.7 °F (37.1 °C) 72 16 157/86 95 %   20 0722 98 °F (36.7 °C) 94 18 151/79 95 %   20 0257 98.6 °F (37 °C) 69 18 152/79 94 %   20 0000 97.9 °F (36.6 °C) 70 18 120/78    20 1901 97.7 °F (36.5 °C) 68 18 117/79 96 %   07/20/20 1740  80  (!) 147/102 97 %       Intake/Output Summary (Last 24 hours) at 7/21/2020 1642  Last data filed at 7/21/2020 0500  Gross per 24 hour   Intake 450 ml   Output 850 ml   Net -400 ml        PHYSICAL EXAM:  General: WD, WN. Alert, cooperative, no acute distress    EENT:  EOMI. Anicteric sclerae. MMM  Resp:  CTA bilaterally, no wheezing or rales. No accessory muscle use  CV:  Regular  rhythm,  No edema  GI:  Soft, Non distended, Non tender.  +Bowel sounds  Neurologic:  Alert and oriented X 3, normal speech,   Psych:   Good insight. Not anxious nor agitated  Skin:  No rashes. No jaundice  MSK - left Wrist in cast    Reviewed most current lab test results and cultures  YES  Reviewed most current radiology test results   YES  Review and summation of old records today    NO  Reviewed patient's current orders and MAR    YES  PMH/SH reviewed - no change compared to H&P  ________________________________________________________________________  Care Plan discussed with:    Comments   Patient X    Family  X    RN X    Care Manager     Consultant                        Multidiciplinary team rounds were held today with , nursing, pharmacist and clinical coordinator. Patient's plan of care was discussed; medications were reviewed and discharge planning was addressed. ________________________________________________________________________  Total NON critical care TIME:     Minutes    Total CRITICAL CARE TIME Spent:   Minutes non procedure based      Comments   >50% of visit spent in counseling and coordination of care x     This includes time during multidisciplinary rounds if indicated above   ________________________________________________________________________  Vicki Camacho MD     Procedures: see electronic medical records for all procedures/Xrays and details which were not copied into this note but were reviewed prior to creation of Plan.       LABS:  I reviewed today's most current labs and imaging studies.   Pertinent labs include:  Recent Labs     07/21/20 0435 07/20/20 1618   WBC 8.1 10.1   HGB 13.1 14.1   HCT 39.4 42.7    276     Recent Labs     07/21/20 0435 07/20/20  1618   NA  --  139   K  --  4.0   CL  --  106   CO2  --  27   GLU  --  99   BUN  --  13   CREA  --  0.88   CA  --  9.3   ALB  --  4.5   TBILI  --  0.3   ALT  --  22   INR 1.0 1.0

## 2020-07-22 ENCOUNTER — ANESTHESIA (OUTPATIENT)
Dept: SURGERY | Age: 70
DRG: 496 | End: 2020-07-22
Payer: MEDICARE

## 2020-07-22 PROCEDURE — 76060000065 HC AMB SURG ANES 2.5 TO 3 HR: Performed by: ORTHOPAEDIC SURGERY

## 2020-07-22 PROCEDURE — 65270000029 HC RM PRIVATE

## 2020-07-22 PROCEDURE — 74011250636 HC RX REV CODE- 250/636: Performed by: ANESTHESIOLOGY

## 2020-07-22 PROCEDURE — 77030035360 HC BIT DRL SKEL -B: Performed by: ORTHOPAEDIC SURGERY

## 2020-07-22 PROCEDURE — 77030018836 HC SOL IRR NACL ICUM -A: Performed by: ORTHOPAEDIC SURGERY

## 2020-07-22 PROCEDURE — 77030002933 HC SUT MCRYL J&J -A: Performed by: ORTHOPAEDIC SURGERY

## 2020-07-22 PROCEDURE — 74011250637 HC RX REV CODE- 250/637: Performed by: NURSE PRACTITIONER

## 2020-07-22 PROCEDURE — 74011000250 HC RX REV CODE- 250: Performed by: ORTHOPAEDIC SURGERY

## 2020-07-22 PROCEDURE — 77030031388 HC WRE K SKEL -B: Performed by: ORTHOPAEDIC SURGERY

## 2020-07-22 PROCEDURE — 77030020275 HC MISC ORTHOPEDIC: Performed by: ORTHOPAEDIC SURGERY

## 2020-07-22 PROCEDURE — 77030035359: Performed by: ORTHOPAEDIC SURGERY

## 2020-07-22 PROCEDURE — 77030002916 HC SUT ETHLN J&J -A: Performed by: ORTHOPAEDIC SURGERY

## 2020-07-22 PROCEDURE — C1713 ANCHOR/SCREW BN/BN,TIS/BN: HCPCS | Performed by: ORTHOPAEDIC SURGERY

## 2020-07-22 PROCEDURE — 74011250637 HC RX REV CODE- 250/637: Performed by: PHYSICIAN ASSISTANT

## 2020-07-22 PROCEDURE — 0PSJ04Z REPOSITION LEFT RADIUS WITH INTERNAL FIXATION DEVICE, OPEN APPROACH: ICD-10-PCS | Performed by: ORTHOPAEDIC SURGERY

## 2020-07-22 PROCEDURE — 74011250637 HC RX REV CODE- 250/637: Performed by: ORTHOPAEDIC SURGERY

## 2020-07-22 PROCEDURE — 76210000035 HC AMBSU PH I REC 1 TO 1.5 HR: Performed by: ORTHOPAEDIC SURGERY

## 2020-07-22 PROCEDURE — 74011250636 HC RX REV CODE- 250/636: Performed by: ORTHOPAEDIC SURGERY

## 2020-07-22 PROCEDURE — 74011250636 HC RX REV CODE- 250/636: Performed by: REGISTERED NURSE

## 2020-07-22 PROCEDURE — 77030035361 HC BIT DRL SLD PA GEMNS SKEL -B: Performed by: ORTHOPAEDIC SURGERY

## 2020-07-22 PROCEDURE — 0PPJ04Z REMOVAL OF INTERNAL FIXATION DEVICE FROM LEFT RADIUS, OPEN APPROACH: ICD-10-PCS | Performed by: ORTHOPAEDIC SURGERY

## 2020-07-22 PROCEDURE — 0PSL04Z REPOSITION LEFT ULNA WITH INTERNAL FIXATION DEVICE, OPEN APPROACH: ICD-10-PCS | Performed by: ORTHOPAEDIC SURGERY

## 2020-07-22 PROCEDURE — 77030038840 HC GD AIM SKEL -B: Performed by: ORTHOPAEDIC SURGERY

## 2020-07-22 PROCEDURE — 77030027378: Performed by: ORTHOPAEDIC SURGERY

## 2020-07-22 PROCEDURE — 74011000250 HC RX REV CODE- 250: Performed by: REGISTERED NURSE

## 2020-07-22 PROCEDURE — 76210000046 HC AMBSU PH II REC FIRST 0.5 HR: Performed by: ORTHOPAEDIC SURGERY

## 2020-07-22 PROCEDURE — 77030000032 HC CUF TRNQT ZIMM -B: Performed by: ORTHOPAEDIC SURGERY

## 2020-07-22 PROCEDURE — 77030037933 HC DRV DISP SKEL -B: Performed by: ORTHOPAEDIC SURGERY

## 2020-07-22 PROCEDURE — 77030020274 HC MISC IMPL ORTHOPEDIC: Performed by: ORTHOPAEDIC SURGERY

## 2020-07-22 PROCEDURE — 94760 N-INVAS EAR/PLS OXIMETRY 1: CPT

## 2020-07-22 PROCEDURE — 76030000022 HC AMB SURG 2.5 TO 3 HR INTENSV-TIER 1: Performed by: ORTHOPAEDIC SURGERY

## 2020-07-22 DEVICE — SCR CORT NLCK 3.5X12MM -- GEMINUS: Type: IMPLANTABLE DEVICE | Site: ARM | Status: FUNCTIONAL

## 2020-07-22 DEVICE — PEG BNE FIX L12MM DIA2.3MM DST RAD TI THRD LOK FOR VOLAR: Type: IMPLANTABLE DEVICE | Site: ARM | Status: FUNCTIONAL

## 2020-07-22 DEVICE — PEG BNE FIX L12MM DIA2.7MM DST RAD TI THRD NONLOCKING FOR: Type: IMPLANTABLE DEVICE | Site: ARM | Status: FUNCTIONAL

## 2020-07-22 DEVICE — SCREW BNE L12MM DIA3.5MM CORT DST VOLAR RAD TI LOK FULL: Type: IMPLANTABLE DEVICE | Site: ARM | Status: FUNCTIONAL

## 2020-07-22 DEVICE — SCR CORT NLCK 3.5X10MM -- GEMINUS: Type: IMPLANTABLE DEVICE | Site: ARM | Status: FUNCTIONAL

## 2020-07-22 DEVICE — PEG BNE FIX L14MM DIA2.3MM DST RAD TI THRD LOK FOR VOLAR: Type: IMPLANTABLE DEVICE | Site: ARM | Status: FUNCTIONAL

## 2020-07-22 DEVICE — SCREW BNE L16MM DIA2.5MM DST RAD VOLAR CO CHROM POLYAX LOK: Type: IMPLANTABLE DEVICE | Site: ARM | Status: FUNCTIONAL

## 2020-07-22 DEVICE — PEG LCK THRD 2.3X18MM -- GEMINUS: Type: IMPLANTABLE DEVICE | Site: ARM | Status: FUNCTIONAL

## 2020-07-22 DEVICE — DBM T43101 0.5CC GRAFTON PUTTY
Type: IMPLANTABLE DEVICE | Site: ARM | Status: FUNCTIONAL
Brand: GRAFTON®AND GRAFTON PLUS®DEMINERALIZED BONE MATRIX (DBM)

## 2020-07-22 DEVICE — PEG BNE FIX L18MM DIA2MM DST RAD TI OPT 2 LOK SMOOTH: Type: IMPLANTABLE DEVICE | Site: ARM | Status: FUNCTIONAL

## 2020-07-22 DEVICE — SCR CORT LCK 3.5X11MM --: Type: IMPLANTABLE DEVICE | Site: ARM | Status: FUNCTIONAL

## 2020-07-22 DEVICE — PEG LCK THRD 2.3X16MM -- GEMINUS: Type: IMPLANTABLE DEVICE | Site: ARM | Status: FUNCTIONAL

## 2020-07-22 RX ORDER — PROPOFOL 10 MG/ML
INJECTION, EMULSION INTRAVENOUS
Status: DISCONTINUED | OUTPATIENT
Start: 2020-07-22 | End: 2020-07-22 | Stop reason: HOSPADM

## 2020-07-22 RX ORDER — HYDROCODONE BITARTRATE AND ACETAMINOPHEN 7.5; 325 MG/1; MG/1
1 TABLET ORAL
Status: DISCONTINUED | OUTPATIENT
Start: 2020-07-22 | End: 2020-07-24 | Stop reason: HOSPADM

## 2020-07-22 RX ORDER — ONDANSETRON 2 MG/ML
4 INJECTION INTRAMUSCULAR; INTRAVENOUS AS NEEDED
Status: DISCONTINUED | OUTPATIENT
Start: 2020-07-22 | End: 2020-07-22 | Stop reason: HOSPADM

## 2020-07-22 RX ORDER — MORPHINE SULFATE 2 MG/ML
2 INJECTION, SOLUTION INTRAMUSCULAR; INTRAVENOUS
Status: DISCONTINUED | OUTPATIENT
Start: 2020-07-22 | End: 2020-07-24 | Stop reason: HOSPADM

## 2020-07-22 RX ORDER — ROPIVACAINE HYDROCHLORIDE 5 MG/ML
INJECTION, SOLUTION EPIDURAL; INFILTRATION; PERINEURAL
Status: COMPLETED | OUTPATIENT
Start: 2020-07-22 | End: 2020-07-22

## 2020-07-22 RX ORDER — OXYCODONE HYDROCHLORIDE 5 MG/1
15 TABLET ORAL
Status: DISCONTINUED | OUTPATIENT
Start: 2020-07-22 | End: 2020-07-22

## 2020-07-22 RX ORDER — ONDANSETRON 2 MG/ML
INJECTION INTRAMUSCULAR; INTRAVENOUS AS NEEDED
Status: DISCONTINUED | OUTPATIENT
Start: 2020-07-22 | End: 2020-07-22 | Stop reason: HOSPADM

## 2020-07-22 RX ORDER — HYDROMORPHONE HYDROCHLORIDE 1 MG/ML
.2-.5 INJECTION, SOLUTION INTRAMUSCULAR; INTRAVENOUS; SUBCUTANEOUS ONCE
Status: DISCONTINUED | OUTPATIENT
Start: 2020-07-22 | End: 2020-07-22 | Stop reason: HOSPADM

## 2020-07-22 RX ORDER — FENTANYL CITRATE 50 UG/ML
25 INJECTION, SOLUTION INTRAMUSCULAR; INTRAVENOUS
Status: DISCONTINUED | OUTPATIENT
Start: 2020-07-22 | End: 2020-07-24 | Stop reason: HOSPADM

## 2020-07-22 RX ORDER — SODIUM CHLORIDE 0.9 % (FLUSH) 0.9 %
5-40 SYRINGE (ML) INJECTION AS NEEDED
Status: DISCONTINUED | OUTPATIENT
Start: 2020-07-22 | End: 2020-07-24 | Stop reason: HOSPADM

## 2020-07-22 RX ORDER — MIDAZOLAM HYDROCHLORIDE 1 MG/ML
INJECTION, SOLUTION INTRAMUSCULAR; INTRAVENOUS
Status: COMPLETED
Start: 2020-07-22 | End: 2020-07-22

## 2020-07-22 RX ORDER — SODIUM CHLORIDE 0.9 % (FLUSH) 0.9 %
5-40 SYRINGE (ML) INJECTION AS NEEDED
Status: DISCONTINUED | OUTPATIENT
Start: 2020-07-22 | End: 2020-07-22 | Stop reason: HOSPADM

## 2020-07-22 RX ORDER — LIDOCAINE HYDROCHLORIDE 20 MG/ML
INJECTION, SOLUTION EPIDURAL; INFILTRATION; INTRACAUDAL; PERINEURAL AS NEEDED
Status: DISCONTINUED | OUTPATIENT
Start: 2020-07-22 | End: 2020-07-22 | Stop reason: HOSPADM

## 2020-07-22 RX ORDER — ROPIVACAINE HYDROCHLORIDE 5 MG/ML
INJECTION, SOLUTION EPIDURAL; INFILTRATION; PERINEURAL
Status: COMPLETED
Start: 2020-07-22 | End: 2020-07-22

## 2020-07-22 RX ORDER — DEXAMETHASONE SODIUM PHOSPHATE 4 MG/ML
INJECTION, SOLUTION INTRA-ARTICULAR; INTRALESIONAL; INTRAMUSCULAR; INTRAVENOUS; SOFT TISSUE AS NEEDED
Status: DISCONTINUED | OUTPATIENT
Start: 2020-07-22 | End: 2020-07-22

## 2020-07-22 RX ORDER — SODIUM CHLORIDE 0.9 % (FLUSH) 0.9 %
5-40 SYRINGE (ML) INJECTION EVERY 8 HOURS
Status: DISCONTINUED | OUTPATIENT
Start: 2020-07-22 | End: 2020-07-22 | Stop reason: HOSPADM

## 2020-07-22 RX ORDER — FENTANYL CITRATE 50 UG/ML
INJECTION, SOLUTION INTRAMUSCULAR; INTRAVENOUS AS NEEDED
Status: DISCONTINUED | OUTPATIENT
Start: 2020-07-22 | End: 2020-07-22 | Stop reason: HOSPADM

## 2020-07-22 RX ORDER — SODIUM CHLORIDE 0.9 % (FLUSH) 0.9 %
5-40 SYRINGE (ML) INJECTION EVERY 8 HOURS
Status: DISCONTINUED | OUTPATIENT
Start: 2020-07-22 | End: 2020-07-24 | Stop reason: HOSPADM

## 2020-07-22 RX ORDER — OXYCODONE AND ACETAMINOPHEN 5; 325 MG/1; MG/1
1 TABLET ORAL
Status: DISCONTINUED | OUTPATIENT
Start: 2020-07-22 | End: 2020-07-22 | Stop reason: HOSPADM

## 2020-07-22 RX ORDER — FENTANYL CITRATE 50 UG/ML
INJECTION, SOLUTION INTRAMUSCULAR; INTRAVENOUS
Status: COMPLETED
Start: 2020-07-22 | End: 2020-07-22

## 2020-07-22 RX ORDER — MORPHINE SULFATE 10 MG/ML
2 INJECTION, SOLUTION INTRAMUSCULAR; INTRAVENOUS
Status: DISCONTINUED | OUTPATIENT
Start: 2020-07-22 | End: 2020-07-22 | Stop reason: HOSPADM

## 2020-07-22 RX ORDER — HYDROMORPHONE HYDROCHLORIDE 2 MG/ML
INJECTION, SOLUTION INTRAMUSCULAR; INTRAVENOUS; SUBCUTANEOUS AS NEEDED
Status: DISCONTINUED | OUTPATIENT
Start: 2020-07-22 | End: 2020-07-22 | Stop reason: HOSPADM

## 2020-07-22 RX ORDER — DIPHENHYDRAMINE HYDROCHLORIDE 50 MG/ML
12.5 INJECTION, SOLUTION INTRAMUSCULAR; INTRAVENOUS AS NEEDED
Status: DISCONTINUED | OUTPATIENT
Start: 2020-07-22 | End: 2020-07-22 | Stop reason: HOSPADM

## 2020-07-22 RX ORDER — LIDOCAINE HYDROCHLORIDE 10 MG/ML
0.1 INJECTION, SOLUTION EPIDURAL; INFILTRATION; INTRACAUDAL; PERINEURAL AS NEEDED
Status: DISCONTINUED | OUTPATIENT
Start: 2020-07-22 | End: 2020-07-24 | Stop reason: HOSPADM

## 2020-07-22 RX ORDER — SODIUM CHLORIDE, SODIUM LACTATE, POTASSIUM CHLORIDE, CALCIUM CHLORIDE 600; 310; 30; 20 MG/100ML; MG/100ML; MG/100ML; MG/100ML
25 INJECTION, SOLUTION INTRAVENOUS CONTINUOUS
Status: DISCONTINUED | OUTPATIENT
Start: 2020-07-22 | End: 2020-07-22 | Stop reason: HOSPADM

## 2020-07-22 RX ORDER — MIDAZOLAM HYDROCHLORIDE 1 MG/ML
INJECTION, SOLUTION INTRAMUSCULAR; INTRAVENOUS AS NEEDED
Status: DISCONTINUED | OUTPATIENT
Start: 2020-07-22 | End: 2020-07-22 | Stop reason: HOSPADM

## 2020-07-22 RX ORDER — SODIUM CHLORIDE, SODIUM LACTATE, POTASSIUM CHLORIDE, CALCIUM CHLORIDE 600; 310; 30; 20 MG/100ML; MG/100ML; MG/100ML; MG/100ML
25 INJECTION, SOLUTION INTRAVENOUS CONTINUOUS
Status: DISCONTINUED | OUTPATIENT
Start: 2020-07-22 | End: 2020-07-23

## 2020-07-22 RX ORDER — FENTANYL CITRATE 50 UG/ML
25 INJECTION, SOLUTION INTRAMUSCULAR; INTRAVENOUS
Status: DISCONTINUED | OUTPATIENT
Start: 2020-07-22 | End: 2020-07-22 | Stop reason: HOSPADM

## 2020-07-22 RX ADMIN — WATER 2 G: 1 INJECTION INTRAMUSCULAR; INTRAVENOUS; SUBCUTANEOUS at 10:26

## 2020-07-22 RX ADMIN — HYDROMORPHONE HYDROCHLORIDE 0.2 MG: 2 INJECTION, SOLUTION INTRAMUSCULAR; INTRAVENOUS; SUBCUTANEOUS at 11:38

## 2020-07-22 RX ADMIN — LIDOCAINE HYDROCHLORIDE 40 MG: 20 INJECTION, SOLUTION EPIDURAL; INFILTRATION; INTRACAUDAL; PERINEURAL at 10:13

## 2020-07-22 RX ADMIN — AMLODIPINE BESYLATE 5 MG: 5 TABLET ORAL at 15:25

## 2020-07-22 RX ADMIN — ROPIVACAINE HYDROCHLORIDE 25 ML: 5 INJECTION, SOLUTION EPIDURAL; INFILTRATION; PERINEURAL at 09:15

## 2020-07-22 RX ADMIN — HYDROCODONE BITARTRATE AND ACETAMINOPHEN 1 TABLET: 7.5; 325 TABLET ORAL at 18:59

## 2020-07-22 RX ADMIN — PROPOFOL 120 MCG/KG/MIN: 10 INJECTION, EMULSION INTRAVENOUS at 10:13

## 2020-07-22 RX ADMIN — GABAPENTIN 200 MG: 100 CAPSULE ORAL at 17:42

## 2020-07-22 RX ADMIN — ACETAMINOPHEN 650 MG: 325 TABLET ORAL at 17:42

## 2020-07-22 RX ADMIN — HYDROMORPHONE HYDROCHLORIDE 0.2 MG: 2 INJECTION, SOLUTION INTRAMUSCULAR; INTRAVENOUS; SUBCUTANEOUS at 13:09

## 2020-07-22 RX ADMIN — HYDROCODONE BITARTRATE AND ACETAMINOPHEN 1 TABLET: 10; 325 TABLET ORAL at 04:21

## 2020-07-22 RX ADMIN — HYDROMORPHONE HYDROCHLORIDE 0.2 MG: 2 INJECTION, SOLUTION INTRAMUSCULAR; INTRAVENOUS; SUBCUTANEOUS at 12:51

## 2020-07-22 RX ADMIN — ACETAMINOPHEN 650 MG: 325 TABLET ORAL at 06:20

## 2020-07-22 RX ADMIN — DOCUSATE SODIUM - SENNOSIDES 2 TABLET: 50; 8.6 TABLET, FILM COATED ORAL at 17:42

## 2020-07-22 RX ADMIN — WATER 2 G: 1 INJECTION INTRAMUSCULAR; INTRAVENOUS; SUBCUTANEOUS at 15:31

## 2020-07-22 RX ADMIN — WATER 2 G: 1 INJECTION INTRAMUSCULAR; INTRAVENOUS; SUBCUTANEOUS at 23:00

## 2020-07-22 RX ADMIN — ACETAMINOPHEN 650 MG: 325 TABLET ORAL at 23:17

## 2020-07-22 RX ADMIN — MIDAZOLAM HYDROCHLORIDE 2 MG: 1 INJECTION, SOLUTION INTRAMUSCULAR; INTRAVENOUS at 09:12

## 2020-07-22 RX ADMIN — TRAZODONE HYDROCHLORIDE 50 MG: 50 TABLET ORAL at 21:11

## 2020-07-22 RX ADMIN — FENTANYL CITRATE 25 MCG: 50 INJECTION, SOLUTION INTRAMUSCULAR; INTRAVENOUS at 13:43

## 2020-07-22 RX ADMIN — LORAZEPAM 0.5 MG: 0.5 TABLET ORAL at 17:43

## 2020-07-22 RX ADMIN — HYDROMORPHONE HYDROCHLORIDE 0.2 MG: 2 INJECTION, SOLUTION INTRAMUSCULAR; INTRAVENOUS; SUBCUTANEOUS at 11:41

## 2020-07-22 RX ADMIN — HYDROMORPHONE HYDROCHLORIDE 0.2 MG: 2 INJECTION, SOLUTION INTRAMUSCULAR; INTRAVENOUS; SUBCUTANEOUS at 12:48

## 2020-07-22 RX ADMIN — Medication 10 ML: at 06:22

## 2020-07-22 RX ADMIN — Medication 1 AMPULE: at 00:21

## 2020-07-22 RX ADMIN — SODIUM CHLORIDE, SODIUM LACTATE, POTASSIUM CHLORIDE, AND CALCIUM CHLORIDE 25 ML/HR: 600; 310; 30; 20 INJECTION, SOLUTION INTRAVENOUS at 08:41

## 2020-07-22 RX ADMIN — ONDANSETRON HYDROCHLORIDE 4 MG: 2 INJECTION, SOLUTION INTRAMUSCULAR; INTRAVENOUS at 12:26

## 2020-07-22 RX ADMIN — GABAPENTIN 200 MG: 100 CAPSULE ORAL at 21:11

## 2020-07-22 RX ADMIN — SERTRALINE HYDROCHLORIDE 100 MG: 50 TABLET ORAL at 15:25

## 2020-07-22 RX ADMIN — FENTANYL CITRATE 25 MCG: 50 INJECTION, SOLUTION INTRAMUSCULAR; INTRAVENOUS at 09:12

## 2020-07-22 RX ADMIN — Medication 10 ML: at 23:00

## 2020-07-22 NOTE — PERIOP NOTES
Dr. Kaushal Torres performed a LUE block with ultrasound. Patient on continuous cardiac and pulse oximetry monitoring throughout the procedure, nasal cannula 3 liters. Patient received 2 mg, Versed and 25 mcg, Fentanyl, administered by Dr. Kaushal Torres. Vital signs stable. Patient tolerated procedure well. Patient drowsy but arouses when spoken to. Will continue to monitor.

## 2020-07-22 NOTE — PERIOP NOTES
TRANSFER - IN REPORT:    Verbal report received from [facility] [place] on Ghulam Calvert  being received from [facility] for ordered procedure      Report consisted of patients Situation, Background, Assessment and   Recommendations(SBAR). Information from the following report(s) SBAR was reviewed with the receiving nurse. Opportunity for questions and clarification was provided. Assessment completed upon patients arrival to unit and care assumed.        Message sent to transportation to  at 815

## 2020-07-22 NOTE — PERIOP NOTES
Peg Mcqueen  1950  [id number]    Situation:  Verbal report given from: 1540 Amy Lemus  Procedure: Procedure(s):  REMOVAL HARDWARE LEFT WRIST/OPEN REDUCTION INTERNAL FIXATION LEFT BOTH BONE FORARM FRACTURE (MAC/REG) (URGENT)    Background:    Preoperative diagnosis: CLOSED FRACTURE LEFT RADIUS AND ULNA    Postoperative diagnosis: CLOSED FRACTURE LEFT RADIUS AND ULNA    :  Dr. Anastasia Downey    Assistant(s): Circ-1: Maribel Breaux RN  Circ-Relief: Elizabeth Boyle RN  Scrub Tech-1: William Christopher  Scrub Tech-Relief: RT Char  Surg Asst-1: Delia Gilliland    Specimens: * No specimens in log *    Assessment:  Intra-procedure medications         Anesthesia gave intra-procedure sedation and medications, see anesthesia flow sheet     Intravenous fluids: LR@ KVO     Vital signs stable       Recommendation:    Permission to share finding with fabio

## 2020-07-22 NOTE — ANESTHESIA PROCEDURE NOTES
Peripheral Block    Start time: 7/22/2020 9:09 AM  End time: 7/22/2020 9:18 AM  Performed by: Irina Bright MD  Authorized by: Irina Bright MD       Pre-procedure:    Indications: at surgeon's request, post-op pain management and primary anesthetic    Preanesthetic Checklist: patient identified, risks and benefits discussed, site marked, timeout performed, anesthesia consent given and patient being monitored    Timeout Time: 09:11          Block Type:   Block Type:  Supraclavicular  Laterality:  Left  Monitoring:  Standard ASA monitoring, responsive to questions and oxygen  Injection Technique:  Single shot  Procedures: ultrasound guided    Patient Position: supine  Prep: chlorhexidine    Location:  Supraclavicular  Needle Type:  Stimuplex  Needle Gauge:  22 G  Needle Localization:  Ultrasound guidance    Assessment:  Number of attempts:  1  Injection Assessment:  Incremental injection every 5 mL, no paresthesia, ultrasound image on chart, local visualized surrounding nerve on ultrasound, negative aspiration for blood and no intravascular symptoms  Patient tolerance:  Patient tolerated the procedure well with no immediate complications

## 2020-07-22 NOTE — H&P
Colleen Reina is a 69 y.o. female who is being seen for L wrist deformity s/p fall today. Pt is s/p L ORIF in May with Dr. Osvaldo Chavez and has been well until today. Work up has reveled an open L distal radius/ulna fracture. Pt reports hx of CVA and nerve pain in all extremities since CVA.           Patient Active Problem List     Diagnosis Date Noted    Open fracture of left distal radius and ulna 07/20/2020    Open fracture of left distal radius 07/20/2020    Left wrist fracture 05/04/2020     No family history on file. Social History            Tobacco Use    Smoking status: Current Every Day Smoker    Smokeless tobacco: Never Used    Tobacco comment: 6-7 cigarettes a day   Substance Use Topics    Alcohol use: Yes       Alcohol/week: 1.0 standard drinks       Types: 1 Cans of beer per week       Comment: occasionally          Past Medical History:   Diagnosis Date    Arthritis       RA    Cerebral artery occlusion with cerebral infarction (HCC)       left side weakness and painful    Chronic pain       left side    Essential hypertension              Past Surgical History:   Procedure Laterality Date    HX GYN        HX KNEE REPLACEMENT Left                Prior to Admission medications    Medication Sig Start Date End Date Taking? Authorizing Provider   amLODIPine (NORVASC) 2.5 mg tablet TAKE 1 TABLET BY MOUTH ONCE A DAY FOR BLOOD PRESSURE 5/22/19   Yes Provider, Historical   naloxone (NARCAN) 4 mg/actuation nasal spray Use 1 spray intranasally, then discard. Repeat with new spray every 2 min as needed for opioid overdose symptoms, alternating nostrils.  5/9/20     Maryanne Cano PA   inFLIXimab (REMICADE) 100 mg injection by IntraVENous route every two (2) months.       Provider, Historical   folic acid (FOLVITE) 1 mg tablet Take  by mouth daily.       Provider, Historical   lisinopril (PRINIVIL, ZESTRIL) 20 mg tablet Take  by mouth daily.       Provider, Historical   methotrexate (RHEUMATREX) 2.5 mg tablet Take 2.5 mg by mouth. Taking 6 tabs once every Tuesday       Provider, Historical   sertraline (ZOLOFT) 50 mg tablet Take 100 mg by mouth daily.  Patient taking 3 tabs for depression        Provider, Historical   simvastatin (ZOCOR) 10 mg tablet Take  by mouth nightly.       Provider, Historical   traZODone (DESYREL) 50 mg tablet Take  by mouth nightly.       Provider, Historical            Current Facility-Administered Medications   Medication Dose Route Frequency    sodium chloride (NS) flush 5-40 mL  5-40 mL IntraVENous Q8H    sodium chloride (NS) flush 5-40 mL  5-40 mL IntraVENous PRN    amLODIPine (NORVASC) tablet 5 mg  5 mg Oral DAILY    [START ON 0/06/6621] folic acid (FOLVITE) tablet 1 mg  1 mg Oral DAILY    gabapentin (NEURONTIN) capsule 200 mg  200 mg Oral QID    lisinopriL (PRINIVIL, ZESTRIL) tablet 20 mg  20 mg Oral DAILY    [START ON 7/21/2020] sertraline (ZOLOFT) tablet 100 mg  100 mg Oral DAILY    [START ON 7/21/2020] atorvastatin (LIPITOR) tablet 10 mg  10 mg Oral DAILY    traZODone (DESYREL) tablet 50 mg  50 mg Oral QHS    labetaloL (NORMODYNE;TRANDATE) injection 10 mg  10 mg IntraVENous Q4H PRN    0.9% sodium chloride infusion  75 mL/hr IntraVENous CONTINUOUS    sodium chloride (NS) flush 5-40 mL  5-40 mL IntraVENous Q8H    sodium chloride (NS) flush 5-40 mL  5-40 mL IntraVENous PRN    acetaminophen (TYLENOL) tablet 650 mg  650 mg Oral Q6H    oxyCODONE IR (ROXICODONE) tablet 2.5 mg  2.5 mg Oral Q4H PRN    oxyCODONE IR (ROXICODONE) tablet 5 mg  5 mg Oral Q4H PRN    naloxone (NARCAN) injection 0.4 mg  0.4 mg IntraVENous PRN    ondansetron (ZOFRAN) injection 4 mg  4 mg IntraVENous Q6H PRN    senna-docusate (PERICOLACE) 8.6-50 mg per tablet 2 Tab  2 Tab Oral BID    ceFAZolin (ANCEF) 2 g in sterile water (preservative free) 20 mL IV syringe  2 g IntraVENous Q8H           Current Outpatient Medications   Medication Sig    amLODIPine (NORVASC) 2.5 mg tablet TAKE 1 TABLET BY MOUTH ONCE A DAY FOR BLOOD PRESSURE    naloxone (NARCAN) 4 mg/actuation nasal spray Use 1 spray intranasally, then discard. Repeat with new spray every 2 min as needed for opioid overdose symptoms, alternating nostrils.  inFLIXimab (REMICADE) 100 mg injection by IntraVENous route every two (2) months.  folic acid (FOLVITE) 1 mg tablet Take  by mouth daily.  lisinopril (PRINIVIL, ZESTRIL) 20 mg tablet Take  by mouth daily.  methotrexate (RHEUMATREX) 2.5 mg tablet Take 2.5 mg by mouth. Taking 6 tabs once every Tuesday    sertraline (ZOLOFT) 50 mg tablet Take 100 mg by mouth daily. Patient taking 3 tabs for depression     simvastatin (ZOCOR) 10 mg tablet Take  by mouth nightly.  traZODone (DESYREL) 50 mg tablet Take  by mouth nightly. No Known Allergies      Review of Systems:  A comprehensive review of systems was negative except for that written in the HPI.     Mental Status: no dementia, pt is very anxious      Objective:      Patient Vitals for the past 8 hrs:    BP Temp Pulse Resp SpO2 Height Weight   20 1600 191/90         20 1530 (!) 195/108    97 %     20 1500     93 %     20 1430 (!) 201/92    97 %     20 1415 (!) 210/97    99 %     20 1400 (!) 208/92    99 %     20 1335 194/90 97.9 °F (36.6 °C) 83 20 98 % 5' 3\" (1.6 m) 53.5 kg (118 lb)     Temp (24hrs), Av.9 °F (36.6 °C), Min:97.9 °F (36.6 °C), Max:97.9 °F (36.6 °C)       Gen: Well-developed, very anxious and crying - ? ETOH odor    Musc: LUE - + deformity of wrist with open puncture wound to the dorsum of the wrist, NVI    Skin: puncture wound as above   Neuro: Cranial nerves are grossly intact, no focal motor weakness, follows commands appropriately   Psych: oriented to person, place and time, alert     Imaging Review:INDICATION: fall, arm pain, deformity.  Left arm pain     COMPARISON: None.     FINDINGS: Two views of the left radius and ulna demonstrate acute oblique  fractures through the distal diaphyses of the radius and Shanthi. Radial fracture  extends to the screw hole within the proximal aspect of the plate from previous  fracture. Similarly the ulnar fracture is just proximal to the previous fracture  as well. There is apex dorsal angulation with adjacent soft tissue injury. Bones  are osteopenic. Significant degeneration of the first [facility] joint     IMPRESSION  IMPRESSION:   1. Acute fractures of the distal radius and ulna.     Labs:   Recent Results         Recent Results (from the past 24 hour(s))   CBC WITH AUTOMATED DIFF     Collection Time: 07/20/20  4:18 PM   Result Value Ref Range     WBC 10.1 3.6 - 11.0 K/uL     RBC 4.32 3.80 - 5.20 M/uL     HGB 14.1 11.5 - 16.0 g/dL     HCT 42.7 35.0 - 47.0 %     MCV 98.8 80.0 - 99.0 FL     MCH 32.6 26.0 - 34.0 PG     MCHC 33.0 30.0 - 36.5 g/dL     RDW 13.8 11.5 - 14.5 %     PLATELET 879 328 - 612 K/uL     MPV 8.9 8.9 - 12.9 FL     NRBC 0.0 0  WBC     ABSOLUTE NRBC 0.00 0.00 - 0.01 K/uL     NEUTROPHILS 79 (H) 32 - 75 %     LYMPHOCYTES 14 12 - 49 %     MONOCYTES 5 5 - 13 %     EOSINOPHILS 1 0 - 7 %     BASOPHILS 1 0 - 1 %     IMMATURE GRANULOCYTES 0 0.0 - 0.5 %     ABS. NEUTROPHILS 8.0 1.8 - 8.0 K/UL     ABS. LYMPHOCYTES 1.4 0.8 - 3.5 K/UL     ABS. MONOCYTES 0.5 0.0 - 1.0 K/UL     ABS. EOSINOPHILS 0.1 0.0 - 0.4 K/UL     ABS. BASOPHILS 0.1 0.0 - 0.1 K/UL     ABS. IMM.  GRANS. 0.0 0.00 - 0.04 K/UL     DF AUTOMATED     METABOLIC PANEL, COMPREHENSIVE     Collection Time: 07/20/20  4:18 PM   Result Value Ref Range     Sodium 139 136 - 145 mmol/L     Potassium 4.0 3.5 - 5.1 mmol/L     Chloride 106 97 - 108 mmol/L     CO2 27 21 - 32 mmol/L     Anion gap 6 5 - 15 mmol/L     Glucose 99 65 - 100 mg/dL     BUN 13 6 - 20 MG/DL     Creatinine 0.88 0.55 - 1.02 MG/DL     BUN/Creatinine ratio 15 12 - 20       GFR est AA >60 >60 ml/min/1.73m2     GFR est non-AA >60 >60 ml/min/1.73m2     Calcium 9.3 8.5 - 10.1 MG/DL     Bilirubin, total 0.3 0.2 - 1.0 MG/DL     ALT (SGPT) 22 12 - 78 U/L     AST (SGOT) 20 15 - 37 U/L     Alk.  phosphatase 114 45 - 117 U/L     Protein, total 8.2 6.4 - 8.2 g/dL     Albumin 4.5 3.5 - 5.0 g/dL     Globulin 3.7 2.0 - 4.0 g/dL     A-G Ratio 1.2 1.1 - 2.2     PROTHROMBIN TIME + INR     Collection Time: 07/20/20  4:18 PM   Result Value Ref Range     INR 1.0 0.9 - 1.1       Prothrombin time 10.7 9.0 - 11.1 sec   PTT     Collection Time: 07/20/20  4:18 PM   Result Value Ref Range     aPTT 25.1 22.1 - 32.0 sec     aPTT, therapeutic range     58.0 - 77.0 SECS   URINALYSIS W/ REFLEX CULTURE     Collection Time: 07/20/20  4:18 PM     Specimen: Urine   Result Value Ref Range     Color YELLOW/STRAW       Appearance CLEAR CLEAR       Specific gravity 1.012 1.003 - 1.030       pH (UA) 7.5 5.0 - 8.0       Protein Negative NEG mg/dL     Glucose Negative NEG mg/dL     Ketone Negative NEG mg/dL     Bilirubin Negative NEG       Blood Negative NEG       Urobilinogen 0.2 0.2 - 1.0 EU/dL     Nitrites Negative NEG       Leukocyte Esterase TRACE (A) NEG       WBC 0-4 0 - 4 /hpf     RBC 0-5 0 - 5 /hpf     Epithelial cells FEW FEW /lpf     Bacteria Negative NEG /hpf     UA:UC IF INDICATED CULTURE NOT INDICATED BY UA RESULT CNI       Hyaline cast 0-2 0 - 5 /lpf   DRUG SCREEN, URINE     Collection Time: 07/20/20  4:18 PM   Result Value Ref Range     AMPHETAMINES Negative NEG       BARBITURATES Negative NEG       BENZODIAZEPINES Negative NEG       COCAINE Negative NEG       METHADONE Negative NEG       OPIATES Negative NEG       PCP(PHENCYCLIDINE) Negative NEG       THC (TH-CANNABINOL) Negative NEG       Drug screen comment (NOTE)                Impression:           Patient Active Problem List     Diagnosis Date Noted    Open fracture of left distal radius and ulna 07/20/2020    Open fracture of left distal radius 07/20/2020    Left wrist fracture 05/04/2020     Principal Problem:    Open fracture of left distal radius and ulna (7/20/2020)     Active Problems:    Open fracture of left distal radius (7/20/2020)          Plan:   -  Plan for ORIF of open L wrist fracture with Dr. Lindalee Canavan on 7/22  -   hospitalist have been consulted for pre op clearance and Medical management,  Urine drug screen negative with ETOH level pending, IVF, pain management  -  DVT Prophylaxis - SCDs pre op  -  Admit to [facility] with IV ancef every 8 hours

## 2020-07-22 NOTE — PERIOP NOTES
TRANSFER - OUT REPORT:    Verbal report given to MARI Hoffmann RN(name) on Eliazar Heading  being transferred to [facility] [phone] for routine post - op       Report consisted of patients Situation, Background, Assessment and   Recommendations(SBAR). Information from the following report(s) OR Summary and Intake/Output was reviewed with the receiving nurse. Opportunity for questions and clarification was provided.       Patient transported with:   O2 @ 3 liters

## 2020-07-22 NOTE — BRIEF OP NOTE
Brief Postoperative Note    Patient: Tammi Portillo  YOB: 1950  MRN: [id number]    Date of Procedure: 7/22/2020     Pre-Op Diagnosis: CLOSED FRACTURE LEFT RADIUS AND ULNA    Post-Op Diagnosis: Same as preoperative diagnosis. Procedure(s):  REMOVAL HARDWARE LEFT WRIST/OPEN REDUCTION INTERNAL FIXATION LEFT BOTH BONE FORARM FRACTURE (MAC/REG) (URGENT)    Surgeon(s):  Suzy Gresham MD    Surgical Assistant: None    Anesthesia: MAC     Estimated Blood Loss (mL): less than 178     Complications: None    Specimens: * No specimens in log *     Implants:   Implant Name Type Inv.  Item Serial No.  Lot No. LRB No. Used Action   SCR LONI LCK 3.5X11MM - SNA  SCR LONI LCK 3.5X11MM NA SKELETAL DYNAMICS NA Left 2 Implanted   SCREW CORTCL LK 3.5X12MM - SNA  SCREW CORTCL LK 3.5X12MM NA SKELETAL [employer]YNAMICS NA Left 2 Implanted   SCREW CORTCL NON LK 3.5X10MM - SNA  SCREW CORTCL NON LK 3.5X10MM NA SKELETAL [employer]YNAMICS NA Left 1 Implanted   SCR LONI NLCK 3.5X12MM -- GEMINUS - SNA  SCR LONI NLCK 3.5X12MM -- GEMINUS NA SKELETAL DYNAMICS NA Left 1 Implanted   PEG SMTH LK 2.0X18MM - SNA  PEG SMTH LK 2.0X18MM NA SKELETAL DYNAMICS NA Left 1 Implanted   PEG WRIST THRD LK 2.3X16MM - SNA  PEG WRIST THRD LK 2.3X16MM NA SKELETAL DYNAM[employer] NA Left 2 Implanted   PEG LCK THRD 2.3X18MM - SNA  PEG LCK THRD 2.3X18MM NA SKELETAL DYNAMICS NA Left 2 Implanted   SCREW LACI LK 2.5X16MM COCR - SNA  SCREW LACI LK 2.5X16MM COCR NA SKELETAL DYNAMICS NA Left 1 Implanted   PEG THRD LK 2.3X12MM - SNA  PEG THRD LK 2.3X12MM NA SKELETAL DYNAMICS NA Left 2 Implanted   PEG THRD NON LK 2.7X12MM - SNA  PEG THRD NON LK 2.7X12MM NA SKELETAL DYNAMICS NA Left 1 Implanted   distal ulna plate   NA  NA Left 1 Implanted   [employer] Valerie DBM 0.5cc   [id number]  N/A Left 1 Implanted   PEG WRIST THRD LK 2.3X14MM - [id number] Screw PEG WRIST THRD LK 2.3X14MM  SKELETAL DYNAMICS  Left 2 Implanted   geminus plate 232 mm    NA  NA Left 1 Implanted Drains: * No LDAs found *    Findings: as expected    Electronically Signed by Gaby Lees MD on 7/22/2020 at 12:40 PM

## 2020-07-22 NOTE — PERIOP NOTES
1303  Pt received in pacu eyes closed     1307  opens eyes sobbing  about phone. Attempt to talk to patient and reassure. 1318  Continues to sob intermittently. Given 20 mg propofol by Alee Fong CRNA    1320  Pt sleeping, snoring respirations, HOB elevated, Snoring ceased. Respirations even , unlabored. 1339 Pt crying out its all my fault, sobbing. Attempt to console. States left side including face, leg, foothurting 9/10    1343  Medicate with fentanyl 25 mcq IV. Pt calmer, no longer crying out. Chronic left leg, foot and face pain from old cva 9/10    1351 States stress is a lot better and pain 5/10.   Taking sips of gingerale

## 2020-07-22 NOTE — PROGRESS NOTES
Physical Therapy Screening:    An [facility] screening referral was triggered for physical therapy based on results obtained during the nursing admission assessment. The patients chart was reviewed and the patient is appropriate for a skilled therapy evaluation if there is a decline in functional mobility from baseline. Please order a consult for physical therapy if you are in agreement and would like an evaluation to be completed. Thank you.     Summer Saldivar, PT, DPT

## 2020-07-22 NOTE — OP NOTES
PATIENT NAME:  Dylon Caro     SURGEON:    Marlene Carmona MD     DATE OF SURGERY: 7/22/2020      [facility]      PREOPERATIVE DIAGNOSIS: Open  Grade 1 displaced Periprosthetic left radius and ulnar shaft fractures     POSTOPERATIVE DIAGNOSIS:  Same     PROCEDURE:   Left forearm irrigation and debridement of open fracture, left wrist removal of hardware. Left radius and ulnar shaft open reduction internal fixation.     IMPLANTS:   Skeletal Dynamics long volar radius plate, skeletal Dynamics distal ulnar shaft plate. DBX bone putty     ANESTHESIA: Brachial Plexus block/IV sedation      BLOOD LOSS:  Minimal     COMPLICATIONS: none      TOURNIQUET TIME:    96 minutes       OPERATIVE INDICATIONS:They had sustained an open periprosthetic  Radius and ulnar shaft fracture with alignment that was unacceptable for non operative management. They therefore indicated for surgery. After risks benefits alternatives were discussed with the patient they consented to proceed.     DESCRIPTION  OF PROCEDURE:   On the date of operation the patient presented stable to the holding area. The correct upper extremity was identified and marked. Regional anesthesia was induced by the anesthesia team.  They were then brought to the operating room and placed supine with the operative upper extremity on a hand table and a nonsterile upper arm tourniquet placed. The upper extremity was then prepped and draped in the standard sterile fashion. After formal time-out was performed the upper extremity was elevated and exsanguinated and the upper arm tourniquet was inflated to 250 mm of mercury.      her prior volar Yen Radha approach to the wrist was utilized. This was extended proximally past the fracture site into the proximal forearm. .  Skin and subcutaneous tissue were taken down sharply and the FCR was visualized.   The FCR sheath was then carefully incised of scar tissue and this was retracted ulnarly. The flexor pollicis longus was also retracted ulnarly. This was then taken proximally utilizing the interval between the pronator teres and the brachioradialis. The plate was then visualized. The prior skeletal Dynamics Ria plate was then removed without complication. The fracture site at the radial shaft was immediately proximal to the plate. It was a transverse fracture. The fracture sites were then carefully visualized. Fracture sites were debrided. Fracture sites were then reduced   With reduction clamps. The long Genesisplate was then applied to the volar surface of the distal radius. K-wires were used to secure this and fluoroscopic imaging demonstrated a good position. Cortical screws were then placed on either side of the reduced fracture. Fluoroscopic imaging was performed which demonstrated good reduction of the fracture and appropriate hardware placement. The remainder of the cortical shaft screws were placed. The majority of these were placed in a locking fashion due to poor bone quality. We then moved distally to the distal locking screws. These were placed in a locking fashion. X-ray images demonstrated good alignment of the fracture and appropriate hardware placement. Attention was then turned towards the ulnar shaft. Incision was made along the subcutaneous border of the ulna both proximal and distal to the fracture site. Skin and subcutaneous tissue was taken down sharply. The fascia overlying the interval between the ECU and the FCU was then incised. The ECU and the FCU were then carefully retracted off of the distal ulna. Of note the prior distal ulnar fracture was carefully exposed and noted to be well healed. It was healed, however in a nonanatomic fashion. The acute fracture was then visualized and debrided.  Copious irrigation was performed and there was sharp (knife) and blunt (ronguer) debridement of the bone due to the open fracture nature of this fracture. This was reduced. The skeletal Dynamics  Distal ulnar shaft plate was then applied. K-wires were placed for preliminary fixation. Fluoroscopic imaging demonstrated appropriate reduction of the fracture and hardware placement. The initial distal locking screws were then placed in the ulnar head. Fine reduction of the fracture was then performed and the shaft screws were placed in the proximal portion of the ulna. All screw holes were filled in a stable fashion. Good reduction of the fracture was noted however due to the prior fracture this was not completely anatomic fracture. There was some bone loss. Final x-rays were obtained. Tourniquet was then taken down. Copious irrigation was performed and all bleeding sites were carefully coagulated with bipolar cautery. DBX bone putty was placed at the ulnar shaft fracture due to the bone loss. The interval between the ECU and the FCU was loosely approximated. Skin was closed with 3 0 nylon in a interrupted horizontal mattress fashion. Patient was then placed in sterile bulky dressing followed by a  long arm splint.   They were then taken stable to the recovery room with all instrument, needle and lap counts correct at the end of the case.

## 2020-07-22 NOTE — ANESTHESIA PREPROCEDURE EVALUATION
Relevant Problems   No relevant active problems       Anesthetic History   No history of anesthetic complications            Review of Systems / Medical History  Patient summary reviewed, nursing notes reviewed and pertinent labs reviewed    Pulmonary          Smoker         Neuro/Psych       CVA (LUE & LLE weakness and pain )  TIA     Cardiovascular    Hypertension              Exercise tolerance: >4 METS  Comments: 06/19 Stress Test negative    06/19 ECHO= EF 61-65%, grade 1 dd   GI/Hepatic/Renal  Within defined limits              Endo/Other        Arthritis (Rheumatoid)     Other Findings   Comments: Fractured left distal radius & ulna    Chronic left side pain         Physical Exam    Airway  Mallampati: II  TM Distance: 4 - 6 cm  Neck ROM: normal range of motion   Mouth opening: Normal     Cardiovascular    Rhythm: regular  Rate: normal         Dental  No notable dental hx       Pulmonary  Breath sounds clear to auscultation               Abdominal  GI exam deferred       Other Findings            Anesthetic Plan    ASA: 3  Anesthesia type: regional, MAC and general - backup - supraclavicular block      Post-op pain plan if not by surgeon: peripheral nerve block single    Induction: Intravenous  Anesthetic plan and risks discussed with: Patient

## 2020-07-22 NOTE — ANESTHESIA POSTPROCEDURE EVALUATION
Procedure(s):  REMOVAL HARDWARE LEFT WRIST/OPEN REDUCTION INTERNAL FIXATION LEFT BOTH BONE FORARM FRACTURE (MAC/REG) (URGENT). total IV anesthesia, regional, MAC    Anesthesia Post Evaluation      Multimodal analgesia: multimodal analgesia used between 6 hours prior to anesthesia start to PACU discharge  Patient location during evaluation: PACU  Patient participation: complete - patient participated  Level of consciousness: awake  Pain score: 0  Airway patency: patent  Anesthetic complications: no  Cardiovascular status: acceptable  Respiratory status: acceptable  Hydration status: acceptable  Comments: Pt has supraclavicular block; sling postop until block resolves. Inpatient. Complaining of left leg pain only, which is chronic. Post anesthesia nausea and vomiting:  none  Final Post Anesthesia Temperature Assessment:  Normothermia (36.0-37.5 degrees C)      INITIAL Post-op Vital signs:   Vitals Value Taken Time   /87 7/22/2020  2:14 PM   Temp 36.6 °C (97.8 °F) 7/22/2020  1:20 PM   Pulse 97 7/22/2020  2:16 PM   Resp 11 7/22/2020  2:16 PM   SpO2 94 % 7/22/2020  2:16 PM   Vitals shown include unvalidated device data.

## 2020-07-22 NOTE — PROGRESS NOTES
Bedside and Verbal shift change report given to Sonja PAGAN (oncoming nurse) by [address] [place] [address] Avenue (offgoing nurse). Report included the following information SBAR, Kardex, Procedure Summary, Intake/Output, MAR, Accordion and Recent Results.

## 2020-07-22 NOTE — PROGRESS NOTES
Hospitalist Progress Note    NAME: Pricilla Parrish   :  1950   MRN:  [id number]     Interim Hospital Summary: 69 y.o. female whom presented on 2020 with      Assessment / Plan:  Acute fractures of Left distal radius and ulna  -pain control, DVT prophylaxis per ortho  -Planned surgery today     Hypertensive urgency likely from pain  -Bp is now controlled  -Continue with Norvasc and lisinopril    History of rheumatoid arthritis  Depression  Dyslipidemia  History of CVA with residual left-sided weakness  -On Remicade and methotrexate at home. Continue folic acid  -Continue home Zoloft and trazodone  -Continue home statin  -Resume PT OT after procedure    18.5 - 24.9 Normal weight / Body mass index is 19.84 kg/m². Code status: Full  Prophylaxis: per primary  Recommended Disposition: TBD       Subjective:     Chief Complaint / Reason for Physician Visit  Follow up of fall with left wrist fracture  Anxious today. Reports that wrist pain is controlled      Review of Systems:  Symptom Y/N Comments  Symptom Y/N Comments   Fever/Chills    Chest Pain     Poor Appetite    Edema     Cough    Abdominal Pain     Sputum    Joint Pain X    SOB/GONZALEZ    Pruritis/Rash     Nausea/vomit    Tolerating PT/OT     Diarrhea    Tolerating Diet     Constipation    Other       Could NOT obtain due to:      PO intake: No data found. Objective:     VITALS:   Last 24hrs VS reviewed since prior progress note.  Most recent are:  Patient Vitals for the past 24 hrs:   Temp Pulse Resp BP SpO2   20 1519 98.1 °F (36.7 °C) 86 18 143/85 96 %   20 1453 97.4 °F (36.3 °C) 90 18 (!) 131/91 97 %   20 1414  95 17 154/87 92 %   20 1400  97 18 158/88 95 %   20 1330  92 14 (!) 179/103 95 %   20 1320 97.8 °F (36.6 °C) 92 8 (!) 159/103 96 %   20 1315  92 14 (!) 169/96 97 %   20 1310  89 12 (!) 166/137 97 %   20 1308 97.8 °F (36.6 °C) 85 22 (!) 179/92 96 %   07/22/20 1306  94 17 (!) 179/92 94 %   07/22/20 0930  74 12 118/76 100 %   07/22/20 0925  74 10 113/74 100 %   07/22/20 0920  78 12 131/73 100 %   07/22/20 0817 98.4 °F (36.9 °C) 71 18 137/81 99 %   07/22/20 0744 97.8 °F (36.6 °C) 80 18 143/73 94 %   07/22/20 0314 97.5 °F (36.4 °C) 65 16 155/74 93 %   07/22/20 0000 97.8 °F (36.6 °C) 77 16 140/69 95 %   07/21/20 1940 97.8 °F (36.6 °C) 76 16 146/80 94 %   07/21/20 1616 98.3 °F (36.8 °C) 75 16 121/78 94 %       Intake/Output Summary (Last 24 hours) at 7/22/2020 1526  Last data filed at 7/22/2020 1456  Gross per 24 hour   Intake 1570 ml   Output 650 ml   Net 920 ml        PHYSICAL EXAM:  General:Alert, cooperative, no acute distress    EENT:  EOMI. Anicteric sclerae. MMM  Resp:  CTA bilaterally, no wheezing or rales. No accessory muscle use  CV:  Regular  rhythm,  No edema  GI:  Soft, Non distended, Non tender.  +Bowel sounds  Neurologic:  Alert and oriented X 3, normal speech,   Psych:   anxious  Skin:  No rashes. No jaundice  MSK - left Wrist in cast    Reviewed most current lab test results and cultures  YES  Reviewed most current radiology test results   YES  Review and summation of old records today    NO  Reviewed patient's current orders and MAR    YES  PMH/ reviewed - no change compared to H&P  ________________________________________________________________________  Care Plan discussed with:    Comments   Patient X    Family      RN X    Care Manager     Consultant                        Multidiciplinary team rounds were held today with , nursing, pharmacist and clinical coordinator. Patient's plan of care was discussed; medications were reviewed and discharge planning was addressed.      ________________________________________________________________________  Total NON critical care TIME:   35   Minutes    Total CRITICAL CARE TIME Spent:   Minutes non procedure based      Comments   >50% of visit spent in counseling and coordination of care x     This includes time during multidisciplinary rounds if indicated above   ________________________________________________________________________  Jose Thakur MD     Procedures: see electronic medical records for all procedures/Xrays and details which were not copied into this note but were reviewed prior to creation of Plan. LABS:  I reviewed today's most current labs and imaging studies.   Pertinent labs include:  Recent Labs     07/21/20  0435 07/20/20  1618   WBC 8.1 10.1   HGB 13.1 14.1   HCT 39.4 42.7    276     Recent Labs     07/21/20  0435 07/20/20  1618   NA  --  139   K  --  4.0   CL  --  106   CO2  --  27   GLU  --  99   BUN  --  13   CREA  --  0.88   CA  --  9.3   ALB  --  4.5   TBILI  --  0.3   ALT  --  22   INR 1.0 1.0

## 2020-07-23 PROCEDURE — 74011250637 HC RX REV CODE- 250/637: Performed by: ORTHOPAEDIC SURGERY

## 2020-07-23 PROCEDURE — 65270000029 HC RM PRIVATE

## 2020-07-23 PROCEDURE — 94760 N-INVAS EAR/PLS OXIMETRY 1: CPT

## 2020-07-23 PROCEDURE — 74011250636 HC RX REV CODE- 250/636: Performed by: INTERNAL MEDICINE

## 2020-07-23 PROCEDURE — 74011000250 HC RX REV CODE- 250: Performed by: ORTHOPAEDIC SURGERY

## 2020-07-23 PROCEDURE — 74011250637 HC RX REV CODE- 250/637: Performed by: INTERNAL MEDICINE

## 2020-07-23 PROCEDURE — 74011250636 HC RX REV CODE- 250/636: Performed by: ORTHOPAEDIC SURGERY

## 2020-07-23 PROCEDURE — 77010033678 HC OXYGEN DAILY

## 2020-07-23 PROCEDURE — 74011250637 HC RX REV CODE- 250/637: Performed by: PHYSICIAN ASSISTANT

## 2020-07-23 RX ORDER — AMLODIPINE BESYLATE 5 MG/1
10 TABLET ORAL DAILY
Status: DISCONTINUED | OUTPATIENT
Start: 2020-07-23 | End: 2020-07-24 | Stop reason: HOSPADM

## 2020-07-23 RX ORDER — ENOXAPARIN SODIUM 100 MG/ML
30 INJECTION SUBCUTANEOUS EVERY 24 HOURS
Status: DISCONTINUED | OUTPATIENT
Start: 2020-07-23 | End: 2020-07-24 | Stop reason: HOSPADM

## 2020-07-23 RX ADMIN — FOLIC ACID 1 MG: 1 TABLET ORAL at 08:12

## 2020-07-23 RX ADMIN — LISINOPRIL 20 MG: 20 TABLET ORAL at 08:11

## 2020-07-23 RX ADMIN — Medication 10 ML: at 23:04

## 2020-07-23 RX ADMIN — SERTRALINE HYDROCHLORIDE 100 MG: 50 TABLET ORAL at 08:12

## 2020-07-23 RX ADMIN — Medication 10 ML: at 06:23

## 2020-07-23 RX ADMIN — HYDROCODONE BITARTRATE AND ACETAMINOPHEN 1 TABLET: 7.5; 325 TABLET ORAL at 23:03

## 2020-07-23 RX ADMIN — TRAZODONE HYDROCHLORIDE 50 MG: 50 TABLET ORAL at 23:03

## 2020-07-23 RX ADMIN — HYDROCODONE BITARTRATE AND ACETAMINOPHEN 1 TABLET: 7.5; 325 TABLET ORAL at 16:59

## 2020-07-23 RX ADMIN — ACETAMINOPHEN 650 MG: 325 TABLET ORAL at 06:22

## 2020-07-23 RX ADMIN — Medication 10 ML: at 23:05

## 2020-07-23 RX ADMIN — ATORVASTATIN CALCIUM 10 MG: 10 TABLET, FILM COATED ORAL at 08:12

## 2020-07-23 RX ADMIN — WATER 2 G: 1 INJECTION INTRAMUSCULAR; INTRAVENOUS; SUBCUTANEOUS at 06:22

## 2020-07-23 RX ADMIN — HYDROCODONE BITARTRATE AND ACETAMINOPHEN 1 TABLET: 7.5; 325 TABLET ORAL at 04:47

## 2020-07-23 RX ADMIN — Medication 10 ML: at 14:00

## 2020-07-23 RX ADMIN — AMLODIPINE BESYLATE 10 MG: 5 TABLET ORAL at 08:12

## 2020-07-23 RX ADMIN — ACETAMINOPHEN 650 MG: 325 TABLET ORAL at 12:38

## 2020-07-23 RX ADMIN — GABAPENTIN 200 MG: 100 CAPSULE ORAL at 17:00

## 2020-07-23 RX ADMIN — GABAPENTIN 200 MG: 100 CAPSULE ORAL at 12:38

## 2020-07-23 RX ADMIN — DOCUSATE SODIUM - SENNOSIDES 2 TABLET: 50; 8.6 TABLET, FILM COATED ORAL at 08:12

## 2020-07-23 RX ADMIN — LORAZEPAM 0.5 MG: 0.5 TABLET ORAL at 00:38

## 2020-07-23 RX ADMIN — ENOXAPARIN SODIUM 30 MG: 30 INJECTION SUBCUTANEOUS at 08:11

## 2020-07-23 RX ADMIN — GABAPENTIN 200 MG: 100 CAPSULE ORAL at 08:12

## 2020-07-23 RX ADMIN — HYDROCODONE BITARTRATE AND ACETAMINOPHEN 1 TABLET: 7.5; 325 TABLET ORAL at 10:17

## 2020-07-23 RX ADMIN — DOCUSATE SODIUM - SENNOSIDES 2 TABLET: 50; 8.6 TABLET, FILM COATED ORAL at 17:00

## 2020-07-23 RX ADMIN — GABAPENTIN 200 MG: 100 CAPSULE ORAL at 23:03

## 2020-07-23 NOTE — PROGRESS NOTES
CM acknowledged patient does not have therapy orders. MAKEDA spoke to MD who reported he would place orders.      Candy Hernandez, [address]

## 2020-07-23 NOTE — PROGRESS NOTES
Hospitalist Progress Note    NAME: Miguel A Wasserman   :  1950   MRN:  [id number]     Interim Hospital Summary: 69 y.o. female whom presented on 2020 with      Assessment / Plan:  Acute fractures of Left distal radius and ulna s/p ORIF on   -on norco for pain and Lovenox for dvt px   -Post op care per ortho  -consult pt/ot  -pt wants to go to home regardless but pt's friend wants her to go to rehab     Hypertensive urgency likely from pain  -Bp is now controlled  -Continue  Lisinopril, increase Norvasc to 10 mg    History of rheumatoid arthritis  Depression  Dyslipidemia  History of CVA with residual left-sided weakness  -On Remicade and methotrexate at home. Continue folic acid  -Continue home Zoloft and trazodone  -Continue home statin      Dispo: Per ortho     18.5 - 24.9 Normal weight / Body mass index is 19.84 kg/m². Code status: Full  Prophylaxis: Lovenox  Recommended Disposition: TBD       Subjective:     Chief Complaint / Reason for Physician Visit  Follow up of fall with left wrist fracture  Anxious today. Reports that wrist pain is controlled      Review of Systems:  Symptom Y/N Comments  Symptom Y/N Comments   Fever/Chills    Chest Pain     Poor Appetite    Edema     Cough    Abdominal Pain     Sputum    Joint Pain X    SOB/GONZALEZ    Pruritis/Rash     Nausea/vomit    Tolerating PT/OT     Diarrhea    Tolerating Diet     Constipation    Other       Could NOT obtain due to:      PO intake: No data found. Objective:     VITALS:   Last 24hrs VS reviewed since prior progress note.  Most recent are:  Patient Vitals for the past 24 hrs:   Temp Pulse Resp BP SpO2   20 1111 98.7 °F (37.1 °C) 96 18 121/78 95 %   20 0930    119/63    20 0731 98.3 °F (36.8 °C) 97 18 (!) 166/105 94 %   20 0330 98.6 °F (37 °C) 82 18 168/89 96 %   20 2340 98.5 °F (36.9 °C) 82 18 138/82 94 %   20 2110 98.6 °F (37 °C) 83 18 138/83  07/22/20 1715 98.7 °F (37.1 °C) 85 20 (!) 142/96 93 %   07/22/20 1615 98 °F (36.7 °C) 84 18 (!) 146/95 96 %       Intake/Output Summary (Last 24 hours) at 7/23/2020 1554  Last data filed at 7/23/2020 0509  Gross per 24 hour   Intake    Output 650 ml   Net -650 ml        PHYSICAL EXAM:  General:Alert, cooperative, no acute distress    EENT:  EOMI. Anicteric sclerae. MMM  Resp:  CTA bilaterally, no wheezing or rales. No accessory muscle use  CV:  Regular  rhythm,  No edema  GI:  Soft, Non distended, Non tender.  +Bowel sounds  Neurologic:  Alert and oriented X 3, normal speech,   Psych:   anxious  Skin:  No rashes. No jaundice  MSK - left Wrist in cast    Reviewed most current lab test results and cultures  YES  Reviewed most current radiology test results   YES  Review and summation of old records today    NO  Reviewed patient's current orders and MAR    YES  PMH/ reviewed - no change compared to H&P  ________________________________________________________________________  Care Plan discussed with:    Comments   Patient X    Family      RN X    Care Manager     Consultant                        Multidiciplinary team rounds were held today with , nursing, pharmacist and clinical coordinator. Patient's plan of care was discussed; medications were reviewed and discharge planning was addressed. ________________________________________________________________________  Total NON critical care TIME:   35   Minutes    Total CRITICAL CARE TIME Spent:   Minutes non procedure based      Comments   >50% of visit spent in counseling and coordination of care x     This includes time during multidisciplinary rounds if indicated above   ________________________________________________________________________  Jose Thakur MD     Procedures: see electronic medical records for all procedures/Xrays and details which were not copied into this note but were reviewed prior to creation of Plan.       LABS:  I reviewed today's most current labs and imaging studies.   Pertinent labs include:  Recent Labs     07/21/20 0435 07/20/20  1618   WBC 8.1 10.1   HGB 13.1 14.1   HCT 39.4 42.7    276     Recent Labs     07/21/20 0435 07/20/20  1618   NA  --  139   K  --  4.0   CL  --  106   CO2  --  27   GLU  --  99   BUN  --  13   CREA  --  0.88   CA  --  9.3   ALB  --  4.5   TBILI  --  0.3   ALT  --  22   INR 1.0 1.0

## 2020-07-23 NOTE — PROGRESS NOTES
Bedside and Verbal shift change report given to Sonja PAGAN (oncoming nurse) by Raliegh Mcardle (offgoing nurse). Report included the following information SBAR, Kardex, Procedure Summary, Intake/Output, MAR, Accordion and Recent Results.

## 2020-07-23 NOTE — PROGRESS NOTES
VICTORIA  1) patient requesting to return home with home health   2) friend to provide transportation at d/c    Reason for Admission:  Left distal radius fracture                    RUR Score: 12%                    Plan for utilizing home health: per recommendation          PCP: First and Last name:  Dr. Meghan Shaw   Name of Practice: [facility]   Are you a current patient: Yes/No: yes   Approximate date of last visit: 2 wk ago    Can you participate in a virtual visit with your PCP: yes                    Current Advanced Directive/Advance Care Plan: patient does not have an ACP                         Transition of Care Plan:                      CM made room visit with patient who was alert and oriented with friend, Godfrey Umanzor, at bedside. Patient confirmed demographics, insurance, and emergency contact on file. Patient uses [facility]. [employer] assist with arranging pills in pill box weekly. Patient resides alone in a single level home with 4 FRANCI. Patient's friend Godfrey Umanzor lives nearby and is patients only support locally. Patient has sisters that live out of town and a son that lives in [place] but they are estranged. At baseline patient is independent with ADLs and driving. Patient has used [facility] in the past but unsure of agency used and has also been to SNF at [facility]. Patient refuses to ever go back to another rehab facility due to past experience. Patient's plan is to return home. CM requested to see how she does with therapy prior to making final determination. Patient's friend has concerns regarding patient staying alone but reported she was unable to be with friend 24/7. CM spoke to MD and ortho PA who agreed for PT/OT to see patient. CM attempted to place orders however patient still has active bedrest order. CM informed MD who reported he would take out bedrest order and placed therapy orders.      Care Management Interventions  PCP Verified by CM: Yes(last seen 2 wk ago )  Mode of Transport at Discharge:  Other (see comment)(friend)  Transition of Care Consult (CM Consult): Discharge Planning  Discharge Durable Medical Equipment: No  Physical Therapy Consult: Yes  Occupational Therapy Consult: Yes  Speech Therapy Consult: No  Current Support Network: Lives Alone, Own Home, Other(patient resides alone with friend nearby)  Confirm Follow Up Transport: Friends  Discharge Location  Discharge Placement: Unable to determine at this time    Maggi Salmeron, [address]

## 2020-07-23 NOTE — PROGRESS NOTES
Patient is very set on going home today however, friend does not feel like it's a good idea. She stated that she is worried about patient's ability to be at home alone and take care of herself. Contacted Dr. Laura Carranza and Jody MARSH about concerns.

## 2020-07-23 NOTE — PROGRESS NOTES
ORTHO - Progress Note  Post Op day: 1 Day 48 Tiffanie Morton     [id number]  female    69 y.o.    1950    Admit date: 2020  Date of Surgery: 2020   Procedures: Procedure(s):REMOVAL HARDWARE LEFT WRIST/OPEN REDUCTION INTERNAL FIXATION LEFT BOTH BONE FORARM FRACTURE (MAC/REG) (URGENT)  Surgeon:  Shan John) and Role: * Vannesa Henning MD - Primary        SUBJECTIVE:     Gelacio Anderson is a 69 y.o. female s/p a REMOVAL HARDWARE LEFT WRIST/OPEN REDUCTION INTERNAL FIXATION LEFT BOTH BONE FORARM FRACTURE  resting in the bed. Patient has complaints of appropriate post-op pain, tolerating PO pain medications. Denies F/C, nausea, vomiting, dizziness, lightheadedness, chest pain, or shortness of breath. States she has chronic issues, difficulty walking, chronic nerve pain. States she doesn't think she can feed herself. States she would like to stay an additional night in the hospital.       OBJECTIVE:       Physical Exam:  General: alert, cooperative, no distress. Gastrointestinal:  non-distended . Cardiovascular: Brisk cap refill in all distal extremities   Genitourinary: Voiding independently   Respiratory: No respiratory distress   Neurological:Neurovascular exam within normal limits. Senstion intact: LUE bilat. Motor: + finger flex/ext   Musculoskeletal: ++ finger swellings.   Dressing/Wound:  Short arm splint intact - ace adjusted distally - NOT too tight  Vital Signs:         Patient Vitals for the past 8 hrs:   BP Temp Pulse Resp SpO2   20 1601 129/87 98 °F (36.7 °C) 88 18 96 %   20 1111 121/78 98.7 °F (37.1 °C) 96 18 95 %   20 0930 119/63                                              Temp (24hrs), Av.5 °F (36.9 °C), Min:98 °F (36.7 °C), Max:98.7 °F (37.1 °C)      Labs:        Recent Labs     20  0435   HCT 39.4   HGB 13.1   INR 1.0     PT/OT:              ASSESSMENT / PLAN:   Principal Problem:    Open fracture of left distal radius and ulna (7/20/2020)    Active Problems:    Open fracture of left distal radius (7/20/2020)       No ortho concerns at this time  Agressive elevation!!!!  Encouraged AROM of the digits  PT/OT for possible placement     Signed By: Vita Pike PA-C

## 2020-07-23 NOTE — PROGRESS NOTES
Bedside and Verbal shift change report given to John Shen (oncoming nurse) by Bill Rasmussen (offgoing nurse). Report included the following information SBAR, Kardex, MAR and Med Rec Status.

## 2020-07-24 VITALS
RESPIRATION RATE: 16 BRPM | SYSTOLIC BLOOD PRESSURE: 114 MMHG | WEIGHT: 112 LBS | DIASTOLIC BLOOD PRESSURE: 74 MMHG | HEIGHT: 63 IN | OXYGEN SATURATION: 93 % | TEMPERATURE: 98.1 F | HEART RATE: 88 BPM | BODY MASS INDEX: 19.84 KG/M2

## 2020-07-24 LAB
ERYTHROCYTE [DISTWIDTH] IN BLOOD BY AUTOMATED COUNT: 13.8 % (ref 11.5–14.5)
HCT VFR BLD AUTO: 35.3 % (ref 35–47)
HGB BLD-MCNC: 11.4 G/DL (ref 11.5–16)
MCH RBC QN AUTO: 32.4 PG (ref 26–34)
MCHC RBC AUTO-ENTMCNC: 32.3 G/DL (ref 30–36.5)
MCV RBC AUTO: 100.3 FL (ref 80–99)
NRBC # BLD: 0 K/UL (ref 0–0.01)
NRBC BLD-RTO: 0 PER 100 WBC
PLATELET # BLD AUTO: 278 K/UL (ref 150–400)
PMV BLD AUTO: 9.2 FL (ref 8.9–12.9)
RBC # BLD AUTO: 3.52 M/UL (ref 3.8–5.2)
WBC # BLD AUTO: 8.8 K/UL (ref 3.6–11)

## 2020-07-24 PROCEDURE — 74011250637 HC RX REV CODE- 250/637: Performed by: PHYSICIAN ASSISTANT

## 2020-07-24 PROCEDURE — 74011250637 HC RX REV CODE- 250/637: Performed by: ORTHOPAEDIC SURGERY

## 2020-07-24 PROCEDURE — 36415 COLL VENOUS BLD VENIPUNCTURE: CPT

## 2020-07-24 PROCEDURE — 85027 COMPLETE CBC AUTOMATED: CPT

## 2020-07-24 PROCEDURE — 74011250637 HC RX REV CODE- 250/637: Performed by: INTERNAL MEDICINE

## 2020-07-24 PROCEDURE — 97116 GAIT TRAINING THERAPY: CPT

## 2020-07-24 PROCEDURE — 77010033678 HC OXYGEN DAILY

## 2020-07-24 PROCEDURE — 97161 PT EVAL LOW COMPLEX 20 MIN: CPT

## 2020-07-24 PROCEDURE — 94760 N-INVAS EAR/PLS OXIMETRY 1: CPT

## 2020-07-24 PROCEDURE — 74011250636 HC RX REV CODE- 250/636: Performed by: INTERNAL MEDICINE

## 2020-07-24 RX ORDER — AMLODIPINE BESYLATE 5 MG/1
5 TABLET ORAL DAILY
Qty: 30 TAB | Refills: 0 | Status: SHIPPED | OUTPATIENT
Start: 2020-07-25 | End: 2020-08-24

## 2020-07-24 RX ORDER — HYDROCODONE BITARTRATE AND ACETAMINOPHEN 7.5; 325 MG/1; MG/1
1 TABLET ORAL
Qty: 30 TAB | Refills: 0 | Status: SHIPPED | OUTPATIENT
Start: 2020-07-24 | End: 2020-07-27

## 2020-07-24 RX ADMIN — AMLODIPINE BESYLATE 10 MG: 5 TABLET ORAL at 08:32

## 2020-07-24 RX ADMIN — LISINOPRIL 20 MG: 20 TABLET ORAL at 08:32

## 2020-07-24 RX ADMIN — SERTRALINE HYDROCHLORIDE 100 MG: 50 TABLET ORAL at 08:32

## 2020-07-24 RX ADMIN — ENOXAPARIN SODIUM 30 MG: 30 INJECTION SUBCUTANEOUS at 08:32

## 2020-07-24 RX ADMIN — Medication 10 ML: at 06:41

## 2020-07-24 RX ADMIN — GABAPENTIN 200 MG: 100 CAPSULE ORAL at 08:32

## 2020-07-24 RX ADMIN — FOLIC ACID 1 MG: 1 TABLET ORAL at 08:32

## 2020-07-24 RX ADMIN — HYDROCODONE BITARTRATE AND ACETAMINOPHEN 1 TABLET: 7.5; 325 TABLET ORAL at 06:40

## 2020-07-24 RX ADMIN — ATORVASTATIN CALCIUM 10 MG: 10 TABLET, FILM COATED ORAL at 08:32

## 2020-07-24 NOTE — PROGRESS NOTES
VICTORIA  1) home with home health   2) friend to provide transportation at d/c    CM spoke to therapy who is recommending [place] services at d/c. CM made room visit with patient to discuss d/c planning. Patient agreeable with [place] services. FOC signed for at home care and [facility]. Referral sent to [facility] via [employer] and they have accepted. Patient's friend to provide transportation at d/c and will assist with patient's needs during recovery. Patient signed 2nd IM letter.      Candy South, [address] [facility]

## 2020-07-24 NOTE — PROGRESS NOTES
Problem: Mobility Impaired (Adult and Pediatric)  Goal: *Acute Goals and Plan of Care (Insert Text)  Description: FUNCTIONAL STATUS PRIOR TO ADMISSION: Pt reports independence w/ ambulation however admits to multiple falls at home. Still driving. Independent w/ ADLs. Enjoys drawing, painting. Hx of CVA with residual L sided deficits/weakness    HOME SUPPORT PRIOR TO ADMISSION: The patient lived alone with no local support. States she may be having friends \"stopping by\" to assist as needed. Physical Therapy Goals  Initiated 7/24/2020  1. Patient will move from supine to sit and sit to supine , scoot up and down, and roll side to side in bed with independence within 7 day(s). 2.  Patient will transfer from bed to chair and chair to bed with supervision/set-up using the least restrictive device within 7 day(s). 3.  Patient will perform sit to stand with supervision/set-up within 7 day(s). 4.  Patient will ambulate with supervision/set-up for 300 feet with the least restrictive device within 7 day(s). 5.  Patient will ascend/descend 4 stairs with 1 handrail(s) with supervision/set-up within 7 day(s). Outcome: Progressing Towards Goal   PHYSICAL THERAPY EVALUATION  Patient: Bogdan Blanco (81 y.o. female)  Date: 7/24/2020  Primary Diagnosis: Open fracture of left distal radius [S52.502B]  Procedure(s) (LRB):  REMOVAL HARDWARE LEFT WRIST/OPEN REDUCTION INTERNAL FIXATION LEFT BOTH BONE FORARM FRACTURE (MAC/REG) (URGENT) (Left) 2 Days Post-Op   Precautions:   NWB(LUE)    ASSESSMENT  Based on the objective data described below, the patient presents with residual L sided weakness from previous CVA, chronic pain, impaired safety awareness/decreased insight, NWB status of LUE, and overall impaired functional mobility. Pt with mildly ataxic gait pattern through LLE secondary to residual deficits from previous CVA. Minor path deviations noted with pt requiring SBAx1 during ambulation trial without device.  SBA also required as pt ascended/descended 4 steps w/ L handrail use. Given pt's fall history and impaired safety awareness, recommend HHPT w/ 24hr assist at discharge. Current Level of Function Impacting Discharge (mobility/balance): independent w/ bed mobility, supervision for sit<>stand transfer, SBA for ambulation without device    Functional Outcome Measure: The patient scored 75/100 on the Barthel Index outcome measure which is indicative of mild impairment in ADLs and functional mobility. Other factors to consider for discharge: falls history, falls risk, impaired safety awareness, limited family support     Patient will benefit from skilled therapy intervention to address the above noted impairments. PLAN :  Recommendations and Planned Interventions: bed mobility training, transfer training, gait training, therapeutic exercises, patient and family training/education, and therapeutic activities      Frequency/Duration: Patient will be followed by physical therapy:  5 times a week to address goals. Recommendation for discharge: (in order for the patient to meet his/her long term goals)  Physical therapy at least 2 days/week in the home AND ensure assist and/or supervision for safety with functional mobility and ADLs    This discharge recommendation:  Has been made in collaboration with the attending provider and/or case management    IF patient discharges home will need the following DME: none         SUBJECTIVE:   Patient stated Don't ask me how I'm feeling, you have no idea.     OBJECTIVE DATA SUMMARY:   HISTORY:    Past Medical History:   Diagnosis Date    Arthritis     RA    Cerebral artery occlusion with cerebral infarction ([address]     left side weakness and painful    Chronic pain     left side    Essential hypertension      Past Surgical History:   Procedure Laterality Date    HX GYN      HX KNEE REPLACEMENT Left     HX OPEN REDUCTION INTERNAL FIXATION Left 05/2020    distal radius Personal factors and/or comorbidities impacting plan of care:     Home Situation  Home Environment: Private residence  # Steps to Enter: 4  Rails to Enter: Yes  Hand Rails : Left  One/Two Story Residence: One story  Living Alone: Yes  Support Systems: Family member(s), Friends \ neighbors  Patient Expects to be Discharged to[de-identified] Private residence  Current DME Used/Available at Home: None    EXAMINATION/PRESENTATION/DECISION MAKING:   Critical Behavior:  Neurologic State: Alert  Orientation Level: Oriented X4  Cognition: Follows commands     Hearing:   Auditory  Auditory Impairment: Hard of hearing, left side  Skin:  dressing clean, dry, intact  Edema: L digits   Range Of Motion:  AROM: Within functional limits(except for LUE due to sling/cast)                       Strength:    Strength: Generally decreased, functional                    Tone & Sensation:   Tone: Normal                              Coordination:  Coordination: Generally decreased, functional  Vision:      Functional Mobility:  Bed Mobility:  Rolling: Independent  Supine to Sit: Independent     Scooting: Independent  Transfers:  Sit to Stand: Supervision  Stand to Sit: Supervision        Bed to Chair: Stand-by assistance              Balance:   Sitting: Intact  Standing: Impaired  Standing - Static: Good  Standing - Dynamic : Fair  Ambulation/Gait Training:  Distance (ft): 250 Feet (ft)  Assistive Device: Gait belt  Ambulation - Level of Assistance: Stand-by assistance        Gait Abnormalities: Path deviations        Base of Support: Narrowed     Speed/Alyssa: Pace decreased (<100 feet/min)                        Stairs:  Number of Stairs Trained: 4  Stairs - Level of Assistance: Stand-by assistance   Rail Use: Left       Functional Measure:  Barthel Index:    Bathin  Bladder: 10  Bowels: 10  Groomin  Dressin  Feedin  Mobility: 10  Stairs: 10  Toilet Use: 10  Transfer (Bed to Chair and Back): 10  Total: 75/100       The Barthel ADL Index: Guidelines  1. The index should be used as a record of what a patient does, not as a record of what a patient could do. 2. The main aim is to establish degree of independence from any help, physical or verbal, however minor and for whatever reason. 3. The need for supervision renders the patient not independent. 4. A patient's performance should be established using the best available evidence. Asking the patient, friends/relatives and nurses are the usual sources, but direct observation and common sense are also important. However direct testing is not needed. 5. Usually the patient's performance over the preceding 24-48 hours is important, but occasionally longer periods will be relevant. 6. Middle categories imply that the patient supplies over 50 per cent of the effort. 7. Use of aids to be independent is allowed. Tosha Warner., Barthel, D.W. ([id number]). Functional evaluation: the Barthel Index. [facility] (14)2. ANGEL Parker, Conor Woods., Chante Sandoval., [place], [address] [place] [address] (1999). Measuring the change indisability after inpatient rehabilitation; comparison of the responsiveness of the Barthel Index and Functional Man Measure. Journal of Neurology, Neurosurgery, and Psychiatry, 66(4), 025-105. Maureen Maldonado, N.J.A, MARYAN Johnson, & Gisela Kimble M.A. (2004.) Assessment of post-stroke quality of life in cost-effectiveness studies: The usefulness of the Barthel Index and the EuroQoL-5D.  Quality of Life Research, 15, 533-30           Physical Therapy Evaluation Charge Determination   History Examination Presentation Decision-Making   MEDIUM  Complexity : 1-2 comorbidities / personal factors will impact the outcome/ POC  MEDIUM Complexity : 3 Standardized tests and measures addressing body structure, function, activity limitation and / or participation in recreation  HIGH Complexity : Unstable and unpredictable characteristics  LOW Complexity : FOTO score of  Based on the above components, the patient evaluation is determined to be of the following complexity level: MEDIUM    Pain Rating:  Denied complaints of LUE, reports chronic pain at baseline    Activity Tolerance:   Good  Please refer to the flowsheet for vital signs taken during this treatment. After treatment patient left in no apparent distress:   Sitting in chair and Call bell within reach    COMMUNICATION/EDUCATION:   The patients plan of care was discussed with: Registered nurse and Ortho PA . Fall prevention education was provided and the patient/caregiver indicated understanding., Patient/family have participated as able in goal setting and plan of care. , and Patient/family agree to work toward stated goals and plan of care.     Thank you for this referral.  Dottie Clark, PT, DPT   Time Calculation: 15 mins

## 2020-07-24 NOTE — PROGRESS NOTES
Bedside shift change report given to Dianna Tiwari (oncoming nurse) by Olga (offgoing nurse). Report included the following information SBAR, Kardex, Intake/Output, MAR, Recent Results and Med Rec Status.

## 2020-07-24 NOTE — DISCHARGE INSTRUCTIONS
Ortho:  Keep splint clean and dry  Elevated the left hand on several pillows, hand above the elbow  Non-weight bearing on the left hand  Use your sling when up out of bed/chair/walking  Follow up with Dr Chapo Lenz - call for appt     Home health will come to help with thearpy    Patient Education        Open Reduction With Internal Fixation of a Limb: What to Expect at [address] can expect some pain and swelling around the cut (incision) the doctor made. This should get better within a few days after your surgery. But it is normal to have some pain for 2 to 3 weeks after surgery and mild pain for up to 6 weeks after surgery. How soon you can return to work and your normal routine depends on your job and how long it takes the bone to heal. For example, if you have a fractured leg and you sit at work, you may be able to go back in 1 to 2 weeks. But if your job requires you to walk or stand a lot, you will need to wait until your fracture has healed before you go back to work. This care sheet gives you a general idea about how long it will take for you to recover. But each person recovers at a different pace. Follow the steps below to get better as quickly as possible. How can you care for yourself at home? Activity  · Rest when you feel tired. Getting enough sleep will help you recover. · Increase your activity as recommended by your doctor. Being active boosts blood flow and helps prevent pneumonia and constipation. It is usually okay to exercise other parts of your body as soon as you feel well enough. · Avoid putting weight on your repaired bone until your doctor says it is okay. · You will probably need to take 1 to 2 weeks off from work. It depends on the type of work you do and how you feel. · Do not shower for 1 or 2 days after surgery. When you shower, keep your dressing and incisions dry. If you have a cast, tape a sheet of plastic to cover it so that it does not get wet.  It may help to sit on a shower stool. · Do not take a bath, swim, use a hot tub, or soak your affected limb until your incision is healed. This usually takes 1 to 2 weeks. Diet  · You can eat your normal diet. If your stomach is upset, try bland, low-fat foods like plain rice, broiled chicken, toast, and yogurt. Medicines  · Your doctor will tell you if and when you can restart your medicines. He or she will also give you instructions about taking any new medicines. · If you take aspirin or some other blood thinner, ask your doctor if and when to start taking it again. Make sure that you understand exactly what your doctor wants you to do. · Take pain medicines exactly as directed. ? If the doctor gave you a prescription medicine for pain, take it as prescribed. ? If you are not taking a prescription pain medicine, ask your doctor if you can take an over-the-counter medicine. · If you think your pain medicine is making you sick to your stomach:  ? Take your medicine after meals (unless your doctor has told you not to). ? Ask your doctor for a different pain medicine. · If your doctor prescribed antibiotics, take them as directed. Do not stop taking them just because you feel better. You need to take the full course of antibiotics. Incision care  · If you have strips of tape on the incision, leave the tape on for a week or until it falls off. · If you do not have a cast, clean the incision 2 times a day after your doctor allows you to remove the bandage. Use only soap and water to clean the incision unless your doctor gives you different instructions. Don't use hydrogen peroxide or alcohol, which can slow healing. Exercise  · Do exercises as instructed by your doctor or physical therapist. These exercises will help keep your muscles strong and your joints flexible while your bone is healing. · Wiggle your fingers or toes on the injured arm or leg often. This helps reduce swelling and stiffness.   Ice and elevation  · Prop up the injured arm or leg on a pillow when you ice it or anytime you sit or lie down during the first 1 to 2 weeks after your surgery. Try to keep it above the level of your heart. This will help reduce swelling and pain. Other instructions  · If you have a cast or splint:  ? Keep it dry. ? If you have a removable splint, ask your doctor if it is okay to take it off to bathe. Your doctor may want you to keep it on as much as possible. Be careful not to put the splint on too tight. ? Do not stick objects such as pencils or coat hangers in your cast or splint to scratch your skin. ? Do not put powder into your cast or splint to relieve itchy skin. ? Never cut or alter your cast or splint. Follow-up care is a key part of your treatment and safety. Be sure to make and go to all appointments, and call your doctor if you are having problems. It's also a good idea to know your test results and keep a list of the medicines you take. When should you call for help? [id number] anytime you think you may need emergency care. For example, call if:  · You passed out (lost consciousness). · You have severe trouble breathing. · You have sudden chest pain and shortness of breath, or you cough up blood. Call your doctor now or seek immediate medical care if:  · You have pain that does not get better after you take pain medicine. · Your fingers or toes on the injured arm or leg are cool, pale, or change color. · You have tingling or numbness in your fingers or toes. · You cannot move your fingers or toes. · Your cast or splint feels too tight. · The skin under your cast or splint is burning or stinging. · You have signs of infection, such as:  ? Increased pain, swelling, warmth, or redness. ? Red streaks leading from the incision. ? Pus draining from the incision. ? A fever. · You have drainage or a bad smell coming from the cast or splint.   · You have signs of a blood clot, such as:  ? Pain in your calf, back of the knee, thigh, or groin. ? Redness and swelling in your leg or groin. · You have new or worse nausea or vomiting. · You are too sick to your stomach to drink any fluids. · You cannot keep down fluids. Watch closely for any changes in your health, and be sure to contact your doctor if:  · You have any problems with your cast or splint. Where can you learn more? Go to http://cecelia-yolanda.info/  Enter V171 in the search box to learn more about \"Open Reduction With Internal Fixation of a Limb: What to Expect at Home. \"  Current as of: March 2, 2020               Content Version: 12.5  © 7492-1974 Healthwise, [employer]. Care instructions adapted under license by [employer] (which disclaims liability or warranty for this information). If you have questions about a medical condition or this instruction, always ask your healthcare professional. [address] any warranty or liability for your use of this information.

## 2020-07-24 NOTE — PROGRESS NOTES
Problem: Falls - Risk of  Goal: *Absence of Falls  Description: Document Remberto Bipin Fall Risk and appropriate interventions in the flowsheet. Outcome: Progressing Towards Goal  Note: Fall Risk Interventions:  Mobility Interventions: Patient to call before getting OOB, Communicate number of staff needed for ambulation/transfer    Medication Interventions: Patient to call before getting OOB, Teach patient to arise slowly    Elimination Interventions: Call light in reach, Patient to call for help with toileting needs, Stay With Me (per policy)    History of Falls Interventions: Door open when patient unattended, Room close to nurse's station         Problem: Pressure Injury - Risk of  Goal: *Prevention of pressure injury  Description: Document Tong Scale and appropriate interventions in the flowsheet. Outcome: Progressing Towards Goal  Note: Pressure Injury Interventions:  Sensory Interventions: Keep linens dry and wrinkle-free    Moisture Interventions: Absorbent underpads    Activity Interventions: Increase time out of bed    Mobility Interventions: PT/OT evaluation, Turn and reposition approx.  every two hours(pillow and wedges)    Nutrition Interventions: Document food/fluid/supplement intake, Offer support with meals,snacks and hydration       Problem: Upper Extremity Surgical Pathway: POD 1  Goal: Activity/Safety  Outcome: Progressing Towards Goal  Goal: Nutrition/Diet  Outcome: Progressing Towards Goal  Goal: Discharge Planning  Outcome: Progressing Towards Goal  Goal: Medications  Outcome: Progressing Towards Goal  Goal: Respiratory  Outcome: Progressing Towards Goal  Goal: Treatments/Interventions/Procedures  Outcome: Progressing Towards Goal  Goal: Psychosocial  Outcome: Progressing Towards Goal

## 2020-07-24 NOTE — PROGRESS NOTES
Hospitalist Progress Note    NAME: Kera Doan   :  1950   MRN:  [id number]     Interim Hospital Summary: 69 y.o. female whom presented on 2020 with      Assessment / Plan:  Acute fractures of Left distal radius and ulna s/p ORIF on   -on norco for pain and Lovenox for dvt px   -Post op care per ortho  -consult pt/ot  -pt wants to go to home regardless but pt's friend wants her to go to rehab     Hypertensive urgency likely from pain  -Bp is now controlled  -Continue  Lisinopril, discharged on Norvasc 5 mg daily  -I informed patient to please check her blood pressure daily and take the readings to primary care physician for adjustment of medications    History of rheumatoid arthritis  Depression  Dyslipidemia  History of CVA with residual left-sided weakness  -On Remicade and methotrexate at home. Continue folic acid  -Continue home Zoloft and trazodone  -Continue home statin      Dispo: She is cleared from internal medicine standpoint to be discharged for outpatient follow-up. 18.5 - 24.9 Normal weight / Body mass index is 19.84 kg/m². Code status: Full  Prophylaxis: Lovenox  Recommended Disposition: TBD       Subjective:     Chief Complaint / Reason for Physician Visit  Pain is adequately controlled. Less anxiety today. Blood pressure is controlled      Review of Systems:  Symptom Y/N Comments  Symptom Y/N Comments   Fever/Chills    Chest Pain     Poor Appetite    Edema     Cough    Abdominal Pain     Sputum    Joint Pain X    SOB/GONZALEZ    Pruritis/Rash     Nausea/vomit    Tolerating PT/OT     Diarrhea    Tolerating Diet     Constipation    Other       Could NOT obtain due to:      PO intake: No data found. Objective:     VITALS:   Last 24hrs VS reviewed since prior progress note.  Most recent are:  Patient Vitals for the past 24 hrs:   Temp Pulse Resp BP SpO2   20 1105 98.1 °F (36.7 °C) 88 16 114/74 93 %   20 0743 98.1 °F (36.7 °C) 84 15 124/76 94 %   07/24/20 0352 98.6 °F (37 °C) 70 16 132/75 99 %   07/23/20 2302 98.1 °F (36.7 °C) 78 16 145/80 96 %   07/23/20 1959 98.7 °F (37.1 °C) 88 16 140/80 97 %   07/23/20 1601 98 °F (36.7 °C) 88 18 129/87 96 %     No intake or output data in the 24 hours ending 07/24/20 1422     PHYSICAL EXAM:  General:Alert, cooperative, no acute distress    EENT:  EOMI. Anicteric sclerae. MMM  Resp:  CTA bilaterally, no wheezing or rales. No accessory muscle use  CV:  Regular  rhythm,  No edema  GI:  Soft, Non distended, Non tender.  +Bowel sounds  Neurologic:  Alert and oriented X 3, normal speech,   Psych:   anxious  Skin:  No rashes. No jaundice  MSK - left Wrist in cast    Reviewed most current lab test results and cultures  YES  Reviewed most current radiology test results   YES  Review and summation of old records today    NO  Reviewed patient's current orders and MAR    YES  PMH/SH reviewed - no change compared to H&P  ________________________________________________________________________  Care Plan discussed with:    Comments   Patient X    Family      RN X    Care Manager     Consultant                        Multidiciplinary team rounds were held today with , nursing, pharmacist and clinical coordinator. Patient's plan of care was discussed; medications were reviewed and discharge planning was addressed.      ________________________________________________________________________  Total NON critical care TIME:   35   Minutes    Total CRITICAL CARE TIME Spent:   Minutes non procedure based      Comments   >50% of visit spent in counseling and coordination of care x     This includes time during multidisciplinary rounds if indicated above   ________________________________________________________________________  Neville Michael MD     Procedures: see electronic medical records for all procedures/Xrays and details which were not copied into this note but were reviewed prior to creation of Plan.      LABS:  I reviewed today's most current labs and imaging studies. Pertinent labs include:  Recent Labs     07/24/20  0501   WBC 8.8   HGB 11.4*   HCT 35.3        No results for input(s): NA, K, CL, CO2, GLU, BUN, CREA, CA, MG, PHOS, ALB, TBIL, TBILI, ALT, INR, INREXT, INREXT in the last 72 hours.     No lab exists for component: SGOT

## 2020-07-27 ENCOUNTER — PATIENT OUTREACH (OUTPATIENT)
Dept: CASE MANAGEMENT | Age: 70
End: 2020-07-27

## 2020-07-29 NOTE — DISCHARGE SUMMARY
Ortho Discharge Summary    Patient ID:  Rama Galindo  [id number]  female  69 y.o.  1950    Admit date: 7/20/2020    Discharge date: 7/29/2020    Admitting Physician: Aline Haq MD     Consulting Physician(s):   Treatment Team: Consulting Provider: Melissa Gray MD; Consulting Provider: Rafa Pop MD; Consulting Provider: Maximo Miguel NP; Utilization Review: Topher Sorensen RN; Care Manager: Laney Wright    Date of Surgery:   7/22/2020     Preoperative Diagnosis:  Open perirosthetic FRACTURE LEFT RADIUS AND ULNA    Postoperative Diagnosis:   Open perirostheticFRACTURE LEFT RADIUS AND ULNA    Procedure(s):   Left REMOVAL HARDWARE LEFT WRIST/OPEN REDUCTION INTERNAL FIXATION LEFT BOTH BONE FORARM FRACTURE (MAC/REG) (URGENT)     Anesthesia Type:   MAC     Surgeon: Melissa Gray MD                            HPI:  Pt is a 69 y.o. female who Presented with an open periprosthetic of her left radius and ulna. She is status post open reduction internal fixation of her left distal radius in May of this year. She had sustained another fall immediately prior to this visit. PMH:   Past Medical History:   Diagnosis Date    Arthritis     RA    Cerebral artery occlusion with cerebral infarction (HCC)     left side weakness and painful    Chronic pain     left side    Essential hypertension        Body mass index is 19.84 kg/m². : A BMI > 30 is classified as obesity and > 40 is classified as morbid obesity. Medications upon admission :   Prior to Admission Medications   Prescriptions Last Dose Informant Patient Reported? Taking? amLODIPine (NORVASC) 2.5 mg tablet 7/20/2020 at 0900 Self Yes No   Sig: TAKE 1 TABLET BY MOUTH ONCE A DAY FOR BLOOD PRESSURE   folic acid (FOLVITE) 1 mg tablet 7/20/2020 at Unknown time Self Yes Yes   Sig: Take 1 mg by mouth daily. gabapentin (NEURONTIN) 300 mg capsule 7/21/2020 at 0800 Self Yes Yes   Sig: Take 600 mg by mouth three (3) times daily. inFLIXimab (REMICADE) 100 mg injection Unknown at Unknown time Self Yes No   Sig: by IntraVENous route every two (2) months. lisinopril (PRINIVIL, ZESTRIL) 20 mg tablet 7/20/2020 at 0900 Self Yes Yes   Sig: Take  by mouth daily. methotrexate (RHEUMATREX) 2.5 mg tablet 7/14/2020 Self Yes Yes   Sig: Take 2.5 mg by mouth every Tuesday. Taking 6 tabs once every Tuesday    naloxone [facility]) 4 mg/actuation nasal spray Not Taking at Unknown time Self No No   Sig: Use 1 spray intranasally, then discard. Repeat with new spray every 2 min as needed for opioid overdose symptoms, alternating nostrils. sertraline (ZOLOFT) 50 mg tablet 7/20/2020 at 0900 Self Yes Yes   Sig: Take 100 mg by mouth daily. simvastatin (ZOCOR) 10 mg tablet 7/19/2020 at 2100 Self Yes Yes   Sig: Take 10 mg by mouth nightly. traZODone (DESYREL) 50 mg tablet 7/19/2020 at 2100 Self Yes Yes   Sig: Take 50 mg by mouth nightly. May take an additional 50mg (up to 100mg). Facility-Administered Medications: None        Allergies:  No Known Allergies     Hospital Course: The patient underwent surgery. Complications:  None; patient tolerated the procedure well. Was taken to the PACU in stable condition and then transferred to the ortho floor. Perioperative Antibiotics:   Ancef    Postoperative Pain Management:  Oxycodone     DVT Prophylaxis: Aspirin    Postoperative transfusions:   Not applicable    Post Op complications: none    Hemoglobin at discharge:    Lab Results   Component Value Date/Time    HGB 11.4 (L) 07/24/2020 05:01 AM    INR 1.0 07/21/2020 04:35 AM        stable postoperative them in a shin and long arm splint. Physical Therapy started following surgery and participated in bed mobility, transfers and ambulation.         Gait:  Gait  Base of Support: Narrowed  Speed/Alyssa: Pace decreased (<100 feet/min)  Gait Abnormalities: Path deviations  Ambulation - Level of Assistance: Stand-by assistance  Distance (ft): 250 Feet (ft)  Assistive Device: Gait belt  Rail Use: Left   Stairs - Level of Assistance: Stand-by assistance  Number of Stairs Trained: 4                   Discharged to:   Home    Condition on Discharge:    stable    Discharge instructions:  - Anticoagulate with Aspirin   - Take pain medications as prescribed  - Resume pre hospital diet      - Discharge activity: activity as tolerated  - Ambulate with assistive device as needed. - Weight bearing status  Nonweightbearing left upper extremity  - Wound Care Keep wound clean and dry. See discharge instruction sheet. -DISCHARGE MEDICATION LIST     Discharge Medication List as of 7/24/2020 11:27 AM      START taking these medications    Details   HYDROcodone-acetaminophen (NORCO) 7.5-325 mg per tablet Take 1 Tab by mouth every six (6) hours as needed for Pain for up to 3 days. Max Daily Amount: 4 Tabs., Print, Disp-30 Tab,R-0         CONTINUE these medications which have CHANGED    Details   amLODIPine (NORVASC) 5 mg tablet Take 1 Tab by mouth daily for 30 days. , Print, Disp-30 Tab,R-0         CONTINUE these medications which have NOT CHANGED    Details   gabapentin (NEURONTIN) 300 mg capsule Take 600 mg by mouth three (3) times daily. , Historical Med      folic acid (FOLVITE) 1 mg tablet Take 1 mg by mouth daily. , Historical Med      lisinopril (PRINIVIL, ZESTRIL) 20 mg tablet Take  by mouth daily. , Historical Med      methotrexate (RHEUMATREX) 2.5 mg tablet Take 2.5 mg by mouth every Tuesday. Taking 6 tabs once every Tuesday , Historical Med      sertraline (ZOLOFT) 50 mg tablet Take 100 mg by mouth daily. , Historical Med      simvastatin (ZOCOR) 10 mg tablet Take 10 mg by mouth nightly., Historical Med      traZODone (DESYREL) 50 mg tablet Take 50 mg by mouth nightly. May take an additional 50mg (up to 100mg). , Historical Med      naloxone (NARCAN) 4 mg/actuation nasal spray Use 1 spray intranasally, then discard.  Repeat with new spray every 2 min as needed for opioid overdose symptoms, alternating nostrils. , Normal, Disp-2 Each,R-0      inFLIXimab (REMICADE) 100 mg injection by IntraVENous route every two (2) months., Historical Med          per medical continuation form      -Follow up in office  2 weeks postop      Signed:  [facility]  7/29/2020  3:26 PM

## 2020-07-30 ENCOUNTER — PATIENT OUTREACH (OUTPATIENT)
Dept: CASE MANAGEMENT | Age: 70
End: 2020-07-30

## 2020-07-30 NOTE — PROGRESS NOTES
Comment alert noted in remote symptom monitoring program. Messaged patient with answer to question. COMMENT  =======  Posted At: 8:28 AM EDT  Posted By: Deep Montejo (Staff)  Comment: Alina Godfreyoter Morning Mita Sames! Thank you for checking in. Please call the [address] [place] [address] Way and talk with them on how best to communicate. At [address] [phone]. Thank you, Addie    CHECK-IN  ========  Checked In At: 8:13 AM EDT  Notified On: July 30, 2020  Comment: I WAS SUPPOSED TO GET HOME HEALTHCARE TUESDAY, BUT NO CALLS. i CANT RECIEVE VOICEMAIL CAN THEY CONTACT ME VIA EMAIL?

## 2021-10-05 ENCOUNTER — TRANSCRIBE ORDER (OUTPATIENT)
Dept: EMERGENCY DEPT | Age: 71
End: 2021-10-05

## 2021-10-05 ENCOUNTER — HOSPITAL ENCOUNTER (OUTPATIENT)
Dept: GENERAL RADIOLOGY | Age: 71
Discharge: HOME | End: 2021-10-05
Payer: MEDICARE

## 2021-10-05 DIAGNOSIS — M79.672 LEFT FOOT PAIN: ICD-10-CM

## 2021-10-05 DIAGNOSIS — M79.672 LEFT FOOT PAIN: Primary | ICD-10-CM

## 2021-10-05 PROCEDURE — 73630 X-RAY EXAM OF FOOT: CPT

## 2022-03-18 PROBLEM — S52.502B OPEN FRACTURE OF LEFT DISTAL RADIUS: Status: ACTIVE | Noted: 2020-07-20

## 2022-03-18 PROBLEM — S62.102A LEFT WRIST FRACTURE: Status: ACTIVE | Noted: 2020-05-04

## 2022-03-19 PROBLEM — S52.502B OPEN FRACTURE OF LEFT DISTAL RADIUS AND ULNA: Status: ACTIVE | Noted: 2020-07-20

## 2022-03-19 PROBLEM — S52.602B OPEN FRACTURE OF LEFT DISTAL RADIUS AND ULNA: Status: ACTIVE | Noted: 2020-07-20

## 2023-05-11 RX ORDER — LISINOPRIL 20 MG/1
TABLET ORAL DAILY
COMMUNITY

## 2023-05-11 RX ORDER — SIMVASTATIN 10 MG
10 TABLET ORAL NIGHTLY
COMMUNITY

## 2023-05-11 RX ORDER — TRAZODONE HYDROCHLORIDE 50 MG/1
TABLET ORAL
COMMUNITY

## 2023-05-11 RX ORDER — FOLIC ACID 1 MG/1
1 TABLET ORAL DAILY
COMMUNITY

## 2023-05-11 RX ORDER — NALOXONE HYDROCHLORIDE 4 MG/.1ML
SPRAY NASAL
COMMUNITY
Start: 2020-05-09

## 2023-05-11 RX ORDER — GABAPENTIN 300 MG/1
CAPSULE ORAL 3 TIMES DAILY
COMMUNITY

## 2023-05-11 RX ORDER — INFLIXIMAB 100 MG/10ML
INJECTION, POWDER, LYOPHILIZED, FOR SOLUTION INTRAVENOUS
COMMUNITY

## 2023-08-14 NOTE — PROGRESS NOTES
I have reviewed discharge instructions with the patient. All questions were answered. The patient verbalized understanding. E-sign received. IV removed. Prescription given to the patient. Patient escorted down to the discharge area by the PCT. Detail Level: Simple Additional Notes: Hand dermatitis handout given for occlusion therapy. Render Risk Assessment In Note?: no

## 2024-05-07 NOTE — PROGRESS NOTES
[facility]  Preoperative Instructions        Surgery Date 5/8/2024   Time of Arrival 1215pm  Contact# [phone]    On the day of your surgery, please report to Surgical Services Registration Desk and sign in at your designated time.  The Surgery Center is located to the right of the Emergency Room.     2. You must have someone with you to drive you home. You should not drive a car for 24 hours following surgery. Please make arrangements for a friend or family member to stay with you for the first 24 hours after your surgery.    3. Do not have anything to eat or drink (including water, gum, mints, coffee, juice) after midnight ?5/7/2024  .?This may not apply to medications prescribed by your physician. ?(Please note below the special instructions with medications to take the morning of your procedure.)    4. We recommend you do not drink any alcoholic beverages for 24 hours before and after your surgery.    5. Contact your surgeon’s office for instructions on the following medications: non-steroidal anti-inflammatory drugs (i.e. Advil, Aleve), vitamins, and supplements. (Some surgeon’s will want you to stop these medications prior to surgery and others may allow you to take them)  **If you are currently taking Plavix, Coumadin, Aspirin and/or other blood-thinning agents, contact your surgeon for instructions.** Your surgeon will partner with the physician prescribing these medications to determine if it is safe to stop or if you need to continue taking.  Please do not stop taking these medications without instructions from your surgeon    6. Wear comfortable clothes.  Wear glasses instead of contacts.  Do not bring any money or jewelry. Please bring picture ID, insurance card, and any prearranged co-payment or hospital payment.  Do not wear make-up, particularly mascara the morning of your surgery.  Do not wear nail polish, particularly if you are having foot /hand surgery.  Wear your

## 2024-05-08 ENCOUNTER — APPOINTMENT (OUTPATIENT)
Facility: HOSPITAL | Age: 74
DRG: 511 | End: 2024-05-08
Attending: ORTHOPAEDIC SURGERY
Payer: MEDICARE

## 2024-05-08 ENCOUNTER — HOSPITAL ENCOUNTER (INPATIENT)
Facility: HOSPITAL | Age: 74
LOS: 5 days | Discharge: NURSING, REHAB OR LONG-TERM CARE | DRG: 511 | End: 2024-05-13
Attending: ORTHOPAEDIC SURGERY | Admitting: ORTHOPAEDIC SURGERY
Payer: MEDICARE

## 2024-05-08 ENCOUNTER — ANESTHESIA (OUTPATIENT)
Facility: HOSPITAL | Age: 74
End: 2024-05-08
Payer: MEDICARE

## 2024-05-08 ENCOUNTER — ANESTHESIA EVENT (OUTPATIENT)
Facility: HOSPITAL | Age: 74
End: 2024-05-08
Payer: MEDICARE

## 2024-05-08 DIAGNOSIS — S52.022A OLECRANON FRACTURE, LEFT, CLOSED, INITIAL ENCOUNTER: Primary | ICD-10-CM

## 2024-05-08 PROCEDURE — 3600000014 HC SURGERY LEVEL 4 ADDTL 15MIN: Performed by: ORTHOPAEDIC SURGERY

## 2024-05-08 PROCEDURE — 3700000000 HC ANESTHESIA ATTENDED CARE: Performed by: ORTHOPAEDIC SURGERY

## 2024-05-08 PROCEDURE — 2580000003 HC RX 258: Performed by: ANESTHESIOLOGY

## 2024-05-08 PROCEDURE — 93005 ELECTROCARDIOGRAM TRACING: CPT | Performed by: ORTHOPAEDIC SURGERY

## 2024-05-08 PROCEDURE — 7100000000 HC PACU RECOVERY - FIRST 15 MIN: Performed by: ORTHOPAEDIC SURGERY

## 2024-05-08 PROCEDURE — 6360000002 HC RX W HCPCS: Performed by: STUDENT IN AN ORGANIZED HEALTH CARE EDUCATION/TRAINING PROGRAM

## 2024-05-08 PROCEDURE — 6360000002 HC RX W HCPCS: Performed by: ORTHOPAEDIC SURGERY

## 2024-05-08 PROCEDURE — 2580000003 HC RX 258: Performed by: NURSE ANESTHETIST, CERTIFIED REGISTERED

## 2024-05-08 PROCEDURE — 2580000003 HC RX 258: Performed by: ORTHOPAEDIC SURGERY

## 2024-05-08 PROCEDURE — 6370000000 HC RX 637 (ALT 250 FOR IP): Performed by: ORTHOPAEDIC SURGERY

## 2024-05-08 PROCEDURE — 2720000010 HC SURG SUPPLY STERILE: Performed by: ORTHOPAEDIC SURGERY

## 2024-05-08 PROCEDURE — 3600000004 HC SURGERY LEVEL 4 BASE: Performed by: ORTHOPAEDIC SURGERY

## 2024-05-08 PROCEDURE — 1100000000 HC RM PRIVATE

## 2024-05-08 PROCEDURE — C1769 GUIDE WIRE: HCPCS | Performed by: ORTHOPAEDIC SURGERY

## 2024-05-08 PROCEDURE — 64415 NJX AA&/STRD BRCH PLXS IMG: CPT | Performed by: STUDENT IN AN ORGANIZED HEALTH CARE EDUCATION/TRAINING PROGRAM

## 2024-05-08 PROCEDURE — 0PSL04Z REPOSITION LEFT ULNA WITH INTERNAL FIXATION DEVICE, OPEN APPROACH: ICD-10-PCS | Performed by: ORTHOPAEDIC SURGERY

## 2024-05-08 PROCEDURE — 2709999900 HC NON-CHARGEABLE SUPPLY: Performed by: ORTHOPAEDIC SURGERY

## 2024-05-08 PROCEDURE — 7100000001 HC PACU RECOVERY - ADDTL 15 MIN: Performed by: ORTHOPAEDIC SURGERY

## 2024-05-08 PROCEDURE — 6370000000 HC RX 637 (ALT 250 FOR IP): Performed by: STUDENT IN AN ORGANIZED HEALTH CARE EDUCATION/TRAINING PROGRAM

## 2024-05-08 PROCEDURE — 3700000001 HC ADD 15 MINUTES (ANESTHESIA): Performed by: ORTHOPAEDIC SURGERY

## 2024-05-08 PROCEDURE — 6360000002 HC RX W HCPCS: Performed by: NURSE ANESTHETIST, CERTIFIED REGISTERED

## 2024-05-08 PROCEDURE — C1713 ANCHOR/SCREW BN/BN,TIS/BN: HCPCS | Performed by: ORTHOPAEDIC SURGERY

## 2024-05-08 DEVICE — BONE SCREW
Type: IMPLANTABLE DEVICE | Site: ELBOW | Status: FUNCTIONAL
Brand: VARIAX

## 2024-05-08 DEVICE — LOCKING SCREW
Type: IMPLANTABLE DEVICE | Site: ELBOW | Status: FUNCTIONAL
Brand: VARIAX

## 2024-05-08 DEVICE — OLECRANON PLATE
Type: IMPLANTABLE DEVICE | Site: ELBOW | Status: FUNCTIONAL
Brand: VARIAX

## 2024-05-08 RX ORDER — GLYCOPYRROLATE 0.2 MG/ML
INJECTION INTRAMUSCULAR; INTRAVENOUS PRN
Status: DISCONTINUED | OUTPATIENT
Start: 2024-05-08 | End: 2024-05-08 | Stop reason: SDUPTHER

## 2024-05-08 RX ORDER — TRAMADOL HYDROCHLORIDE 50 MG/1
50 TABLET ORAL EVERY 6 HOURS PRN
Status: DISCONTINUED | OUTPATIENT
Start: 2024-05-08 | End: 2024-05-11

## 2024-05-08 RX ORDER — NALOXONE HYDROCHLORIDE 0.4 MG/ML
INJECTION, SOLUTION INTRAMUSCULAR; INTRAVENOUS; SUBCUTANEOUS PRN
Status: DISCONTINUED | OUTPATIENT
Start: 2024-05-08 | End: 2024-05-08 | Stop reason: HOSPADM

## 2024-05-08 RX ORDER — DEXAMETHASONE SODIUM PHOSPHATE 4 MG/ML
INJECTION, SOLUTION INTRA-ARTICULAR; INTRALESIONAL; INTRAMUSCULAR; INTRAVENOUS; SOFT TISSUE PRN
Status: DISCONTINUED | OUTPATIENT
Start: 2024-05-08 | End: 2024-05-08 | Stop reason: SDUPTHER

## 2024-05-08 RX ORDER — DEXTROSE MONOHYDRATE 100 MG/ML
INJECTION, SOLUTION INTRAVENOUS CONTINUOUS PRN
Status: DISCONTINUED | OUTPATIENT
Start: 2024-05-08 | End: 2024-05-08 | Stop reason: HOSPADM

## 2024-05-08 RX ORDER — ONDANSETRON 2 MG/ML
4 INJECTION INTRAMUSCULAR; INTRAVENOUS
Status: DISCONTINUED | OUTPATIENT
Start: 2024-05-08 | End: 2024-05-08 | Stop reason: HOSPADM

## 2024-05-08 RX ORDER — SODIUM CHLORIDE 0.9 % (FLUSH) 0.9 %
5-40 SYRINGE (ML) INJECTION PRN
Status: DISCONTINUED | OUTPATIENT
Start: 2024-05-08 | End: 2024-05-08 | Stop reason: HOSPADM

## 2024-05-08 RX ORDER — GLUCAGON 1 MG/ML
1 KIT INJECTION PRN
Status: DISCONTINUED | OUTPATIENT
Start: 2024-05-08 | End: 2024-05-08 | Stop reason: HOSPADM

## 2024-05-08 RX ORDER — 0.9 % SODIUM CHLORIDE 0.9 %
500 INTRAVENOUS SOLUTION INTRAVENOUS ONCE AS NEEDED
Status: COMPLETED | OUTPATIENT
Start: 2024-05-08 | End: 2024-05-13

## 2024-05-08 RX ORDER — SODIUM CHLORIDE 9 MG/ML
INJECTION, SOLUTION INTRAVENOUS PRN
Status: DISCONTINUED | OUTPATIENT
Start: 2024-05-08 | End: 2024-05-08 | Stop reason: HOSPADM

## 2024-05-08 RX ORDER — ACETAMINOPHEN 500 MG
1000 TABLET ORAL ONCE
Status: COMPLETED | OUTPATIENT
Start: 2024-05-08 | End: 2024-05-08

## 2024-05-08 RX ORDER — POLYETHYLENE GLYCOL 3350 17 G/17G
17 POWDER, FOR SOLUTION ORAL DAILY
Status: DISCONTINUED | OUTPATIENT
Start: 2024-05-08 | End: 2024-05-13 | Stop reason: HOSPADM

## 2024-05-08 RX ORDER — SODIUM CHLORIDE 0.9 % (FLUSH) 0.9 %
5-40 SYRINGE (ML) INJECTION PRN
Status: DISCONTINUED | OUTPATIENT
Start: 2024-05-08 | End: 2024-05-13 | Stop reason: HOSPADM

## 2024-05-08 RX ORDER — DIPHENHYDRAMINE HYDROCHLORIDE 50 MG/ML
25 INJECTION INTRAMUSCULAR; INTRAVENOUS EVERY 6 HOURS PRN
Status: DISCONTINUED | OUTPATIENT
Start: 2024-05-08 | End: 2024-05-13 | Stop reason: HOSPADM

## 2024-05-08 RX ORDER — TRAMADOL HYDROCHLORIDE 50 MG/1
100 TABLET ORAL EVERY 6 HOURS PRN
Status: DISCONTINUED | OUTPATIENT
Start: 2024-05-08 | End: 2024-05-11

## 2024-05-08 RX ORDER — SODIUM CHLORIDE, SODIUM LACTATE, POTASSIUM CHLORIDE, CALCIUM CHLORIDE 600; 310; 30; 20 MG/100ML; MG/100ML; MG/100ML; MG/100ML
INJECTION, SOLUTION INTRAVENOUS CONTINUOUS PRN
Status: DISCONTINUED | OUTPATIENT
Start: 2024-05-08 | End: 2024-05-08 | Stop reason: SDUPTHER

## 2024-05-08 RX ORDER — IPRATROPIUM BROMIDE AND ALBUTEROL SULFATE 2.5; .5 MG/3ML; MG/3ML
1 SOLUTION RESPIRATORY (INHALATION)
Status: DISCONTINUED | OUTPATIENT
Start: 2024-05-08 | End: 2024-05-08 | Stop reason: HOSPADM

## 2024-05-08 RX ORDER — MIDAZOLAM HYDROCHLORIDE 1 MG/ML
INJECTION INTRAMUSCULAR; INTRAVENOUS
Status: COMPLETED | OUTPATIENT
Start: 2024-05-08 | End: 2024-05-08

## 2024-05-08 RX ORDER — SODIUM CHLORIDE 9 MG/ML
INJECTION, SOLUTION INTRAVENOUS CONTINUOUS
Status: DISCONTINUED | OUTPATIENT
Start: 2024-05-08 | End: 2024-05-13 | Stop reason: HOSPADM

## 2024-05-08 RX ORDER — KETOROLAC TROMETHAMINE 30 MG/ML
15 INJECTION, SOLUTION INTRAMUSCULAR; INTRAVENOUS EVERY 6 HOURS PRN
Status: DISCONTINUED | OUTPATIENT
Start: 2024-05-08 | End: 2024-05-13 | Stop reason: HOSPADM

## 2024-05-08 RX ORDER — SODIUM CHLORIDE, SODIUM LACTATE, POTASSIUM CHLORIDE, CALCIUM CHLORIDE 600; 310; 30; 20 MG/100ML; MG/100ML; MG/100ML; MG/100ML
INJECTION, SOLUTION INTRAVENOUS ONCE
Status: COMPLETED | OUTPATIENT
Start: 2024-05-08 | End: 2024-05-08

## 2024-05-08 RX ORDER — HYDROMORPHONE HYDROCHLORIDE 1 MG/ML
0.5 INJECTION, SOLUTION INTRAMUSCULAR; INTRAVENOUS; SUBCUTANEOUS EVERY 5 MIN PRN
Status: DISCONTINUED | OUTPATIENT
Start: 2024-05-08 | End: 2024-05-08 | Stop reason: HOSPADM

## 2024-05-08 RX ORDER — MORPHINE SULFATE 2 MG/ML
2 INJECTION, SOLUTION INTRAMUSCULAR; INTRAVENOUS
Status: DISPENSED | OUTPATIENT
Start: 2024-05-08 | End: 2024-05-09

## 2024-05-08 RX ORDER — ROPIVACAINE HYDROCHLORIDE 5 MG/ML
INJECTION, SOLUTION EPIDURAL; INFILTRATION; PERINEURAL
Status: COMPLETED | OUTPATIENT
Start: 2024-05-08 | End: 2024-05-08

## 2024-05-08 RX ORDER — BISACODYL 10 MG
10 SUPPOSITORY, RECTAL RECTAL DAILY PRN
Status: DISCONTINUED | OUTPATIENT
Start: 2024-05-08 | End: 2024-05-13 | Stop reason: HOSPADM

## 2024-05-08 RX ORDER — FENTANYL CITRATE 50 UG/ML
INJECTION, SOLUTION INTRAMUSCULAR; INTRAVENOUS
Status: COMPLETED | OUTPATIENT
Start: 2024-05-08 | End: 2024-05-08

## 2024-05-08 RX ORDER — DIPHENHYDRAMINE HCL 25 MG
25 CAPSULE ORAL EVERY 6 HOURS PRN
Status: DISCONTINUED | OUTPATIENT
Start: 2024-05-08 | End: 2024-05-13 | Stop reason: HOSPADM

## 2024-05-08 RX ORDER — SENNA AND DOCUSATE SODIUM 50; 8.6 MG/1; MG/1
1 TABLET, FILM COATED ORAL 2 TIMES DAILY
Status: DISCONTINUED | OUTPATIENT
Start: 2024-05-08 | End: 2024-05-13 | Stop reason: HOSPADM

## 2024-05-08 RX ORDER — SODIUM CHLORIDE 0.9 % (FLUSH) 0.9 %
5-40 SYRINGE (ML) INJECTION EVERY 12 HOURS SCHEDULED
Status: DISCONTINUED | OUTPATIENT
Start: 2024-05-08 | End: 2024-05-08 | Stop reason: HOSPADM

## 2024-05-08 RX ORDER — SODIUM CHLORIDE 0.9 % (FLUSH) 0.9 %
5-40 SYRINGE (ML) INJECTION EVERY 12 HOURS SCHEDULED
Status: DISCONTINUED | OUTPATIENT
Start: 2024-05-08 | End: 2024-05-13 | Stop reason: HOSPADM

## 2024-05-08 RX ORDER — ONDANSETRON 2 MG/ML
INJECTION INTRAMUSCULAR; INTRAVENOUS PRN
Status: DISCONTINUED | OUTPATIENT
Start: 2024-05-08 | End: 2024-05-08 | Stop reason: SDUPTHER

## 2024-05-08 RX ORDER — PROCHLORPERAZINE EDISYLATE 5 MG/ML
5 INJECTION INTRAMUSCULAR; INTRAVENOUS
Status: DISCONTINUED | OUTPATIENT
Start: 2024-05-08 | End: 2024-05-08 | Stop reason: HOSPADM

## 2024-05-08 RX ORDER — FAMOTIDINE 20 MG/1
20 TABLET, FILM COATED ORAL 2 TIMES DAILY
Status: DISCONTINUED | OUTPATIENT
Start: 2024-05-08 | End: 2024-05-13 | Stop reason: HOSPADM

## 2024-05-08 RX ORDER — ONDANSETRON 2 MG/ML
4 INJECTION INTRAMUSCULAR; INTRAVENOUS EVERY 6 HOURS PRN
Status: DISCONTINUED | OUTPATIENT
Start: 2024-05-08 | End: 2024-05-13 | Stop reason: HOSPADM

## 2024-05-08 RX ORDER — ONDANSETRON 4 MG/1
4 TABLET, ORALLY DISINTEGRATING ORAL EVERY 8 HOURS PRN
Status: DISCONTINUED | OUTPATIENT
Start: 2024-05-08 | End: 2024-05-13 | Stop reason: HOSPADM

## 2024-05-08 RX ORDER — ACETAMINOPHEN 500 MG
1000 TABLET ORAL EVERY 8 HOURS SCHEDULED
Status: DISCONTINUED | OUTPATIENT
Start: 2024-05-08 | End: 2024-05-13 | Stop reason: HOSPADM

## 2024-05-08 RX ORDER — FENTANYL CITRATE 50 UG/ML
25 INJECTION, SOLUTION INTRAMUSCULAR; INTRAVENOUS EVERY 5 MIN PRN
Status: DISCONTINUED | OUTPATIENT
Start: 2024-05-08 | End: 2024-05-08 | Stop reason: HOSPADM

## 2024-05-08 RX ORDER — GABAPENTIN 300 MG/1
300 CAPSULE ORAL 3 TIMES DAILY
Status: DISCONTINUED | OUTPATIENT
Start: 2024-05-08 | End: 2024-05-13 | Stop reason: HOSPADM

## 2024-05-08 RX ORDER — LISINOPRIL 20 MG/1
20 TABLET ORAL DAILY
Status: DISCONTINUED | OUTPATIENT
Start: 2024-05-09 | End: 2024-05-13 | Stop reason: HOSPADM

## 2024-05-08 RX ADMIN — SODIUM CHLORIDE, POTASSIUM CHLORIDE, SODIUM LACTATE AND CALCIUM CHLORIDE: 600; 310; 30; 20 INJECTION, SOLUTION INTRAVENOUS at 13:48

## 2024-05-08 RX ADMIN — FENTANYL CITRATE 50 MCG: 50 INJECTION, SOLUTION INTRAMUSCULAR; INTRAVENOUS at 15:15

## 2024-05-08 RX ADMIN — PHENYLEPHRINE HYDROCHLORIDE 20 MCG/MIN: 10 INJECTION INTRAVENOUS at 16:28

## 2024-05-08 RX ADMIN — PROPOFOL 100 MCG/KG/MIN: 10 INJECTION, EMULSION INTRAVENOUS at 16:10

## 2024-05-08 RX ADMIN — FAMOTIDINE 20 MG: 20 TABLET, FILM COATED ORAL at 21:27

## 2024-05-08 RX ADMIN — Medication 3 AMPULE: at 13:49

## 2024-05-08 RX ADMIN — SODIUM CHLORIDE, POTASSIUM CHLORIDE, SODIUM LACTATE AND CALCIUM CHLORIDE: 600; 310; 30; 20 INJECTION, SOLUTION INTRAVENOUS at 16:05

## 2024-05-08 RX ADMIN — PROPOFOL 25 MG: 10 INJECTION, EMULSION INTRAVENOUS at 15:19

## 2024-05-08 RX ADMIN — WATER 2000 MG: 1 INJECTION INTRAMUSCULAR; INTRAVENOUS; SUBCUTANEOUS at 16:22

## 2024-05-08 RX ADMIN — SODIUM CHLORIDE, PRESERVATIVE FREE 10 ML: 5 INJECTION INTRAVENOUS at 23:52

## 2024-05-08 RX ADMIN — SENNOSIDES AND DOCUSATE SODIUM 1 TABLET: 50; 8.6 TABLET ORAL at 21:28

## 2024-05-08 RX ADMIN — PROPOFOL 20 MG: 10 INJECTION, EMULSION INTRAVENOUS at 16:16

## 2024-05-08 RX ADMIN — PROPOFOL 25 MG: 10 INJECTION, EMULSION INTRAVENOUS at 15:22

## 2024-05-08 RX ADMIN — CEFAZOLIN 1000 MG: 1 INJECTION, POWDER, FOR SOLUTION INTRAMUSCULAR; INTRAVENOUS at 23:53

## 2024-05-08 RX ADMIN — ROPIVACAINE HYDROCHLORIDE 20 ML: 5 INJECTION, SOLUTION EPIDURAL; INFILTRATION; PERINEURAL at 15:30

## 2024-05-08 RX ADMIN — GLYCOPYRROLATE 0.2 MG: 0.2 INJECTION, SOLUTION INTRAMUSCULAR; INTRAVENOUS at 16:33

## 2024-05-08 RX ADMIN — ACETAMINOPHEN 1000 MG: 500 TABLET ORAL at 21:28

## 2024-05-08 RX ADMIN — ONDANSETRON HYDROCHLORIDE 4 MG: 2 INJECTION, SOLUTION INTRAMUSCULAR; INTRAVENOUS at 17:10

## 2024-05-08 RX ADMIN — MIDAZOLAM HYDROCHLORIDE 1 MG: 1 INJECTION, SOLUTION INTRAMUSCULAR; INTRAVENOUS at 15:15

## 2024-05-08 RX ADMIN — ACETAMINOPHEN 1000 MG: 500 TABLET ORAL at 13:50

## 2024-05-08 RX ADMIN — DEXAMETHASONE SODIUM PHOSPHATE 8 MG: 4 INJECTION, SOLUTION INTRAMUSCULAR; INTRAVENOUS at 16:23

## 2024-05-08 RX ADMIN — PROPOFOL 20 MG: 10 INJECTION, EMULSION INTRAVENOUS at 16:11

## 2024-05-08 RX ADMIN — DIPHENHYDRAMINE HYDROCHLORIDE 25 MG: 25 CAPSULE ORAL at 21:28

## 2024-05-08 RX ADMIN — PROPOFOL 40 MG: 10 INJECTION, EMULSION INTRAVENOUS at 16:09

## 2024-05-08 RX ADMIN — PHENYLEPHRINE HYDROCHLORIDE 40 MCG/MIN: 10 INJECTION INTRAVENOUS at 16:22

## 2024-05-08 ASSESSMENT — LIFESTYLE VARIABLES: SMOKING_STATUS: 0

## 2024-05-08 ASSESSMENT — PAIN DESCRIPTION - DESCRIPTORS: DESCRIPTORS: PRESSURE

## 2024-05-08 ASSESSMENT — PAIN - FUNCTIONAL ASSESSMENT: PAIN_FUNCTIONAL_ASSESSMENT: 0-10

## 2024-05-08 NOTE — ANESTHESIA PRE PROCEDURE
distal radius and ulna S52.502B, S52.602B       Past Medical History:        Diagnosis Date    Cerebral artery occlusion with cerebral infarction (HCC)     left side weakness and painful    Chronic pain     left side    Essential hypertension     Rheumatoid arthritis (HCC)        Past Surgical History:        Procedure Laterality Date    GYN N/A     c section    HX OPEN REDUCTION INTERNAL FIXATION Left 2020    distal radius    TOTAL KNEE ARTHROPLASTY Left        Social History:    Social History     Tobacco Use    Smoking status: Every Day     Current packs/day: 0.25     Types: Cigarettes    Smokeless tobacco: Never    Tobacco comments:     Quit smokin-7 cigarettes a day   Substance Use Topics    Alcohol use: Yes     Alcohol/week: 7.0 standard drinks of alcohol     Types: 7 Cans of beer per week     Comment: beer each                                Ready to quit: Not Answered  Counseling given: Not Answered  Tobacco comments: Quit smokin-7 cigarettes a day      Vital Signs (Current):   Vitals:    24 1530 24 1333   BP:  (!) 140/86   Pulse:  77   Resp:  12   Temp:  98.7 °F (37.1 °C)   TempSrc:  Oral   SpO2:  95%   Weight: 49.9 kg (110 lb) 44.7 kg (98 lb 8.7 oz)   Height: 1.6 m (5' 3\") 1.6 m (5' 3\")                                              BP Readings from Last 3 Encounters:   24 (!) 140/86       NPO Status: Time of last liquid consumption: 0600 (sips with meds)                        Time of last solid consumption: 1800                        Date of last liquid consumption: 24                        Date of last solid food consumption: 24    BMI:   Wt Readings from Last 3 Encounters:   24 44.7 kg (98 lb 8.7 oz)     Body mass index is 17.46 kg/m².    CBC:   Lab Results   Component Value Date/Time    WBC 8.8 2020 05:01 AM    RBC 3.52 2020 05:01 AM    HGB 11.4 2020 05:01 AM    HCT 35.3 2020 05:01 AM    .3 2020 05:01 AM    RDW 13.8

## 2024-05-08 NOTE — H&P
HISTORY OF PRESENT ILLNESS  Chief Complaint: Pain of the Left Forearm   Age: 74 y.o.    Sex: female   Hand-dominance: Left     History of present illness: Ms. Dumont presents today for evaluation of left elbow fracture.  She fell down 8 steps at the [facility] on 5/1/2024.  Immediate pain. Seen at urgent care and OOC. X-rays done and splinted. She lives alone and cannot care for herself with one arm.     OBJECTIVE  Constitutional:  No acute distress. Her body mass index is 19.84 kg/m².   Eyes:  Sclera are nonicteric.  Respiratory:  No labored breathing.  Cardiovascular:  No marked edema.  Skin:  No marked skin ulcers.  Neurological:  No marked sensory loss noted.  Psychiatric: Alert and oriented x3.  Left arm:  Moderate swelling and bruising  TTP about elbow  Unable to actively straighten arm  SILT  Brisk cap refill       IMAGING / STUDIES  I have independently reviewed and interpreted x-rays of left elbow which demonstrate displaced olecranon fracture.      ASSESSMENT/ Plan  Reviewed diagnosis and imaging. This is an operative fracture. Reviewed risks of surgery including pain, infection, malunion, nonunion, stiffness, neurovascular injury, and anesthesia complications. Informed consent obtained. She would like to proceed with surgery.    She lives alone and cannot safely care for herself with 1 arm. Will admit to allow her to work with OT and PT and if not progressing she will need to go to SNF post op.

## 2024-05-08 NOTE — OP NOTE
OPERATIVE REPORT    FACILITY: [facility]    PATIENT NAME: Afia Dumont     DATE OF OPERATION: 5/8/24    PREOPERATIVE DIAGNOSIS: left olecranon fracture    POSTOPERATIVE DIAGNOSIS: same    SURGERIES PERFORMED:   Open reduction with internal fixation left olecranon fracture    ATTENDING PHYSICIAN: Zach Corcoran MD    ASSISTANT: Joshua Leone    IMPLANTS:  [employer] 4 hole olecranon plate    SPECIMENS:  none    OPERATIVE FINDINGS: stable fixation    ANESTHESIA: general plus block    FLUIDS: Please see anesthesia record    ESTIMATED BLOOD LOSS: 10     TOURNIQUET TIME: 38 min     INDICATIONS FOR PROCEDURE: This patient is a 74 y.o. female who presented to our institution with left displaced olecranon fracture. After a detailed discussion regarding the risks, benefits, and alternatives to surgery, informed consent was obtained. The patient presents now to the operating room for that operative procedure.     DETAILED DESCRIPTION OF PROCEDURE: The patient was identified in preoperative holding. The operative extremity was marked. The patient was then taken back to the operating room where the anesthetic of choice was administered. The patient was positioned on the operating room table taking care to pad all bony prominences. The operative site was prepped and draped in the usual sterile fashion. Appropriate antibiotics were administered and timeouts were performed in keeping with guidelines.     A standard incision made over the proximal ulna and olecranon. Dissection was carried to bone. The fracture was identified and cleaned. The fracture was then reduced with a point to point clamp. Biplanar fluoroscopy was used to confirm anatomic reduction. A 4 hole plate was then fixed to bone and used to apply compression via the plate. The plate was then secured with locking and nonlocking screws. Final x-rays were taken demonstrating safe placement of all hardware. The wounds were irrigated and closed in a layered fashion with 2-0

## 2024-05-08 NOTE — ANESTHESIA PROCEDURE NOTES
Peripheral Block    Patient location during procedure: pre-op  Reason for block: post-op pain management and at surgeon's request  Start time: 5/8/2024 3:15 PM  End time: 5/8/2024 3:30 PM  Staffing  Performed: anesthesiologist   Anesthesiologist: Pablo Naylor MD  Performed by: aPblo Naylor MD  Authorized by: Pablo Naylor MD    Preanesthetic Checklist  Completed: patient identified, IV checked, site marked, risks and benefits discussed, surgical/procedural consents, equipment checked, pre-op evaluation, timeout performed, anesthesia consent given, oxygen available, monitors applied/VS acknowledged and fire risk safety assessment completed and verbalized  Peripheral Block   Patient position: supine  Prep: ChloraPrep  Provider prep: mask and sterile gloves  Patient monitoring: cardiac monitor, continuous pulse ox, frequent blood pressure checks, IV access and oxygen  Block type: Brachial plexus  Supraclavicular  Laterality: left  Injection technique: single-shot  Guidance: ultrasound guided  Local infiltration: ropivacaine  Infiltration strength: 0.5 %  Local infiltration: ropivacaine    Needle   Needle type: insulated echogenic nerve stimulator needle   Needle gauge: 21 G  Needle localization: anatomical landmarks and ultrasound guidance  Needle length: 10 cm  Assessment   Injection assessment: negative aspiration for heme, no paresthesia on injection, local visualized surrounding nerve on ultrasound and no intravascular symptoms  Paresthesia pain: none  Slow fractionated injection: yes  Hemodynamics: stable  Outcomes: uncomplicated    Medications Administered  midazolam (VERSED) injection 2 mg/2mL - IntraVENous   1 mg - 5/8/2024 3:15:00 PM  fentaNYL (SUBLIMAZE) injection - IntraVENous   50 mcg - 5/8/2024 3:15:00 PM  ropivacaine (NAROPIN) injection 0.5% - Perineural   20 mL - 5/8/2024 3:30:00 PM

## 2024-05-08 NOTE — FLOWSHEET NOTE
05/08/24 1740   Handoff   Communication Given Periop Handoff/Relief   Handoff phase Phase I receiving   Handoff Given To Beatriz PACU RN   Handoff Received From Olive OR RN/ Rebeka CRNA   Handoff Communication Face to Face;At bedside   Time Handoff Given 1740        05/08/24 1803   Family/Significant Other Communication   Reason Update   Name Ryan   Relationship Child   Method of Communication Phone       1951: TRANSFER - OUT REPORT:    Verbal report given to LEONEL López on Afia Dumont being transferred to [facility] for routine progression of care       Report consisted of patient’s Situation, Background, Assessment and   Recommendations(SBAR).     Opportunity for questions and clarification was provided.        Sign out received from Dr. Naylor

## 2024-05-08 NOTE — PERIOP NOTE
12:53= LEONEL Godoy, brought to Pre OP via wheelchair; assisted with changing into gown using CHG; mepilex dsg applied to sacrum; warming blanket applied.    13:25= EKG performed. Dr. Corcoran in to see pt; states he will be back to talk to pt.    13:52= ready for OR; declined music, watching tv, call bell in reach.    13:59= Dr. Corcoran in to discuss surgery and royce pt. Unable to remove ring; jewelry consent signed.    14:04= due to swelling, Dr. Corcoran removed ring from left 4th finger.

## 2024-05-09 LAB
EKG ATRIAL RATE: 74 BPM
EKG DIAGNOSIS: NORMAL
EKG P AXIS: 54 DEGREES
EKG P-R INTERVAL: 146 MS
EKG Q-T INTERVAL: 406 MS
EKG QRS DURATION: 80 MS
EKG QTC CALCULATION (BAZETT): 450 MS
EKG R AXIS: 55 DEGREES
EKG T AXIS: 56 DEGREES
EKG VENTRICULAR RATE: 74 BPM

## 2024-05-09 PROCEDURE — 97161 PT EVAL LOW COMPLEX 20 MIN: CPT

## 2024-05-09 PROCEDURE — 6370000000 HC RX 637 (ALT 250 FOR IP): Performed by: ORTHOPAEDIC SURGERY

## 2024-05-09 PROCEDURE — 97116 GAIT TRAINING THERAPY: CPT

## 2024-05-09 PROCEDURE — 97165 OT EVAL LOW COMPLEX 30 MIN: CPT

## 2024-05-09 PROCEDURE — 6370000000 HC RX 637 (ALT 250 FOR IP)

## 2024-05-09 PROCEDURE — APPNB180 APP NON BILLABLE TIME > 60 MINS

## 2024-05-09 PROCEDURE — 2580000003 HC RX 258: Performed by: ORTHOPAEDIC SURGERY

## 2024-05-09 PROCEDURE — 97530 THERAPEUTIC ACTIVITIES: CPT

## 2024-05-09 PROCEDURE — 6360000002 HC RX W HCPCS: Performed by: ORTHOPAEDIC SURGERY

## 2024-05-09 PROCEDURE — 1100000000 HC RM PRIVATE

## 2024-05-09 RX ORDER — ACETAMINOPHEN 500 MG
1000 TABLET ORAL EVERY 8 HOURS SCHEDULED
Qty: 120 TABLET | Refills: 3 | Status: SHIPPED
Start: 2024-05-09

## 2024-05-09 RX ORDER — ATORVASTATIN CALCIUM 10 MG/1
10 TABLET, FILM COATED ORAL DAILY
Status: DISCONTINUED | OUTPATIENT
Start: 2024-05-09 | End: 2024-05-09

## 2024-05-09 RX ORDER — GABAPENTIN 300 MG/1
300 CAPSULE ORAL 3 TIMES DAILY
Qty: 42 CAPSULE | Refills: 0 | Status: SHIPPED | OUTPATIENT
Start: 2024-05-09 | End: 2024-05-23

## 2024-05-09 RX ORDER — ATORVASTATIN CALCIUM 10 MG/1
10 TABLET, FILM COATED ORAL NIGHTLY
Status: DISCONTINUED | OUTPATIENT
Start: 2024-05-09 | End: 2024-05-13 | Stop reason: HOSPADM

## 2024-05-09 RX ORDER — POLYETHYLENE GLYCOL 3350 17 G/17G
17 POWDER, FOR SOLUTION ORAL DAILY
Qty: 14 PACKET | Refills: 0 | Status: SHIPPED
Start: 2024-05-10 | End: 2024-05-24

## 2024-05-09 RX ORDER — TRAMADOL HYDROCHLORIDE 50 MG/1
50-100 TABLET ORAL EVERY 6 HOURS PRN
Qty: 30 TABLET | Refills: 0 | Status: SHIPPED | OUTPATIENT
Start: 2024-05-09 | End: 2024-05-13 | Stop reason: HOSPADM

## 2024-05-09 RX ADMIN — LISINOPRIL 20 MG: 20 TABLET ORAL at 08:07

## 2024-05-09 RX ADMIN — GABAPENTIN 300 MG: 300 CAPSULE ORAL at 08:07

## 2024-05-09 RX ADMIN — KETOROLAC TROMETHAMINE 15 MG: 30 INJECTION, SOLUTION INTRAMUSCULAR at 07:34

## 2024-05-09 RX ADMIN — SODIUM CHLORIDE, PRESERVATIVE FREE 10 ML: 5 INJECTION INTRAVENOUS at 08:10

## 2024-05-09 RX ADMIN — GABAPENTIN 300 MG: 300 CAPSULE ORAL at 13:09

## 2024-05-09 RX ADMIN — POLYETHYLENE GLYCOL 3350 17 G: 17 POWDER, FOR SOLUTION ORAL at 08:07

## 2024-05-09 RX ADMIN — TRAMADOL HYDROCHLORIDE 100 MG: 50 TABLET ORAL at 22:08

## 2024-05-09 RX ADMIN — SENNOSIDES AND DOCUSATE SODIUM 1 TABLET: 50; 8.6 TABLET ORAL at 08:07

## 2024-05-09 RX ADMIN — SODIUM CHLORIDE: 9 INJECTION, SOLUTION INTRAVENOUS at 08:03

## 2024-05-09 RX ADMIN — ACETAMINOPHEN 1000 MG: 500 TABLET ORAL at 20:38

## 2024-05-09 RX ADMIN — ACETAMINOPHEN 1000 MG: 500 TABLET ORAL at 13:09

## 2024-05-09 RX ADMIN — TRAMADOL HYDROCHLORIDE 100 MG: 50 TABLET ORAL at 09:47

## 2024-05-09 RX ADMIN — TRAMADOL HYDROCHLORIDE 50 MG: 50 TABLET ORAL at 03:18

## 2024-05-09 RX ADMIN — MORPHINE SULFATE 2 MG: 2 INJECTION, SOLUTION INTRAMUSCULAR; INTRAVENOUS at 08:05

## 2024-05-09 RX ADMIN — FAMOTIDINE 20 MG: 20 TABLET, FILM COATED ORAL at 20:38

## 2024-05-09 RX ADMIN — SERTRALINE HYDROCHLORIDE 100 MG: 50 TABLET ORAL at 08:07

## 2024-05-09 RX ADMIN — ACETAMINOPHEN 1000 MG: 500 TABLET ORAL at 05:52

## 2024-05-09 RX ADMIN — CEFAZOLIN 1000 MG: 1 INJECTION, POWDER, FOR SOLUTION INTRAMUSCULAR; INTRAVENOUS at 08:05

## 2024-05-09 RX ADMIN — KETOROLAC TROMETHAMINE 15 MG: 30 INJECTION, SOLUTION INTRAMUSCULAR at 13:08

## 2024-05-09 RX ADMIN — SENNOSIDES AND DOCUSATE SODIUM 1 TABLET: 50; 8.6 TABLET ORAL at 20:38

## 2024-05-09 RX ADMIN — FAMOTIDINE 20 MG: 20 TABLET, FILM COATED ORAL at 08:07

## 2024-05-09 RX ADMIN — SODIUM CHLORIDE, PRESERVATIVE FREE 10 ML: 5 INJECTION INTRAVENOUS at 21:00

## 2024-05-09 RX ADMIN — ATORVASTATIN CALCIUM 10 MG: 10 TABLET, FILM COATED ORAL at 20:38

## 2024-05-09 RX ADMIN — TRAMADOL HYDROCHLORIDE 50 MG: 50 TABLET ORAL at 16:33

## 2024-05-09 RX ADMIN — GABAPENTIN 300 MG: 300 CAPSULE ORAL at 20:38

## 2024-05-09 ASSESSMENT — PAIN DESCRIPTION - DESCRIPTORS
DESCRIPTORS: ACHING
DESCRIPTORS: ACHING
DESCRIPTORS: BURNING
DESCRIPTORS: ACHING
DESCRIPTORS: BURNING

## 2024-05-09 ASSESSMENT — PAIN DESCRIPTION - ORIENTATION
ORIENTATION: LEFT

## 2024-05-09 ASSESSMENT — PAIN SCALES - GENERAL
PAINLEVEL_OUTOF10: 8
PAINLEVEL_OUTOF10: 5
PAINLEVEL_OUTOF10: 4
PAINLEVEL_OUTOF10: 6
PAINLEVEL_OUTOF10: 0
PAINLEVEL_OUTOF10: 4
PAINLEVEL_OUTOF10: 6
PAINLEVEL_OUTOF10: 2
PAINLEVEL_OUTOF10: 7
PAINLEVEL_OUTOF10: 0
PAINLEVEL_OUTOF10: 8

## 2024-05-09 ASSESSMENT — PAIN DESCRIPTION - LOCATION
LOCATION: HAND
LOCATION: HAND
LOCATION: ARM
LOCATION: HAND
LOCATION: ARM
LOCATION: ELBOW
LOCATION: HAND
LOCATION: HAND

## 2024-05-09 ASSESSMENT — PAIN - FUNCTIONAL ASSESSMENT
PAIN_FUNCTIONAL_ASSESSMENT: ACTIVITIES ARE NOT PREVENTED
PAIN_FUNCTIONAL_ASSESSMENT: PREVENTS OR INTERFERES SOME ACTIVE ACTIVITIES AND ADLS

## 2024-05-09 NOTE — PROGRESS NOTES
Ortho / Neurosurgery NP Note    POD# 1  s/p LEFT OLECRANON OPEN  REDUCTION INTERNAL FIXATION   Pt seen with no visitor present    Patient in bed. Awake and alert.   Reports pain 7/10 despite tramadol 50 mg  Tearful about level of pain  Tolerating diet. No nausea  Lives alone. Left hand dominant.   Sister may be able to come and help     VSS Afebrile.    Visit Vitals  BP (!) 178/91   Pulse 81   Temp 98.2 °F (36.8 °C) (Oral)   Resp 16   Ht 1.6 m (5' 3\")   Wt 44.7 kg (98 lb 8.7 oz)   SpO2 99%   BMI 17.46 kg/m²       Voiding status: Voiding            Labs    Lab Results   Component Value Date/Time    HGB 11.4 07/24/2020 05:01 AM      Lab Results   Component Value Date/Time    INR 1.0 07/21/2020 04:35 AM      Lab Results   Component Value Date/Time     07/20/2020 04:18 PM    K 4.0 07/20/2020 04:18 PM     07/20/2020 04:18 PM    CO2 27 07/20/2020 04:18 PM    BUN 13 07/20/2020 04:18 PM     Recent Glucose Results:   Glucose   Date Value Ref Range Status   07/20/2020 99 65 - 100 mg/dL Final   05/04/2020 114 (H) 65 - 100 mg/dL Final   06/19/2019 89 65 - 100 mg/dL Final           Body mass index is 17.46 kg/m². : A BMI > 30 is classified as obesity and > 40 is classified as morbid obesity.     Awake and alert. No acute distress.    Dressing: LUE splint  C.D.I.   No significant erythema or swelling  Cryotherapy in place over incision.   LUE sensation intact. Can wiggle fingers. Brisk cap refill.     SCD for mechanical DVT proph while in bed        PLAN:    Activity: PT/OT-NWB LUE in sling  Pain Control: Scheduled Tylenol, Tramadol  mg Q6h. Discussed using Tramadol 100 mg given inadequate relief with 50 mg    Discharge Home vs SNF pending progress with therapy and available help at home.           Addendum@7230-Plan of care discussed during IDR. SNF recommended. Patient will require 3 midnight stay. CM aware. DC to SNF once able.           EZEQUIEL Perez - NP

## 2024-05-09 NOTE — ANESTHESIA POSTPROCEDURE EVALUATION
Department of Anesthesiology  Postprocedure Note    Patient: Afia Dumont  MRN: [id number]  YOB: 1950  Date of evaluation: 5/8/2024    Procedure Summary       Date: 05/08/24 Room / Location: [facility] MAIN OR  / [facility] MAIN OR    Anesthesia Start: 1605 Anesthesia Stop: 1746    Procedure: LEFT OLECRANON OPEN  REDUCTION INTERNAL FIXATION (Left: Elbow) Diagnosis:       Closed displaced intra-articular fracture of olecranon process of left ulna, initial encounter      (Closed displaced intra-articular fracture of olecranon process of left ulna, initial encounter [S52.032A])    Providers: Zach Corcoran MD Responsible Provider: Pablo Naylor MD    Anesthesia Type: Regional, General ASA Status: 2            Anesthesia Type: Regional, General    Ezekiel Phase I: Ezekiel Score: 8    Ezekiel Phase II:      Anesthesia Post Evaluation    Patient location during evaluation: PACU  Patient participation: complete - patient participated  Level of consciousness: sleepy but conscious  Airway patency: patent  Nausea & Vomiting: no nausea and no vomiting  Cardiovascular status: hemodynamically stable  Respiratory status: acceptable and nasal cannula  Hydration status: stable  Multimodal analgesia pain management approach    No notable events documented.

## 2024-05-09 NOTE — DISCHARGE INSTRUCTIONS
Discharge Instructions:  Afia NONA Aldenshaunna    Surgery: Open reduction with internal fixation left olecranon fracture   .        To relieve pain:  Use ice/gel packs.    -Put the ice pack directly over the wound, or anywhere you are hurting or swollen.   -The packs should stay cold for 3-4 hours.  When it is not cold anymore, rotate with the packs in the freezer.      Elevate your Arm  This will also keep swelling down.        To keep track of your pain medications, write down what you take and when you take it.    The last dose of pain medication you got in the hospital was:     Medication    Dose    Date & Time      Choose your medications based on the pain scale below:    To keep your pain under control, take Tylenol every 6 hours for 14 days - even if you feel like you don’t need it.     For mild to moderate pain (4-6 on pain scale), take one pain pill every 4 hours or as instructed.     For severe pain (7-10 on pain scale), take two pain pills every 4 hours or as instructed.     To prevent nausea, take your pain medications with food.                                            Pain Scale              As your pain lessens:    Slowly start taking less pain medication. You may do this by waiting longer between doses or by taking smaller doses.    Stop using the pain medications as soon as you no longer need it, usually in 2-3 weeks.          Mobility is important in blood clot prevention.         Keep your Splint in place. It will be removed by your surgeon during your follow-up appointment in 2 weeks.       You may shower but the splint should not get wet.     DO NOT's:  Do not rub your surgical wound  Do not put lotion or oils on your wound.   Do not take a tub bath or go swimming until your doctor says it is ok.          To increase and promote healing:  Stop Smoking (or at least cut back on smoking).            Eat a well-balanced diet. High in protein and Vitamin C.     If you have a poor appetite, supplement your

## 2024-05-09 NOTE — PLAN OF CARE
Problem: Physical Therapy - Adult  Goal: By Discharge: Performs mobility at highest level of function for planned discharge setting.  See evaluation for individualized goals.  Description: FUNCTIONAL STATUS PRIOR TO ADMISSION: Pt independent PTA, fall immediately PTA resulting in L UE olecranon fracture. Old CVA with L terrence (6y ago per pt)    HOME SUPPORT PRIOR TO ADMISSION: Lives alone in apartment, 7 [address] with rails.     Physical Therapy Goals  Initiated 5/9/2024  1.  Patient will move from supine to sit and sit to supine, scoot up and down, and roll side to side in bed with supervision/set-up within 7 day(s).    2.  Patient will perform sit to stand with supervision/set-up within 7 day(s).  3.  Patient will transfer from bed to chair and chair to bed with supervision/set-up using the least restrictive device within 7 day(s).  4.  Patient will ambulate with contact guard assist for 100 feet with the least restrictive device within 7 day(s).   5.  Patient will ascend/descend 7 stairs with 1 handrail(s) with contact guard assist within 7 day(s).   Outcome: Not Progressing   PHYSICAL THERAPY EVALUATION    Patient: Afia Dumont (74 y.o. female)  Date: 5/9/2024  Primary Diagnosis: Closed displaced intra-articular fracture of olecranon process of left ulna, initial encounter [S52.032A]  Olecranon fracture, left, closed, initial encounter [S52.022A]  Procedure(s) (LRB):  LEFT OLECRANON OPEN  REDUCTION INTERNAL FIXATION (Left) 1 Day Post-Op   Precautions: Restrictions/Precautions: Weight Bearing       Left Upper Extremity Weight Bearing: Non Weight Bearing              ASSESSMENT :   DEFICITS/IMPAIRMENTS:   The patient is limited by decreased functional mobility, strength, activity tolerance, endurance, safety awareness, balance, increased pain levels     Based on the impairments listed above pt received POD 1 s/p L olecranon ORIF. She was received up in chair, L sling in place, agreeable to therapy. Pt performed sit  Cerebral artery occlusion with cerebral infarction (HCC)     left side weakness and painful    Chronic pain     left side    Essential hypertension     Rheumatoid arthritis (HCC)      Past Surgical History:   Procedure Laterality Date    FOREARM SURGERY Left 5/8/2024    LEFT OLECRANON OPEN  REDUCTION INTERNAL FIXATION performed by Zach Corcoran MD at [facility] MAIN OR    GYN N/A     c section    HX OPEN REDUCTION INTERNAL FIXATION Left 05/2020    distal radius    TOTAL KNEE ARTHROPLASTY Left        Home Situation:  Social/Functional History  Lives With: Alone  Type of Home: Apartment  Home Layout: One level  Bathroom Shower/Tub: Tub/Shower unit  Bathroom Toilet: Standard  Bathroom Equipment: Grab bars around toilet (pt sits on edge of tub to bathe)  Bathroom Accessibility: Accessible  Home Equipment: Cane, Walker, rolling, Wheelchair-manual (uses cane sometimes. Son just rented WC after this elbow fx)  Has the patient had two or more falls in the past year or any fall with injury in the past year?: Yes  ADL Assistance: Independent  Homemaking Assistance: Independent  Homemaking Responsibilities: Yes  Ambulation Assistance: Independent  Transfer Assistance: Independent  Active : Yes  Mode of Transportation: Car    Cognitive/Behavioral Status:  Orientation  Overall Orientation Status: Within Functional Limits  Orientation Level: Oriented X4  Cognition  Overall Cognitive Status: Exceptions  Arousal/Alertness: Appropriate responses to stimuli  Following Commands: Follows multistep commands with increased time  Attention Span: Appears intact  Memory: Appears intact  Safety Judgement: Decreased awareness of need for assistance  Problem Solving: Assistance required to implement solutions  Insights: Fully aware of deficits  Initiation: Requires cues for some  Sequencing: Requires cues for some  Cognition Comment: Crying consistently throughout session    Skin: L cast dry and intact throughout, in sling

## 2024-05-09 NOTE — PLAN OF CARE
Problem: Occupational Therapy - Adult  Goal: By Discharge: Performs self-care activities at highest level of function for planned discharge setting.  See evaluation for individualized goals.  Description: FUNCTIONAL STATUS PRIOR TO ADMISSION:  Patient was ambulatory without DME. Used cane on occasion. Pt had CVA approx 6 yrs ago with limited LUE ROM. (Pt was left hand dominant). Now uses R hand for all fine motor tasks.  Pt has had numbness/tingling of LUE since CVA 6 yrs ago. Pt has carpal tunnel of RUE.  Pt's son lives nearby. Post discharge, pt's 2 sisters will be nearby (in the house?).      , ADL Assistance: Independent,  ,  ,  ,  ,  , Homemaking Assistance: Independent, Ambulation Assistance: Independent, Transfer Assistance: Independent, Active : Yes     HOME SUPPORT: Patient lived alone with no local support.    Occupational Therapy Goals:  Initiated 5/9/2024  1.  Patient will perform grooming standing with Stand by Assist within 7 day(s).  2.  Patient will perform upper body dressing with Contact Guard Assist within 7 day(s).  3.  Patient will perform lower body dressing with Contact Guard Assist within 7 day(s).  4.  Patient will perform toilet transfers with Contact Guard Assist  within 7 day(s).  5.  Patient will perform all aspects of toileting with Contact Guard Assist within 7 day(s).  6.  Patient will participate in upper extremity therapeutic exercise/activities with Contact Guard Assist for 10 minutes within 7 day(s).    7.  Patient will utilize energy conservation techniques during functional activities with verbal, visual, and tactile cues within 7 day(s).    Outcome: Progressing     OCCUPATIONAL THERAPY EVALUATION    Patient: Afia Dumont (74 y.o. female)  Date: 5/9/2024  Primary Diagnosis: Closed displaced intra-articular fracture of olecranon process of left ulna, initial encounter [S52.032A]  Olecranon fracture, left, closed, initial encounter [S52.022A]  Procedure(s) (LRB):  LEFT  session)  Education Outcome: Verbalized understanding;Continued education needed    Thank you for this referral.  Hyun Quinones OT  Minutes: 30    Occupational Therapy Evaluation Charge Determination   History Examination Decision-Making   LOW Complexity : Brief history review  LOW Complexity: 1-3 Performance deficits relating to physical, cognitive, or psychosocial skills that result in activity limitations and/or participation restrictions LOW Complexity: No comorbidities that affect functional and  no verbal  or physical assist needed to complete eval tasks   Based on the above components, the patient evaluation is determined to be of the following complexity level: Low

## 2024-05-09 NOTE — CARE COORDINATION
Met with pt at bedside to discuss recommendation for SNF rehab before transitioning home after surgery, pt verbalized agreement with this recommendation, stated she has \"been here before\". Pt gave preference for [facility], stated that she would like a private room if possible. CM validated this preference; asked pt if she would like to consider other facilities if [facility] unable to accommodate private room request, pt stated she would consider it but would like to proceed with [facility] at present; will discuss further when [facility] acceptance and bed availability known.    Cassi Gonzales, [facility] [place]  [phone]

## 2024-05-09 NOTE — PROGRESS NOTES
Comprehensive Nutrition Assessment    Type and Reason for Visit:  Initial (low BMI)    Nutrition Recommendations/Plan:   Continue Regular diet  Add ensure plus high protein daily and magic cups BID     Malnutrition Assessment:  Malnutrition Status:  Moderate malnutrition (05/09/24 1324)    Context:  Chronic Illness     Findings of the 6 clinical characteristics of malnutrition:  Energy Intake:  Mild decrease in energy intake (Comment)  Weight Loss:  Greater than 10% over 6 months     Body Fat Loss:  Mild body fat loss Orbital   Muscle Mass Loss:  Unable to assess    Fluid Accumulation:  No significant fluid accumulation     Strength:  Not Performed    Nutrition Assessment:    Patient medically noted for left olecranon ORIF. Receiving a regular diet. Chart reviewed for low BMI. Patient reports she has to force herself to eat because she doesn't feel hungry; this has been going on for a while. She reports a UBW of ~110# but current weight down to 98#. She states she tries to eat ice cream and high kcal foods at home. She tried an ensure this morning and liked it and is agreeable to trying magic cups as well. Continue liberalized, regular diet. Menu provided. Encouraged intake of meals.     Nutrition Related Findings:    Labs and medications reviewed   Wound Type: Surgical Incision       Current Nutrition Intake & Therapies:    Average Meal Intake: 26-50%  Average Supplements Intake: None Ordered  ADULT DIET; Regular    Anthropometric Measures:  Height: 160 cm (5' 3\")  Ideal Body Weight (IBW): 115 lbs (52 kg)       Current Body Weight: 44.7 kg (98 lb 8.7 oz),   IBW.    Current BMI (kg/m2): 17.5                          BMI Categories: Underweight (BMI less than 18.5)    Estimated Daily Nutrient Needs:  Energy Requirements Based On: Kcal/kg  Weight Used for Energy Requirements: Current  Energy (kcal/day): 9425-7853 kcals (30-35 kcals/kg bw)  Weight Used for Protein Requirements: Current  Protein (g/day): 58-67g

## 2024-05-10 PROCEDURE — 97116 GAIT TRAINING THERAPY: CPT

## 2024-05-10 PROCEDURE — 97535 SELF CARE MNGMENT TRAINING: CPT

## 2024-05-10 PROCEDURE — 6370000000 HC RX 637 (ALT 250 FOR IP)

## 2024-05-10 PROCEDURE — 6370000000 HC RX 637 (ALT 250 FOR IP): Performed by: ORTHOPAEDIC SURGERY

## 2024-05-10 PROCEDURE — 2580000003 HC RX 258: Performed by: ORTHOPAEDIC SURGERY

## 2024-05-10 PROCEDURE — 6360000002 HC RX W HCPCS: Performed by: ORTHOPAEDIC SURGERY

## 2024-05-10 PROCEDURE — APPNB15 APP NON BILLABLE TIME 0-15 MINS: Performed by: PHYSICIAN ASSISTANT

## 2024-05-10 PROCEDURE — 1100000000 HC RM PRIVATE

## 2024-05-10 RX ADMIN — TRAMADOL HYDROCHLORIDE 50 MG: 50 TABLET ORAL at 11:42

## 2024-05-10 RX ADMIN — SENNOSIDES AND DOCUSATE SODIUM 1 TABLET: 50; 8.6 TABLET ORAL at 07:48

## 2024-05-10 RX ADMIN — SERTRALINE HYDROCHLORIDE 100 MG: 50 TABLET ORAL at 07:48

## 2024-05-10 RX ADMIN — KETOROLAC TROMETHAMINE 15 MG: 30 INJECTION, SOLUTION INTRAMUSCULAR at 21:55

## 2024-05-10 RX ADMIN — POLYETHYLENE GLYCOL 3350 17 G: 17 POWDER, FOR SOLUTION ORAL at 07:48

## 2024-05-10 RX ADMIN — SENNOSIDES AND DOCUSATE SODIUM 1 TABLET: 50; 8.6 TABLET ORAL at 21:55

## 2024-05-10 RX ADMIN — SODIUM CHLORIDE, PRESERVATIVE FREE 10 ML: 5 INJECTION INTRAVENOUS at 07:53

## 2024-05-10 RX ADMIN — ACETAMINOPHEN 1000 MG: 500 TABLET ORAL at 13:43

## 2024-05-10 RX ADMIN — KETOROLAC TROMETHAMINE 15 MG: 30 INJECTION, SOLUTION INTRAMUSCULAR at 14:23

## 2024-05-10 RX ADMIN — TRAMADOL HYDROCHLORIDE 100 MG: 50 TABLET ORAL at 05:26

## 2024-05-10 RX ADMIN — GABAPENTIN 300 MG: 300 CAPSULE ORAL at 07:49

## 2024-05-10 RX ADMIN — LISINOPRIL 20 MG: 20 TABLET ORAL at 07:48

## 2024-05-10 RX ADMIN — FAMOTIDINE 20 MG: 20 TABLET, FILM COATED ORAL at 07:48

## 2024-05-10 RX ADMIN — TRAMADOL HYDROCHLORIDE 50 MG: 50 TABLET ORAL at 21:55

## 2024-05-10 RX ADMIN — GABAPENTIN 300 MG: 300 CAPSULE ORAL at 13:43

## 2024-05-10 RX ADMIN — GABAPENTIN 300 MG: 300 CAPSULE ORAL at 21:55

## 2024-05-10 RX ADMIN — ACETAMINOPHEN 1000 MG: 500 TABLET ORAL at 05:21

## 2024-05-10 RX ADMIN — ATORVASTATIN CALCIUM 10 MG: 10 TABLET, FILM COATED ORAL at 21:55

## 2024-05-10 RX ADMIN — ACETAMINOPHEN 1000 MG: 500 TABLET ORAL at 21:54

## 2024-05-10 ASSESSMENT — PAIN SCALES - GENERAL
PAINLEVEL_OUTOF10: 4
PAINLEVEL_OUTOF10: 0
PAINLEVEL_OUTOF10: 6
PAINLEVEL_OUTOF10: 4
PAINLEVEL_OUTOF10: 7
PAINLEVEL_OUTOF10: 6

## 2024-05-10 ASSESSMENT — PAIN DESCRIPTION - DESCRIPTORS
DESCRIPTORS: ACHING
DESCRIPTORS: ACHING;DISCOMFORT
DESCRIPTORS: ACHING;DISCOMFORT
DESCRIPTORS: ACHING
DESCRIPTORS: ACHING

## 2024-05-10 ASSESSMENT — PAIN DESCRIPTION - ORIENTATION
ORIENTATION: LEFT
ORIENTATION: LEFT
ORIENTATION: RIGHT
ORIENTATION: LEFT
ORIENTATION: RIGHT

## 2024-05-10 ASSESSMENT — PAIN DESCRIPTION - LOCATION
LOCATION: ARM;WRIST;LEG
LOCATION: ARM
LOCATION: LEG;ARM
LOCATION: ARM
LOCATION: WRIST;ARM;ELBOW
LOCATION: ARM
LOCATION: ARM

## 2024-05-10 ASSESSMENT — PAIN - FUNCTIONAL ASSESSMENT: PAIN_FUNCTIONAL_ASSESSMENT: ACTIVITIES ARE NOT PREVENTED

## 2024-05-10 ASSESSMENT — PAIN DESCRIPTION - ONSET
ONSET: GRADUAL
ONSET: GRADUAL

## 2024-05-10 ASSESSMENT — PAIN DESCRIPTION - FREQUENCY
FREQUENCY: INTERMITTENT
FREQUENCY: INTERMITTENT

## 2024-05-10 NOTE — CARE COORDINATION
1000 - CM updated pt on planned [facility] admission at bedside, pt verbalized understanding and continued preference for this facility. [facility] admissions liaison has contacted CM twice today to change room assignment, CM will confirm room assignment in the morning.    09 - Spoke with [facility] admissions, plan is for pt to go to room 304A tomorrow once 3 inpatient midnights have been verified. CM will update pt.    Initial note - [facility] has accepted, CM requested anticipated bed availability, will update.     Cassi Gonzales [facility] [place]  [facility]  [phone]

## 2024-05-10 NOTE — PLAN OF CARE
Problem: Physical Therapy - Adult  Goal: By Discharge: Performs mobility at highest level of function for planned discharge setting.  See evaluation for individualized goals.  Description: FUNCTIONAL STATUS PRIOR TO ADMISSION: Pt independent PTA, fall immediately PTA resulting in L UE olecranon fracture. Old CVA with L terrence (6y ago per pt)    HOME SUPPORT PRIOR TO ADMISSION: Lives alone in apartment, 7 [address] with rails.     Physical Therapy Goals  Initiated 5/9/2024  1.  Patient will move from supine to sit and sit to supine, scoot up and down, and roll side to side in bed with supervision/set-up within 7 day(s).    2.  Patient will perform sit to stand with supervision/set-up within 7 day(s).  3.  Patient will transfer from bed to chair and chair to bed with supervision/set-up using the least restrictive device within 7 day(s).  4.  Patient will ambulate with contact guard assist for 100 feet with the least restrictive device within 7 day(s).   5.  Patient will ascend/descend 7 stairs with 1 handrail(s) with contact guard assist within 7 day(s).   5/9/2024 1501 by Kourtney Galdamez, PT  Outcome: Not Progressing     Problem: Physical Therapy - Adult  Goal: By Discharge: Performs mobility at highest level of function for planned discharge setting.  See evaluation for individualized goals.  Description: FUNCTIONAL STATUS PRIOR TO ADMISSION: Pt independent PTA, fall immediately PTA resulting in L UE olecranon fracture. Old CVA with L terrence (6y ago per pt)    HOME SUPPORT PRIOR TO ADMISSION: Lives alone in apartment, 7 [address] with rails.     Physical Therapy Goals  Initiated 5/9/2024  1.  Patient will move from supine to sit and sit to supine, scoot up and down, and roll side to side in bed with supervision/set-up within 7 day(s).    2.  Patient will perform sit to stand with supervision/set-up within 7 day(s).  3.  Patient will transfer from bed to chair and chair to bed with supervision/set-up using the least restrictive

## 2024-05-10 NOTE — PROGRESS NOTES
ORTHO - Progress Note  Post Op day: 2 Days Post-Op    Afia Dumont     [id number]  female    74 y.o.    1950    Admit date:2024  Date of Surgery:  Procedures:Procedure(s):  LEFT OLECRANON OPEN  REDUCTION INTERNAL FIXATION  Surgeon:Surgeon(s) and Role:   * Zach Corcoran MD - Primary        SUBJECTIVE:     Afia Dumont is a 74 y.o. female resting in the bed..  Patient has complaints of appropriate post-op pain, tolerating PO pain medications--Ultram  Denies F/C, nausea, vomiting, dizziness, lightheadedness, chest pain, or shortness of breath.    OBJECTIVE:       Physical Exam:  General: alert, cooperative, no distress. Pt able to mobilize in her room  Gastrointestinal:  non-distended .    Cardiovascular: equal pulses in the upper extremities,  Brisk cap refill in all distal extremities   Genitourinary: Voiding independently   Respiratory: No respiratory distress   Neurological:Neurovascular exam within normal limits.     Senstion intact: LE bilat.    Motor: + finger flex/ext  Dressing/Wound:  LUE long arm splint--Clean, dry and intact. No significant erythema or swelling.  Ecchy of the left thumb  Vital Signs:       Patient Vitals for the past 8 hrs:   BP Temp Temp src Pulse Resp SpO2   05/10/24 1543 (!) 151/81 98.8 °F (37.1 °C) -- 70 18 96 %   05/10/24 1225 (!) 141/85 98.6 °F (37 °C) Oral 65 18 97 %   05/10/24 1212 -- -- -- -- 18 --   05/10/24 1142 -- -- -- -- 18 --                                          Temp (24hrs), Av.5 °F (36.9 °C), Min:98.1 °F (36.7 °C), Max:98.8 °F (37.1 °C)    Date 05/10/24 0000 - 05/10/24 2359   Shift 5570-2923 8493-5393 0134-4016 24 Hour Total   INTAKE   P.O. 240   240   Shift Total(mL/kg) 240(5.4)   240(5.4)   OUTPUT   Shift Total(mL/kg)       Weight (kg) 44.7 44.7 44.7 44.7     Labs:      No results for input(s): \"HCT\", \"HGB\", \"INR\" in the last 72 hours.  PT/OT:              ASSESSMENT / PLAN:   Principal Problem:    Olecranon fracture, left, closed, initial

## 2024-05-10 NOTE — PLAN OF CARE
Problem: Physical Therapy - Adult  Goal: By Discharge: Performs mobility at highest level of function for planned discharge setting.  See evaluation for individualized goals.  Description: FUNCTIONAL STATUS PRIOR TO ADMISSION: Pt independent PTA, fall immediately PTA resulting in L UE olecranon fracture. Old CVA with L terrence (6y ago per pt)    HOME SUPPORT PRIOR TO ADMISSION: Lives alone in apartment, 7 [address] with rails.     Physical Therapy Goals  Initiated 5/9/2024  1.  Patient will move from supine to sit and sit to supine, scoot up and down, and roll side to side in bed with supervision/set-up within 7 day(s).    2.  Patient will perform sit to stand with supervision/set-up within 7 day(s).  3.  Patient will transfer from bed to chair and chair to bed with supervision/set-up using the least restrictive device within 7 day(s).  4.  Patient will ambulate with contact guard assist for 100 feet with the least restrictive device within 7 day(s).   5.  Patient will ascend/descend 7 stairs with 1 handrail(s) with contact guard assist within 7 day(s).   Outcome: Progressing   PHYSICAL THERAPY TREATMENT    Patient: Afia Dumont (74 y.o. female)  Date: 5/10/2024  Diagnosis: Closed displaced intra-articular fracture of olecranon process of left ulna, initial encounter [S52.032A]  Olecranon fracture, left, closed, initial encounter [S52.022A] Olecranon fracture, left, closed, initial encounter  Procedure(s) (LRB):  LEFT OLECRANON OPEN  REDUCTION INTERNAL FIXATION (Left) 2 Days Post-Op  Precautions: Weight Bearing       Left Upper Extremity Weight Bearing: Non Weight Bearing              ASSESSMENT:  Patient continues to benefit from skilled PT services and is slowly progressing towards goals. She demonstrates the ability to ambulate with CGA demonstrating increased tone in the L LE. Patient must perform step to gait pattern when not being provided one HHA. Patient declines use of an AD such as SPC at this time due to right

## 2024-05-10 NOTE — PLAN OF CARE
Problem: Occupational Therapy - Adult  Goal: By Discharge: Performs self-care activities at highest level of function for planned discharge setting.  See evaluation for individualized goals.  Description: FUNCTIONAL STATUS PRIOR TO ADMISSION:  Patient was ambulatory without DME. Used cane on occasion. Pt had CVA approx 6 yrs ago with limited LUE ROM. (Pt was left hand dominant). Now uses R hand for all fine motor tasks.  Pt has had numbness/tingling of LUE since CVA 6 yrs ago. Pt has carpal tunnel of RUE.  Pt's son lives nearby. Post discharge, pt's 2 sisters will be nearby (in the house?).      , ADL Assistance: Independent,  ,  ,  ,  ,  , Homemaking Assistance: Independent, Ambulation Assistance: Independent, Transfer Assistance: Independent, Active : Yes     HOME SUPPORT: Patient lived alone with no local support.    Occupational Therapy Goals:  Initiated 5/9/2024  1.  Patient will perform grooming standing with Stand by Assist within 7 day(s).  2.  Patient will perform upper body dressing with Contact Guard Assist within 7 day(s).  3.  Patient will perform lower body dressing with Contact Guard Assist within 7 day(s).  4.  Patient will perform toilet transfers with Contact Guard Assist  within 7 day(s).  5.  Patient will perform all aspects of toileting with Contact Guard Assist within 7 day(s).  6.  Patient will participate in upper extremity therapeutic exercise/activities with Contact Guard Assist for 10 minutes within 7 day(s).    7.  Patient will utilize energy conservation techniques during functional activities with verbal, visual, and tactile cues within 7 day(s).    Outcome: Progressing   OCCUPATIONAL THERAPY TREATMENT  Patient: Afia Dumont (74 y.o. female)  Date: 5/10/2024  Primary Diagnosis: Closed displaced intra-articular fracture of olecranon process of left ulna, initial encounter [S52.032A]  Olecranon fracture, left, closed, initial encounter [S52.022A]  Procedure(s) (LRB):  LEFT  OLECRANON OPEN  REDUCTION INTERNAL FIXATION (Left) 2 Days Post-Op   Precautions: Weight Bearing       Left Upper Extremity Weight Bearing: Non Weight Bearing        Chart, occupational therapy assessment, plan of care, and goals were reviewed.    ASSESSMENT  Patient continues to benefit from skilled OT services and is progressing towards goals. Pt received resting in bed, alert and oriented x4. Pt c/o 6/10 LUE pain, sling off. Pt able to maintain NWB to LUE with functional tasks. Pt overall CGA for functional transfers and CGA without AD to perform functional mobility. Pt tolerated light grooming at sink with CGA. Pt does demonstrate impaired balance with all standing ADLs, mainly due to baseline LLE hypertonicity. Pt with h/o frequent falls and lives alone; continue to recommend rehab at discharge. Pt left seated in chair, refused chair alarm; RN immediately notified and pt educated on using call bell and risk of falls; pt verbalized understanding.       PLAN :  Patient continues to benefit from skilled intervention to address the above impairments.  Continue treatment per established plan of care to address goals.    Recommend with staff: 1 assist for OOB ADLs    Recommend next OT session: POC progression    Recommendation for discharge: (in order for the patient to meet his/her long term goals): rehab    Other factors to consider for discharge: lives alone, impaired cognition, high risk for falls, and new weight bearing restrictions limiting activity or patient is unable to maintain    IF patient discharges home will need the following DME: continuing to assess with progress       SUBJECTIVE:   Patient stated “I don't want to do anything really.”    OBJECTIVE DATA SUMMARY:   Cognitive/Behavioral Status:  Orientation  Overall Orientation Status: Within Normal Limits  Cognition  Overall Cognitive Status: Exceptions  Arousal/Alertness: Appropriate responses to stimuli  Following Commands: Follows multistep commands

## 2024-05-10 NOTE — PLAN OF CARE
Problem: Chronic Conditions and Co-morbidities  Goal: Patient's chronic conditions and co-morbidity symptoms are monitored and maintained or improved  Outcome: Progressing     Problem: Pain  Goal: Verbalizes/displays adequate comfort level or baseline comfort level  Outcome: Progressing     Problem: Safety - Adult  Goal: Free from fall injury  Outcome: Progressing     Problem: ABCDS Injury Assessment  Goal: Absence of physical injury  Outcome: Progressing     Problem: Skin/Tissue Integrity  Goal: Absence of new skin breakdown  Description: 1.  Monitor for areas of redness and/or skin breakdown  2.  Assess vascular access sites hourly  3.  Every 4-6 hours minimum:  Change oxygen saturation probe site  4.  Every 4-6 hours:  If on nasal continuous positive airway pressure, respiratory therapy assess nares and determine need for appliance change or resting period.  Outcome: Progressing     Problem: Occupational Therapy - Adult  Goal: By Discharge: Performs self-care activities at highest level of function for planned discharge setting.  See evaluation for individualized goals.  Description: FUNCTIONAL STATUS PRIOR TO ADMISSION:  Patient was ambulatory without DME. Used cane on occasion. Pt had CVA approx 6 yrs ago with limited LUE ROM. (Pt was left hand dominant). Now uses R hand for all fine motor tasks.  Pt has had numbness/tingling of LUE since CVA 6 yrs ago. Pt has carpal tunnel of RUE.  Pt's son lives nearby. Post discharge, pt's 2 sisters will be nearby (in the house?).      , ADL Assistance: Independent,  ,  ,  ,  ,  , Homemaking Assistance: Independent, Ambulation Assistance: Independent, Transfer Assistance: Independent, Active : Yes     HOME SUPPORT: Patient lived alone with no local support.    Occupational Therapy Goals:  Initiated 5/9/2024  1.  Patient will perform grooming standing with Stand by Assist within 7 day(s).  2.  Patient will perform upper body dressing with Contact Guard Assist within 7

## 2024-05-11 PROCEDURE — 6360000002 HC RX W HCPCS: Performed by: ORTHOPAEDIC SURGERY

## 2024-05-11 PROCEDURE — 6370000000 HC RX 637 (ALT 250 FOR IP)

## 2024-05-11 PROCEDURE — 6370000000 HC RX 637 (ALT 250 FOR IP): Performed by: ORTHOPAEDIC SURGERY

## 2024-05-11 PROCEDURE — 6370000000 HC RX 637 (ALT 250 FOR IP): Performed by: PHYSICIAN ASSISTANT

## 2024-05-11 PROCEDURE — 1100000000 HC RM PRIVATE

## 2024-05-11 PROCEDURE — 2580000003 HC RX 258: Performed by: ORTHOPAEDIC SURGERY

## 2024-05-11 RX ORDER — OXYCODONE HYDROCHLORIDE 5 MG/1
5 TABLET ORAL EVERY 4 HOURS PRN
Status: DISCONTINUED | OUTPATIENT
Start: 2024-05-11 | End: 2024-05-11

## 2024-05-11 RX ORDER — HYDROCODONE BITARTRATE AND ACETAMINOPHEN 5; 325 MG/1; MG/1
1 TABLET ORAL EVERY 6 HOURS PRN
Status: DISCONTINUED | OUTPATIENT
Start: 2024-05-11 | End: 2024-05-13 | Stop reason: HOSPADM

## 2024-05-11 RX ADMIN — GABAPENTIN 300 MG: 300 CAPSULE ORAL at 09:39

## 2024-05-11 RX ADMIN — HYDROCODONE BITARTRATE AND ACETAMINOPHEN 1 TABLET: 5; 325 TABLET ORAL at 21:19

## 2024-05-11 RX ADMIN — ACETAMINOPHEN 1000 MG: 500 TABLET ORAL at 05:43

## 2024-05-11 RX ADMIN — GABAPENTIN 300 MG: 300 CAPSULE ORAL at 14:04

## 2024-05-11 RX ADMIN — ATORVASTATIN CALCIUM 10 MG: 10 TABLET, FILM COATED ORAL at 21:13

## 2024-05-11 RX ADMIN — TRAMADOL HYDROCHLORIDE 100 MG: 50 TABLET ORAL at 02:59

## 2024-05-11 RX ADMIN — TRAMADOL HYDROCHLORIDE 100 MG: 50 TABLET ORAL at 09:39

## 2024-05-11 RX ADMIN — KETOROLAC TROMETHAMINE 15 MG: 30 INJECTION, SOLUTION INTRAMUSCULAR at 10:43

## 2024-05-11 RX ADMIN — SERTRALINE HYDROCHLORIDE 100 MG: 50 TABLET ORAL at 09:38

## 2024-05-11 RX ADMIN — FAMOTIDINE 20 MG: 20 TABLET, FILM COATED ORAL at 09:39

## 2024-05-11 RX ADMIN — OXYCODONE 5 MG: 5 TABLET ORAL at 11:35

## 2024-05-11 RX ADMIN — KETOROLAC TROMETHAMINE 15 MG: 30 INJECTION, SOLUTION INTRAMUSCULAR at 23:20

## 2024-05-11 RX ADMIN — SODIUM CHLORIDE, PRESERVATIVE FREE 10 ML: 5 INJECTION INTRAVENOUS at 21:15

## 2024-05-11 RX ADMIN — GABAPENTIN 300 MG: 300 CAPSULE ORAL at 21:13

## 2024-05-11 RX ADMIN — SENNOSIDES AND DOCUSATE SODIUM 1 TABLET: 50; 8.6 TABLET ORAL at 21:14

## 2024-05-11 RX ADMIN — FAMOTIDINE 20 MG: 20 TABLET, FILM COATED ORAL at 21:13

## 2024-05-11 RX ADMIN — LISINOPRIL 20 MG: 20 TABLET ORAL at 09:39

## 2024-05-11 RX ADMIN — ACETAMINOPHEN 1000 MG: 500 TABLET ORAL at 14:04

## 2024-05-11 ASSESSMENT — PAIN SCALES - GENERAL
PAINLEVEL_OUTOF10: 8
PAINLEVEL_OUTOF10: 5
PAINLEVEL_OUTOF10: 6
PAINLEVEL_OUTOF10: 6
PAINLEVEL_OUTOF10: 4
PAINLEVEL_OUTOF10: 6
PAINLEVEL_OUTOF10: 6
PAINLEVEL_OUTOF10: 7

## 2024-05-11 ASSESSMENT — PAIN DESCRIPTION - DESCRIPTORS
DESCRIPTORS: ACHING
DESCRIPTORS: ACHING;NAGGING
DESCRIPTORS: ACHING;NAGGING
DESCRIPTORS: ACHING

## 2024-05-11 ASSESSMENT — PAIN - FUNCTIONAL ASSESSMENT
PAIN_FUNCTIONAL_ASSESSMENT: ACTIVITIES ARE NOT PREVENTED

## 2024-05-11 ASSESSMENT — PAIN DESCRIPTION - LOCATION
LOCATION: ARM
LOCATION: ELBOW
LOCATION: ARM
LOCATION: ELBOW

## 2024-05-11 ASSESSMENT — PAIN DESCRIPTION - ORIENTATION
ORIENTATION: LEFT

## 2024-05-11 ASSESSMENT — PAIN DESCRIPTION - FREQUENCY: FREQUENCY: INTERMITTENT

## 2024-05-11 ASSESSMENT — PAIN DESCRIPTION - ONSET: ONSET: GRADUAL

## 2024-05-11 NOTE — PLAN OF CARE
Problem: Chronic Conditions and Co-morbidities  Goal: Patient's chronic conditions and co-morbidity symptoms are monitored and maintained or improved  5/10/2024 2349 by Roxanne Arambula LPN  Outcome: Progressing  5/10/2024 1520 by Ashlie Pettit RN  Outcome: Progressing     Problem: Pain  Goal: Verbalizes/displays adequate comfort level or baseline comfort level  5/10/2024 2349 by Roxanne Arambula LPN  Outcome: Progressing  5/10/2024 1520 by Ashlie Pettit RN  Outcome: Progressing     Problem: Safety - Adult  Goal: Free from fall injury  5/10/2024 2349 by Roxanne Arambula LPN  Outcome: Progressing  5/10/2024 1520 by Ashlie Pettit RN  Outcome: Progressing     Problem: ABCDS Injury Assessment  Goal: Absence of physical injury  5/10/2024 2349 by Roxanne Arambula LPN  Outcome: Progressing  5/10/2024 1520 by Ashlie Pettit RN  Outcome: Progressing     Problem: Skin/Tissue Integrity  Goal: Absence of new skin breakdown  Description: 1.  Monitor for areas of redness and/or skin breakdown  2.  Assess vascular access sites hourly  3.  Every 4-6 hours minimum:  Change oxygen saturation probe site  4.  Every 4-6 hours:  If on nasal continuous positive airway pressure, respiratory therapy assess nares and determine need for appliance change or resting period.  5/10/2024 2349 by Roxanne Arambula LPN  Outcome: Progressing  5/10/2024 1520 by Ashlie Pettit RN  Outcome: Progressing     Problem: Occupational Therapy - Adult  Goal: By Discharge: Performs self-care activities at highest level of function for planned discharge setting.  See evaluation for individualized goals.  Description: FUNCTIONAL STATUS PRIOR TO ADMISSION:  Patient was ambulatory without DME. Used cane on occasion. Pt had CVA approx 6 yrs ago with limited LUE ROM. (Pt was left hand dominant). Now uses R hand for all fine motor tasks.  Pt has had numbness/tingling of LUE since CVA 6 yrs ago. Pt has carpal tunnel of RUE.  Pt's son lives nearby. Post discharge,  perform sit to stand with supervision/set-up within 7 day(s).  3.  Patient will transfer from bed to chair and chair to bed with supervision/set-up using the least restrictive device within 7 day(s).  4.  Patient will ambulate with contact guard assist for 100 feet with the least restrictive device within 7 day(s).   5.  Patient will ascend/descend 7 stairs with 1 handrail(s) with contact guard assist within 7 day(s).   5/10/2024 1029 by Sudha Garg, PT  Outcome: Progressing

## 2024-05-11 NOTE — PROGRESS NOTES
End of Shift Note    Bedside shift change report given to Linh CASTANEDA (oncoming nurse) by Roxanne Arambula LPN (offgoing nurse).  Report included the following information SBAR, Kardex, Procedure Summary, Intake/Output, and MAR    Shift worked:  night     Shift summary and any significant changes:    Pt hypertensive   Pain managed w/ 100mg Tramadol and 0.5 Toradol  Up to bathroom x1 assist, unsteady gait       Concerns for physician to address:  none     Zone phone for oncoming shift:  [phone]       Activity:     Number times ambulated in hallways past shift: 0  Number of times OOB to chair past shift: 3 (Bathroom)    Cardiac:   Cardiac Monitoring: No           Access:  Current line(s): PIV     Genitourinary:   Urinary status: voiding    Respiratory:      Chronic home O2 use?: NO  Incentive spirometer at bedside: YES       GI:     Current diet:  ADULT ORAL NUTRITION SUPPLEMENT; Breakfast; Standard High Calorie/High Protein Oral Supplement  ADULT ORAL NUTRITION SUPPLEMENT; Lunch, Dinner; Frozen Oral Supplement  ADULT DIET; Regular  Passing flatus: YES  Tolerating current diet: YES       Pain Management:   Patient states pain is manageable on current regimen: YES    Skin:     Interventions: increase time out of bed, PT/OT consult, limit briefs, and internal/external urinary devices    Patient Safety:  Fall Score:    Interventions: bed/chair alarm, assistive device (walker, cane. etc), gripper socks, pt to call before getting OOB, and stay with me (per policy)       Length of Stay:  Expected LOS: 3  Actual LOS: 3      Roxanne Arambula LPN

## 2024-05-11 NOTE — PROGRESS NOTES
Orthopedic Surgery NP Note    POD# 3  s/p LEFT OLECRANON OPEN  REDUCTION INTERNAL FIXATION   Pt seen with no visitor present  Patient in bed on her computer  No complaints of pain, Tolerating PO  VSS Afebrile.    Visit Vitals  BP (!) 147/85   Pulse 77   Temp 98.1 °F (36.7 °C)   Resp 19   Ht 1.6 m (5' 3\")   Wt 44.7 kg (98 lb 8.7 oz)   SpO2 93%   BMI 17.46 kg/m²       Voiding status: voiding independently       Body mass index is 17.46 kg/m². : A BMI > 30 is classified as obesity and > 40 is classified as morbid obesity.     Awake and alert. No acute distress.    Dressing: L arm splint, clean, dry, and intact  No significant erythema or swelling  Neurological/Neurovascular exam within normal limits.                Senstion intact: LE bilat.               Motor: + finger flex/ext    PLAN:  1) PT/OT. NWB LUE.   2) SCD for mechanical DVT Prophylaxis   3) GI Prophylaxis - pepcid  4) Pain control - scheduled tylenol, and prn oxy    5) Readiness for discharge:     [x] Vital Signs stable    [x] Labs stable    [x] + Voiding    [x] Wound intact, drainage minimal    [x] Tolerating PO intake     [x] SNF Recommended   [x] Adequate pain control on oral medication alone        Discharge Plan: Skilled Nursing Facility        EZEQUIEL Medel - CNP

## 2024-05-12 PROCEDURE — 2580000003 HC RX 258: Performed by: ORTHOPAEDIC SURGERY

## 2024-05-12 PROCEDURE — 1100000000 HC RM PRIVATE

## 2024-05-12 PROCEDURE — 6370000000 HC RX 637 (ALT 250 FOR IP): Performed by: PHYSICIAN ASSISTANT

## 2024-05-12 PROCEDURE — 6360000002 HC RX W HCPCS: Performed by: ORTHOPAEDIC SURGERY

## 2024-05-12 PROCEDURE — 6370000000 HC RX 637 (ALT 250 FOR IP)

## 2024-05-12 PROCEDURE — 6370000000 HC RX 637 (ALT 250 FOR IP): Performed by: ORTHOPAEDIC SURGERY

## 2024-05-12 RX ADMIN — SENNOSIDES AND DOCUSATE SODIUM 1 TABLET: 50; 8.6 TABLET ORAL at 21:36

## 2024-05-12 RX ADMIN — GABAPENTIN 300 MG: 300 CAPSULE ORAL at 13:09

## 2024-05-12 RX ADMIN — ATORVASTATIN CALCIUM 10 MG: 10 TABLET, FILM COATED ORAL at 21:36

## 2024-05-12 RX ADMIN — HYDROCODONE BITARTRATE AND ACETAMINOPHEN 1 TABLET: 5; 325 TABLET ORAL at 23:39

## 2024-05-12 RX ADMIN — SERTRALINE HYDROCHLORIDE 100 MG: 50 TABLET ORAL at 09:55

## 2024-05-12 RX ADMIN — GABAPENTIN 300 MG: 300 CAPSULE ORAL at 21:36

## 2024-05-12 RX ADMIN — GABAPENTIN 300 MG: 300 CAPSULE ORAL at 09:55

## 2024-05-12 RX ADMIN — POLYETHYLENE GLYCOL 3350 17 G: 17 POWDER, FOR SOLUTION ORAL at 09:55

## 2024-05-12 RX ADMIN — KETOROLAC TROMETHAMINE 15 MG: 30 INJECTION, SOLUTION INTRAMUSCULAR at 09:55

## 2024-05-12 RX ADMIN — KETOROLAC TROMETHAMINE 15 MG: 30 INJECTION, SOLUTION INTRAMUSCULAR at 16:59

## 2024-05-12 RX ADMIN — SENNOSIDES AND DOCUSATE SODIUM 1 TABLET: 50; 8.6 TABLET ORAL at 09:55

## 2024-05-12 RX ADMIN — ACETAMINOPHEN 1000 MG: 500 TABLET ORAL at 13:09

## 2024-05-12 RX ADMIN — FAMOTIDINE 20 MG: 20 TABLET, FILM COATED ORAL at 21:36

## 2024-05-12 RX ADMIN — LISINOPRIL 20 MG: 20 TABLET ORAL at 09:56

## 2024-05-12 RX ADMIN — HYDROCODONE BITARTRATE AND ACETAMINOPHEN 1 TABLET: 5; 325 TABLET ORAL at 16:59

## 2024-05-12 RX ADMIN — HYDROCODONE BITARTRATE AND ACETAMINOPHEN 1 TABLET: 5; 325 TABLET ORAL at 09:55

## 2024-05-12 RX ADMIN — SODIUM CHLORIDE, PRESERVATIVE FREE 10 ML: 5 INJECTION INTRAVENOUS at 21:36

## 2024-05-12 RX ADMIN — FAMOTIDINE 20 MG: 20 TABLET, FILM COATED ORAL at 09:55

## 2024-05-12 RX ADMIN — KETOROLAC TROMETHAMINE 15 MG: 30 INJECTION, SOLUTION INTRAMUSCULAR at 23:39

## 2024-05-12 RX ADMIN — ACETAMINOPHEN 1000 MG: 500 TABLET ORAL at 07:49

## 2024-05-12 RX ADMIN — SODIUM CHLORIDE, PRESERVATIVE FREE 10 ML: 5 INJECTION INTRAVENOUS at 09:56

## 2024-05-12 ASSESSMENT — PAIN DESCRIPTION - DESCRIPTORS
DESCRIPTORS: ACHING;BURNING;DISCOMFORT
DESCRIPTORS: ACHING
DESCRIPTORS: ACHING;DISCOMFORT
DESCRIPTORS: ACHING;DISCOMFORT
DESCRIPTORS: DISCOMFORT
DESCRIPTORS: ACHING;DULL

## 2024-05-12 ASSESSMENT — PAIN SCALES - GENERAL
PAINLEVEL_OUTOF10: 4
PAINLEVEL_OUTOF10: 6
PAINLEVEL_OUTOF10: 7
PAINLEVEL_OUTOF10: 6
PAINLEVEL_OUTOF10: 6
PAINLEVEL_OUTOF10: 4

## 2024-05-12 ASSESSMENT — PAIN DESCRIPTION - PAIN TYPE
TYPE: SURGICAL PAIN

## 2024-05-12 ASSESSMENT — PAIN DESCRIPTION - ONSET
ONSET: ON-GOING
ONSET: GRADUAL

## 2024-05-12 ASSESSMENT — PAIN DESCRIPTION - ORIENTATION
ORIENTATION: LEFT

## 2024-05-12 ASSESSMENT — PAIN DESCRIPTION - LOCATION
LOCATION: ARM

## 2024-05-12 ASSESSMENT — PAIN DESCRIPTION - FREQUENCY
FREQUENCY: INTERMITTENT

## 2024-05-12 ASSESSMENT — PAIN - FUNCTIONAL ASSESSMENT
PAIN_FUNCTIONAL_ASSESSMENT: PREVENTS OR INTERFERES SOME ACTIVE ACTIVITIES AND ADLS
PAIN_FUNCTIONAL_ASSESSMENT: ACTIVITIES ARE NOT PREVENTED
PAIN_FUNCTIONAL_ASSESSMENT: PREVENTS OR INTERFERES SOME ACTIVE ACTIVITIES AND ADLS

## 2024-05-12 NOTE — PLAN OF CARE
Problem: Chronic Conditions and Co-morbidities  Goal: Patient's chronic conditions and co-morbidity symptoms are monitored and maintained or improved  5/12/2024 1048 by Madeline Meadows RN  Outcome: Progressing  5/11/2024 2344 by Roxanne Arambula LPN  Outcome: Progressing     Problem: Pain  Goal: Verbalizes/displays adequate comfort level or baseline comfort level  5/12/2024 1048 by Madeline Meadows RN  Outcome: Progressing  5/11/2024 2344 by Roxanne Arambula LPN  Outcome: Progressing     Problem: Safety - Adult  Goal: Free from fall injury  5/12/2024 1048 by Madeline Meadows RN  Outcome: Progressing  5/11/2024 2344 by Roxanne Arambula LPN  Outcome: Progressing     Problem: ABCDS Injury Assessment  Goal: Absence of physical injury  5/12/2024 1048 by Madeline Meadows RN  Outcome: Progressing  5/11/2024 2344 by Roxanne Arambula LPN  Outcome: Progressing     Problem: Skin/Tissue Integrity  Goal: Absence of new skin breakdown  Description: 1.  Monitor for areas of redness and/or skin breakdown  2.  Assess vascular access sites hourly  3.  Every 4-6 hours minimum:  Change oxygen saturation probe site  4.  Every 4-6 hours:  If on nasal continuous positive airway pressure, respiratory therapy assess nares and determine need for appliance change or resting period.  5/12/2024 1048 by Madeline Meadows RN  Outcome: Progressing  5/11/2024 2344 by Roxanne Arambula LPN  Outcome: Progressing     Problem: Nutrition Deficit:  Goal: Optimize nutritional status  5/12/2024 1048 by Madeline Meadows RN  Outcome: Progressing  5/11/2024 2344 by Roxanne Arambula LPN  Outcome: Progressing

## 2024-05-12 NOTE — PROGRESS NOTES
End of Shift Note    Bedside shift change report given to herb CASTANEDA (oncoming nurse) by Roxanne Arambula LPN (offgoing nurse).  Report included the following information SBAR, Kardex, Procedure Summary, Intake/Output, and MAR    Shift worked:  night     Shift summary and any significant changes:    Pt hypertensive   Pain managed w/ NORCO and 0.5 Toradol  Up to bathroom x1 assist, unsteady gait       Concerns for physician to address:  none     Zone phone for oncoming shift:  [phone]       Activity:     Number times ambulated in hallways past shift: 0  Number of times OOB to chair past shift: 3 (Bathroom)    Cardiac:   Cardiac Monitoring: No           Access:  Current line(s): PIV     Genitourinary:   Urinary status: voiding    Respiratory:      Chronic home O2 use?: NO  Incentive spirometer at bedside: YES       GI:     Current diet:  ADULT ORAL NUTRITION SUPPLEMENT; Breakfast; Standard High Calorie/High Protein Oral Supplement  ADULT ORAL NUTRITION SUPPLEMENT; Lunch, Dinner; Frozen Oral Supplement  ADULT DIET; Regular  Passing flatus: YES  Tolerating current diet: YES       Pain Management:   Patient states pain is manageable on current regimen: YES    Skin:     Interventions: increase time out of bed, PT/OT consult, limit briefs, and internal/external urinary devices    Patient Safety:  Fall Score:    Interventions: bed/chair alarm, assistive device (walker, cane. etc), gripper socks, pt to call before getting OOB, and stay with me (per policy)       Length of Stay:  Expected LOS: 4  Actual LOS: 4      Roxanne Arambula LPN

## 2024-05-12 NOTE — PLAN OF CARE
Problem: Chronic Conditions and Co-morbidities  Goal: Patient's chronic conditions and co-morbidity symptoms are monitored and maintained or improved  Outcome: Progressing     Problem: Pain  Goal: Verbalizes/displays adequate comfort level or baseline comfort level  Outcome: Progressing     Problem: Safety - Adult  Goal: Free from fall injury  Outcome: Progressing     Problem: ABCDS Injury Assessment  Goal: Absence of physical injury  Outcome: Progressing     Problem: Skin/Tissue Integrity  Goal: Absence of new skin breakdown  Description: 1.  Monitor for areas of redness and/or skin breakdown  2.  Assess vascular access sites hourly  3.  Every 4-6 hours minimum:  Change oxygen saturation probe site  4.  Every 4-6 hours:  If on nasal continuous positive airway pressure, respiratory therapy assess nares and determine need for appliance change or resting period.  Outcome: Progressing     Problem: Nutrition Deficit:  Goal: Optimize nutritional status  Outcome: Progressing

## 2024-05-12 NOTE — PROGRESS NOTES
Orthopedic Surgery NP Note    POD# 4  s/p LEFT OLECRANON OPEN  REDUCTION INTERNAL FIXATION   Pt seen with no visitor present  Patient in bed watching a movie on her laptop  No complaint of pain  Tolerating PO  Inquired about placement status. Eager to leave  VSS Afebrile.    Visit Vitals  BP (!) 157/85   Pulse 77   Temp 98.1 °F (36.7 °C)   Resp 18   Ht 1.6 m (5' 3\")   Wt 44.7 kg (98 lb 8.7 oz)   SpO2 95%   BMI 17.46 kg/m²     Body mass index is 17.46 kg/m². : A BMI > 30 is classified as obesity and > 40 is classified as morbid obesity.     Awake and alert. No acute distress.    Dressing: L arm splint, clean, dry, and intact  No significant erythema or swelling. Ecchymosis L thumb improving  Neurological/Neurovascular exam within normal limits.                Senstion intact: LE bilat.               Motor: + finger flex/ext     PLAN:  1) PT/OT. NWB LUE. Sling for comfort PRN  2) SCD for mechanical DVT Prophylaxis   3) GI Prophylaxis - pepcid  4) Pain control - scheduled tylenol, and prn oxy    5) Readiness for discharge:              [x] Vital Signs stable               [x] Labs stable               [x] + Voiding               [x] Wound intact, drainage minimal               [x] Tolerating PO intake                [x] SNF Recommended              [x] Adequate pain control on oral medication alone          Discharge Plan: Skilled Nursing Facility       EZEQUIEL Medel - CNP

## 2024-05-12 NOTE — PROGRESS NOTES
End of Shift Note    Bedside shift change report given to Carmen CASTANEDA (oncoming nurse) by Roxanne Arambula LPN (offgoing nurse).  Report included the following information SBAR, Kardex, Procedure Summary, Intake/Output, and MAR    Shift worked:  night     Shift summary and any significant changes:    Pt hypertensive, given 10mg Hydralazine ONCE dose ,  BP dropped overnight,  once as need bolum 500ml , bp resolved last bp 133/61   Pt on continuous fluid overnight   Pain managed w/ NORCO and 0.5 Toradol  Up to bathroom x1 assist, unsteady gait       Concerns for physician to address:  none     Zone phone for oncoming shift:  [phone]       Activity:     Number times ambulated in hallways past shift: 0  Number of times OOB to chair past shift: 1 (bathroom)    Cardiac:   Cardiac Monitoring: No           Access:  Current line(s): PIV     Genitourinary:   Urinary status: voiding    Respiratory:      Chronic home O2 use?: NO  Incentive spirometer at bedside: YES       GI:     Current diet:  ADULT ORAL NUTRITION SUPPLEMENT; Breakfast; Standard High Calorie/High Protein Oral Supplement  ADULT ORAL NUTRITION SUPPLEMENT; Lunch, Dinner; Frozen Oral Supplement  ADULT DIET; Regular  Passing flatus: YES  Tolerating current diet: YES       Pain Management:   Patient states pain is manageable on current regimen: YES    Skin:     Interventions: increase time out of bed, PT/OT consult, limit briefs, and internal/external urinary devices    Patient Safety:  Fall Score:    Interventions: bed/chair alarm, assistive device (walker, cane. etc), gripper socks, pt to call before getting OOB, and stay with me (per policy)       Length of Stay:  Expected LOS: 4  Actual LOS: 3      Roxanne Arambula LPN

## 2024-05-13 VITALS
BODY MASS INDEX: 17.46 KG/M2 | SYSTOLIC BLOOD PRESSURE: 142 MMHG | HEIGHT: 63 IN | OXYGEN SATURATION: 97 % | HEART RATE: 83 BPM | WEIGHT: 98.55 LBS | DIASTOLIC BLOOD PRESSURE: 70 MMHG | TEMPERATURE: 98.2 F | RESPIRATION RATE: 16 BRPM

## 2024-05-13 PROCEDURE — 6360000002 HC RX W HCPCS: Performed by: ORTHOPAEDIC SURGERY

## 2024-05-13 PROCEDURE — APPNB180 APP NON BILLABLE TIME > 60 MINS

## 2024-05-13 PROCEDURE — 6370000000 HC RX 637 (ALT 250 FOR IP): Performed by: ORTHOPAEDIC SURGERY

## 2024-05-13 PROCEDURE — 2580000003 HC RX 258: Performed by: ORTHOPAEDIC SURGERY

## 2024-05-13 PROCEDURE — 0PSL04Z REPOSITION LEFT ULNA WITH INTERNAL FIXATION DEVICE, OPEN APPROACH: ICD-10-PCS

## 2024-05-13 RX ORDER — HYDRALAZINE HYDROCHLORIDE 20 MG/ML
10 INJECTION INTRAMUSCULAR; INTRAVENOUS ONCE
Status: DISCONTINUED | OUTPATIENT
Start: 2024-05-13 | End: 2024-05-13

## 2024-05-13 RX ORDER — HYDROCODONE BITARTRATE AND ACETAMINOPHEN 5; 325 MG/1; MG/1
1 TABLET ORAL EVERY 6 HOURS PRN
Qty: 20 TABLET | Refills: 0 | Status: SHIPPED | OUTPATIENT
Start: 2024-05-13 | End: 2024-05-20

## 2024-05-13 RX ORDER — HYDRALAZINE HYDROCHLORIDE 20 MG/ML
10 INJECTION INTRAMUSCULAR; INTRAVENOUS ONCE
Status: COMPLETED | OUTPATIENT
Start: 2024-05-13 | End: 2024-05-13

## 2024-05-13 RX ADMIN — SODIUM CHLORIDE 500 ML: 9 INJECTION, SOLUTION INTRAVENOUS at 02:50

## 2024-05-13 RX ADMIN — POLYETHYLENE GLYCOL 3350 17 G: 17 POWDER, FOR SOLUTION ORAL at 09:56

## 2024-05-13 RX ADMIN — SODIUM CHLORIDE: 9 INJECTION, SOLUTION INTRAVENOUS at 08:21

## 2024-05-13 RX ADMIN — SODIUM CHLORIDE, PRESERVATIVE FREE 10 ML: 5 INJECTION INTRAVENOUS at 09:58

## 2024-05-13 RX ADMIN — FAMOTIDINE 20 MG: 20 TABLET, FILM COATED ORAL at 09:56

## 2024-05-13 RX ADMIN — SERTRALINE HYDROCHLORIDE 100 MG: 50 TABLET ORAL at 09:56

## 2024-05-13 RX ADMIN — GABAPENTIN 300 MG: 300 CAPSULE ORAL at 09:56

## 2024-05-13 RX ADMIN — SODIUM CHLORIDE: 9 INJECTION, SOLUTION INTRAVENOUS at 03:44

## 2024-05-13 RX ADMIN — HYDRALAZINE HYDROCHLORIDE 10 MG: 20 INJECTION, SOLUTION INTRAMUSCULAR; INTRAVENOUS at 00:35

## 2024-05-13 RX ADMIN — LISINOPRIL 20 MG: 20 TABLET ORAL at 09:56

## 2024-05-13 RX ADMIN — ACETAMINOPHEN 1000 MG: 500 TABLET ORAL at 06:39

## 2024-05-13 RX ADMIN — SENNOSIDES AND DOCUSATE SODIUM 1 TABLET: 50; 8.6 TABLET ORAL at 09:56

## 2024-05-13 ASSESSMENT — PAIN - FUNCTIONAL ASSESSMENT: PAIN_FUNCTIONAL_ASSESSMENT: ACTIVITIES ARE NOT PREVENTED

## 2024-05-13 ASSESSMENT — PAIN DESCRIPTION - PAIN TYPE: TYPE: SURGICAL PAIN

## 2024-05-13 ASSESSMENT — PAIN DESCRIPTION - DESCRIPTORS: DESCRIPTORS: ACHING;DULL

## 2024-05-13 ASSESSMENT — PAIN DESCRIPTION - FREQUENCY: FREQUENCY: INTERMITTENT

## 2024-05-13 ASSESSMENT — PAIN DESCRIPTION - ORIENTATION: ORIENTATION: LEFT

## 2024-05-13 ASSESSMENT — PAIN DESCRIPTION - ONSET: ONSET: GRADUAL

## 2024-05-13 ASSESSMENT — PAIN SCALES - GENERAL: PAINLEVEL_OUTOF10: 3

## 2024-05-13 ASSESSMENT — PAIN DESCRIPTION - LOCATION: LOCATION: ARM

## 2024-05-13 NOTE — PROGRESS NOTES
Ortho / Neurosurgery NP Note    POD# 5  s/p LEFT OLECRANON OPEN  REDUCTION INTERNAL FIXATION   Pt seen with visitor present    Patient in bed. Awake and alert.   Reports pain improved with current regimen  Switched to norco over the weekend.   Hypotension overnight after PRN hydralazine . Stable this AM  Lives alone. Left hand dominant.   SNF pending.     VSS Afebrile.    Visit Vitals  BP (!) 150/86   Pulse 80   Temp 97.3 °F (36.3 °C) (Oral)   Resp 18   Ht 1.6 m (5' 3\")   Wt 44.7 kg (98 lb 8.7 oz)   SpO2 99%   BMI 17.46 kg/m²       Voiding status: Voiding            Labs    Lab Results   Component Value Date/Time    HGB 11.4 07/24/2020 05:01 AM      Lab Results   Component Value Date/Time    INR 1.0 07/21/2020 04:35 AM      Lab Results   Component Value Date/Time     07/20/2020 04:18 PM    K 4.0 07/20/2020 04:18 PM     07/20/2020 04:18 PM    CO2 27 07/20/2020 04:18 PM    BUN 13 07/20/2020 04:18 PM     Recent Glucose Results:   Glucose   Date Value Ref Range Status   07/20/2020 99 65 - 100 mg/dL Final   05/04/2020 114 (H) 65 - 100 mg/dL Final   06/19/2019 89 65 - 100 mg/dL Final           Body mass index is 17.46 kg/m². : A BMI > 30 is classified as obesity and > 40 is classified as morbid obesity.     Awake and alert. No acute distress.    Dressing: LUE splint  C.D.I.   No significant erythema or swelling  Cryotherapy in place over incision.   LUE sensation intact. Can wiggle fingers. Brisk cap refill.     SCD for mechanical DVT proph while in bed        PLAN:    Activity: PT/OT-NWB LUE in sling  Pain Control: Scheduled Tylenol,  PRN [place]. Elevate extremity.     Medically stable to discharge.       Discharge Plan: SNF once bed available.               EZEQUIEL Perez - NP

## 2024-05-13 NOTE — CARE COORDINATION
[facility] room 403B  Call report to [phone]  Son to transport    Transition of Care Plan:    RUR: 7% low risk  Prior Level of Functioning: independent  Disposition: SNF - [facility]  If SNF or IPR: Date FOC offered: 5/9/24  Date FOC received: 5/9/24  Accepting facility: [facility]  Date authorization started with reference number: n/a  Date authorization received and expires:   Follow up appointments: defer to facility  DME needed: defer to facility  Transportation at discharge: family  IM/IMM Medicare/ letter given: will need 2nd IM letter  Is patient a [facility] and connected with [facility]? N/a   If yes, was [facility] transfer form completed and [facility] notified?   Caregiver Contact: son Hari  Discharge Caregiver contacted prior to discharge? Yes, at bedside  Care Conference needed? no  Barriers to discharge: none    Initial note - Spoke with admissions rep at [facility] to confirm bed availability for today, was told 403[address] at any time. Met with pt at bedside before rounds to inform of bed availability today. Discussed pt in rounds, recommendation for wheelchair or private car as transport. Met with pt and son at bedside to discuss this recommendation, pt's son to transport once discharge is complete.     Cassi Gonzales, [facility]  [phone]

## 2024-05-13 NOTE — DISCHARGE SUMMARY
Ortho Discharge Summary    Patient ID:  Afia Dumont  [id number]  female  74 y.o.  1950    Admit date: 5/8/2024    Discharge date: 5/13/2024    Admitting Physician: Zach Corcoran MD     Consulting Physician(s):   Treatment Team: Attending Provider: Zach Corcoran MD; Surgeon: Amando Alberto MD; Surgeon: Zach Corcoran MD; Utilization Reviewer: Alexa Laughlin RN; : Cassi Gonzales; Physical Therapist: Maria De Jesus Rolon PT; Occupational Therapist: Kerley, Matthew, OT    Date of Surgery:   5/8/2024     Preoperative Diagnosis:  Closed displaced intra-articular fracture of olecranon process of left ulna, initial encounter [S52.032A]    Postoperative Diagnosis:   * No post-op diagnosis entered *    Procedure(s):   LEFT OLECRANON OPEN  REDUCTION INTERNAL FIXATION     Anesthesia Type:   Regional     Surgeon: Zach Corcoran MD                            HPI:  Pt is a 74 y.o. female who has a history of Closed displaced intra-articular fracture of olecranon process of left ulna, initial encounter [S52.032A]  with pain and limitations of activities of daily living who presents at this time for a  LEFT OLECRANON OPEN  REDUCTION INTERNAL FIXATION following the failure of conservative management.    PMH:   Past Medical History:   Diagnosis Date    Cerebral artery occlusion with cerebral infarction (HCC)     left side weakness and painful    Chronic pain     left side    Essential hypertension     Rheumatoid arthritis (HCC)        Body mass index is 17.46 kg/m². : A BMI > 30 is classified as obesity and > 40 is classified as morbid obesity.     Medications upon admission :   Prior to Admission Medications   Prescriptions Last Dose Informant Patient Reported? Taking?   folic acid (FOLVITE) 1 MG tablet 5/8/2024 at 0600  Yes No   Sig: Take 1 tablet by mouth daily   gabapentin (NEURONTIN) 300 MG capsule 5/8/2024 at 0600  Yes No   Sig: Take by mouth 3 times daily.   inFLIXimab (REMICADE) 100 MG injection  -DEBORAHB Maddy MUSTAFA   - Wound Care Keep wound clean and dry.  See discharge instruction sheet.            -DISCHARGE MEDICATION LIST        Medication List        START taking these medications      acetaminophen 500 MG tablet  Commonly known as: TYLENOL  Take 2 tablets by mouth every 8 hours     HYDROcodone-acetaminophen 5-325 MG per tablet  Commonly known as: NORCO  Take 1 tablet by mouth every 6 hours as needed for Pain for up to 7 days. Max Daily Amount: 4 tablets     polyethylene glycol 17 g packet  Commonly known as: GLYCOLAX  Take 1 packet by mouth daily for 14 doses            CHANGE how you take these medications      gabapentin 300 MG capsule  Commonly known as: NEURONTIN  Take 1 capsule by mouth 3 times daily for 14 days. Max Daily Amount: 900 mg  What changed: how much to take            CONTINUE taking these medications      folic acid 1 MG tablet  Commonly known as: FOLVITE     inFLIXimab 100 MG injection  Commonly known as: REMICADE     lisinopril 20 MG tablet  Commonly known as: PRINIVIL;ZESTRIL     methotrexate 2.5 MG chemo tablet  Commonly known as: RHEUMATREX     sertraline 50 MG tablet  Commonly known as: ZOLOFT     simvastatin 10 MG tablet  Commonly known as: ZOCOR     traZODone 50 MG tablet  Commonly known as: DESYREL            ASK your doctor about these medications      naloxone 4 MG/0.1ML Liqd nasal spray               Where to Get Your Medications        Information about where to get these medications is not yet available    Ask your nurse or doctor about these medications  acetaminophen 500 MG tablet  gabapentin 300 MG capsule  HYDROcodone-acetaminophen 5-325 MG per tablet  polyethylene glycol 17 g packet      per medical continuation form      -Follow up in office in 2 weeks      Signed:  EZEQUIEL Perez - NP    Orthopaedic Nurse Practitioner    5/13/2024  9:49 AM

## 2024-05-13 NOTE — PROGRESS NOTES
Report called to [facility] .  Report given to Deidra Diez LPN.  Discharge instructions reviewed with the patient .  The patient able to verbalize an understanding.

## 2024-05-13 NOTE — PLAN OF CARE
Problem: Chronic Conditions and Co-morbidities  Goal: Patient's chronic conditions and co-morbidity symptoms are monitored and maintained or improved  5/12/2024 2356 by Roxanne Arambula LPN  Outcome: Progressing  5/12/2024 1048 by Madeline Meadows RN  Outcome: Progressing     Problem: Pain  Goal: Verbalizes/displays adequate comfort level or baseline comfort level  5/12/2024 2356 by Roxanne Arambula LPN  Outcome: Progressing  5/12/2024 1048 by Madeline Meadows RN  Outcome: Progressing     Problem: Safety - Adult  Goal: Free from fall injury  5/12/2024 2356 by Roxanne Arambula LPN  Outcome: Progressing  5/12/2024 1048 by Madeline Meadows RN  Outcome: Progressing     Problem: ABCDS Injury Assessment  Goal: Absence of physical injury  5/12/2024 2356 by Roxanne Arambula LPN  Outcome: Progressing  5/12/2024 1048 by Madeline Meadows RN  Outcome: Progressing     Problem: Skin/Tissue Integrity  Goal: Absence of new skin breakdown  Description: 1.  Monitor for areas of redness and/or skin breakdown  2.  Assess vascular access sites hourly  3.  Every 4-6 hours minimum:  Change oxygen saturation probe site  4.  Every 4-6 hours:  If on nasal continuous positive airway pressure, respiratory therapy assess nares and determine need for appliance change or resting period.  5/12/2024 2356 by Roxanne Arambula LPN  Outcome: Progressing  5/12/2024 1048 by Madeline Meadows RN  Outcome: Progressing     Problem: Nutrition Deficit:  Goal: Optimize nutritional status  5/12/2024 2356 by Roxanne Arambula LPN  Outcome: Progressing  5/12/2024 1048 by Madeline Meadows RN  Outcome: Progressing

## 2024-05-14 NOTE — PROGRESS NOTES
Physician Progress Note      PATIENT:               YEIMY BOYLE  [facility] #:                  [id number]  :                       1950  ADMIT DATE:       2024 12:08 PM  DISCH DATE:        2024 12:15 PM  RESPONDING  PROVIDER #:        TRAY HANSON          QUERY TEXT:    Tray  Patient admitted with fracture. Noted to have a dietician assessment with   moderate malnutrition on . If possible, please document in progress notes   and discharge summary if you are evaluating and /or treating any of the   following:    The medical record reflects the following:  Risk Factors:  HTN, arthritis, hyperlipidemia  Clinical Indicators:  registered dietitian assessment: Malnutrition Status:    Moderate malnutrition (24 1324)  Context:  Chronic Illness   Findings of the 6 clinical characteristics of   malnutrition: Energy Intake:  Mild decrease in energy intake (Comment) Weight   Loss:  Greater than 10% over 6 months   Body Fat Loss:  Mild body fat loss   Orbital  Muscle Mass Loss:  Unable to assess  Fluid Accumulation:  No significant fluid   accumulation   Strength:  Not Performed  Treatment: Continue Regular diet, Add ensure plus high protein daily and magic   cups BID      ASPEN Criteria:    https://aspenjournals.onlinelibrary.hayes.com/doi/full/10.1177/375016165042195  5  Options provided:  -- Protein calorie malnutrition moderate  -- Other - I will add my own diagnosis  -- Disagree - Not applicable / Not valid  -- Disagree - Clinically unable to determine / Unknown  -- Refer to Clinical Documentation Reviewer    PROVIDER RESPONSE TEXT:    This patient has moderate protein calorie malnutrition.    Query created by: Gisell Arango on 2024 12:53 PM      Electronically signed by:  TRAY HANSON 2024 1:56 PM

## (undated) DEVICE — PADDING CST 4IN STERILE --

## (undated) DEVICE — Device

## (undated) DEVICE — DRIVER SURG UNIV QUIK CONN T10 FOR VOLAR DST RAD PLATING

## (undated) DEVICE — UNTHREADED GUIDE WIRE: Brand: FIXOS

## (undated) DEVICE — BIPOLAR FORCEPS CORD: Brand: VALLEYLAB

## (undated) DEVICE — SUTURE MCRYL SZ 3-0 L27IN ABSRB UD L19MM PS-2 3/8 CIR PRIM Y427H

## (undated) DEVICE — EXTREMITY-MRMC: Brand: MEDLINE INDUSTRIES, INC.

## (undated) DEVICE — C-ARM: Brand: UNBRANDED

## (undated) DEVICE — GOWN,SIRUS,NONRNF,SETINSLV,2XL,18/CS: Brand: MEDLINE

## (undated) DEVICE — Z DISCONTINUED PER MEDLINE (LOW STOCK)  USE 2422770 DRAPE C ARM W54XL78IN FOR FLROSCN

## (undated) DEVICE — SPONGE GZ W4XL4IN COT RADPQ HIGHLY ABSRB

## (undated) DEVICE — APPLICATOR MEDICATED 26 CC SOLUTION HI LT ORNG CHLORAPREP

## (undated) DEVICE — ZIMMER® STERILE DISPOSABLE TOURNIQUET CUFF WITH PLC, DUAL PORT, SINGLE BLADDER, 18 IN. (46 CM)

## (undated) DEVICE — (D)PREP SKN CHLRAPRP APPL 26ML -- CONVERT TO ITEM 371833

## (undated) DEVICE — BIT DRL L40MM DIA2MM CANN POLYAX LOK SCR FOR DST VOLAR RAD

## (undated) DEVICE — COVER LT HNDL PLAS RIG 1 PER PK

## (undated) DEVICE — BANDAGE,ELASTIC,ESMARK,STERILE,4"X9',LF: Brand: MEDLINE

## (undated) DEVICE — TOWEL SURG W17XL27IN STD BLU COT NONFENESTRATED PREWASHED

## (undated) DEVICE — INFECTION CONTROL KIT SYS

## (undated) DEVICE — HAND I-LF: Brand: MEDLINE INDUSTRIES, INC.

## (undated) DEVICE — SUT ETHLN 3-0 18IN PS2 BLK --

## (undated) DEVICE — DRESSING,GAUZE,XEROFORM,CURAD,1"X8",ST: Brand: CURAD

## (undated) DEVICE — PADDING CAST W4INXL4YD ST COT RAYON MICROPLEATED HIGHLY

## (undated) DEVICE — GARMENT,MEDLINE,DVT,INT,CALF,MED, GEN2: Brand: MEDLINE

## (undated) DEVICE — SUTURE MONOCRYL SZ 2-0 L36IN ABSRB UD L36MM CT-1 1/2 CIR Y945H

## (undated) DEVICE — BANDAGE COMPR W4INXL5YD WHT BGE POLY COT M E WRP WV HK AND

## (undated) DEVICE — GLOVE ORTHO 8   MSG9480

## (undated) DEVICE — SUTURE ETHILON SZ 3-0 L18IN NONABSORBABLE BLK PS-2 L19MM 3/8 1669H

## (undated) DEVICE — STRAP,POSITIONING,KNEE/BODY,FOAM,4X60": Brand: MEDLINE

## (undated) DEVICE — 4-PORT MANIFOLD: Brand: NEPTUNE 2

## (undated) DEVICE — BNDG ELAS HK LOOP 4X5YD NS -- MATRIX

## (undated) DEVICE — PAD,ABDOMINAL,8"X10",ST,LF: Brand: MEDLINE

## (undated) DEVICE — SOLUTION IV 1000ML 0.9% SOD CHL

## (undated) DEVICE — GUIDE AIM 1.5MM --

## (undated) DEVICE — PENCIL SMK EVAC ALL IN 1 DSGN ENH VISIBILITY IMPROVED AIR

## (undated) DEVICE — PREP SKN CHLRAPRP APL 26ML STR --

## (undated) DEVICE — STERILE POLYISOPRENE POWDER-FREE SURGICAL GLOVES WITH EMOLLIENT COATING: Brand: PROTEXIS

## (undated) DEVICE — SOLUTION IRRIG 500ML 0.9% SOD CHLO USP POUR PLAS BTL

## (undated) DEVICE — CORD BPLR L12FT DISP

## (undated) DEVICE — MARKER SURG SKIN UTIL REGULAR/FINE 2 TIP W/ RUL AND 9 LBL

## (undated) DEVICE — ZIMMER® STERILE DISPOSABLE TOURNIQUET CUFF WITH PROTECTIVE SLEEVE AND PLC, DUAL PORT, SINGLE BLADDER, 18 IN. (46 CM)

## (undated) DEVICE — Z DISCONTINUED NO SUB IDED BUCKET CAST INF CLAV STRP WHT BCKL CLSR PREM DISPOSABLE

## (undated) DEVICE — TRAP FLUID BUFFALO FLTR

## (undated) DEVICE — EXTREMITY III-LF: Brand: MEDLINE INDUSTRIES, INC.

## (undated) DEVICE — STERILE POLYISOPRENE POWDER-FREE SURGICAL GLOVES: Brand: PROTEXIS

## (undated) DEVICE — TUBING SUCT 12FR MAL ALUM SHFT FN CAP VENT UNIV CONN W/ OBT

## (undated) DEVICE — SUTURE ETHLN SZ 4-0 L18IN NONABSORBABLE BLK L19MM PS-2 3/8 1667H

## (undated) DEVICE — GLOVE SURG SZ 85 L12IN FNGR THK79MIL GRN LTX FREE

## (undated) DEVICE — DRESSING STERILE PETRO W3XL8IN N ADH OIL EMUL GZ CURAD

## (undated) DEVICE — DRILL BIT, AO DIA2.6MM X 135MM, SCALED: Brand: VARIAX

## (undated) DEVICE — DRILL BIT, AO DIA2.0MM X 135MM, SCALED: Brand: VARIAX

## (undated) DEVICE — C-ARMOR C-ARM EQUIPMENT COVERS CLEAR STERILE UNIVERSAL FIT 12 PER CASE: Brand: C-ARMOR

## (undated) DEVICE — BANDAGE COBAN 4 IN COMPR W4INXL5YD FOAM COHESIVE QUIK STK SELF ADH SFT

## (undated) DEVICE — BIT DRL SLD SD CUT 2.5X40MM -- DISP

## (undated) DEVICE — SOLUTION IRRIG 1000ML STRL H2O USP PLAS POUR BTL

## (undated) DEVICE — TUBING, SUCTION, 1/4" X 10', STRAIGHT: Brand: MEDLINE

## (undated) DEVICE — BOWL UTIL GRAD 32OZ STRL --

## (undated) DEVICE — SPONGE GZ W4XL4IN COT 12 PLY TYP VII WVN C FLD DSGN STERILE

## (undated) DEVICE — BIT DRL SLD SD CUT 2X40MM DISP --